# Patient Record
Sex: FEMALE | Race: WHITE | NOT HISPANIC OR LATINO | Employment: FULL TIME | URBAN - NONMETROPOLITAN AREA
[De-identification: names, ages, dates, MRNs, and addresses within clinical notes are randomized per-mention and may not be internally consistent; named-entity substitution may affect disease eponyms.]

---

## 2017-01-17 ENCOUNTER — OFFICE VISIT (OUTPATIENT)
Dept: FAMILY MEDICINE CLINIC | Facility: CLINIC | Age: 42
End: 2017-01-17

## 2017-01-17 VITALS
OXYGEN SATURATION: 99 % | WEIGHT: 223 LBS | SYSTOLIC BLOOD PRESSURE: 148 MMHG | HEIGHT: 64 IN | TEMPERATURE: 98.5 F | BODY MASS INDEX: 38.07 KG/M2 | HEART RATE: 85 BPM | DIASTOLIC BLOOD PRESSURE: 80 MMHG

## 2017-01-17 DIAGNOSIS — M79.641 PAIN IN BOTH HANDS: Primary | ICD-10-CM

## 2017-01-17 DIAGNOSIS — M79.642 PAIN IN BOTH HANDS: Primary | ICD-10-CM

## 2017-01-17 PROBLEM — R20.2 PARESTHESIA: Status: ACTIVE | Noted: 2017-01-17

## 2017-01-17 PROCEDURE — 99213 OFFICE O/P EST LOW 20 MIN: CPT | Performed by: FAMILY MEDICINE

## 2017-01-17 NOTE — MR AVS SNAPSHOT
"Suzi Heck   1/17/2017 4:30 PM   Office Visit    Dept Phone:  [phone]   Encounter #:  [id number]    Provider:  Nicholas Maldonado MD   Department:  [facility]                Your Full Care Plan              Your Updated Medication List          This list is accurate as of: 1/17/17  5:32 PM.  Always use your most recent med list.                colestipol 1 G tablet   Commonly known as:  COLESTID       dicyclomine 10 MG capsule   Commonly known as:  BENTYL       PRILOSEC 40 MG capsule   Generic drug:  omeprazole               We Performed the Following     Ambulatory Referral to Orthopedic Surgery     Folate     TSH     Vitamin B1, Whole Blood     Vitamin B12     Vitamin B6       You Were Diagnosed With        Codes Comments    Pain in both hands    -  Primary ICD-10-CM: M79.641, M79.642  ICD-9-CM: 729.5       Instructions     None    Patient Instructions History      Upcoming Appointments     Visit Type Date Time Department    OFFICE VISIT 1[facility]17/2017  4:30 PM [facility]      [employer] Signup     Our records indicate that you have declined [employer] signup. If you would like to sign up for [employer], please email [email or web address] or call [phone] to obtain an activation code.             Other Info from Your Visit           Allergies     Codeine      UNKNOWN REACTION    Demerol [Meperidine]      Vicodin [Hydrocodone-acetaminophen]        Reason for Visit     Wrist Pain           Vital Signs     Blood Pressure Pulse Temperature Height    148/80 (BP Location: Left arm, Patient Position: Sitting, Cuff Size: Adult) 85 98.5 °F (36.9 °C) (Temporal Artery ) 63.5\" (161.3 cm)    Weight Oxygen Saturation Body Mass Index Smoking Status    223 lb (101 kg) 99% 38.88 kg/m2 Current Every Day Smoker      Problems and Diagnoses Noted     Numbness and tingling    Pain in both hands    -  Primary        "

## 2017-01-18 ENCOUNTER — HOSPITAL ENCOUNTER (OUTPATIENT)
Dept: OTHER | Facility: HOSPITAL | Age: 42
Discharge: HOME | End: 2017-01-18
Attending: FAMILY MEDICINE | Admitting: FAMILY MEDICINE

## 2017-01-18 LAB
FOLATE SERPL-MCNC: 4.04 NG/ML
TSH SERPL-ACNC: 3.81 UIU/ML (ref 0.46–4.68)
VIT B12 SERPL-MCNC: 246 PG/ML (ref 239–931)

## 2017-01-18 NOTE — PROGRESS NOTES
Subjective   Suzi Heck is a 41 y.o. female.     History of Present Illness     [place], wringing a wet towel repetatively, right wrist developed a swollen painful area.  Pain base of both thumbs.  Hands go numb, difficult to get information exactly which fingers but digits 1-3.  Hands wake her up, has to shake them.    She believes she has bilateral carpal tunnel.    Review of Systems   Constitutional: Negative for chills, fatigue and fever.   HENT: Negative for congestion, ear discharge, ear pain, facial swelling, hearing loss, postnasal drip, rhinorrhea, sinus pressure, sore throat, trouble swallowing and voice change.    Eyes: Negative for discharge, redness and visual disturbance.   Respiratory: Negative for cough, chest tightness, shortness of breath and wheezing.    Cardiovascular: Negative for chest pain and palpitations.   Gastrointestinal: Negative for abdominal pain, blood in stool, constipation, diarrhea, nausea and vomiting.   Endocrine: Negative for polydipsia and polyuria.   Genitourinary: Negative for dysuria, flank pain, hematuria and urgency.   Musculoskeletal: Negative for arthralgias, back pain, joint swelling and myalgias.   Skin: Negative for rash.   Neurological: Negative for dizziness, weakness, numbness and headaches.   Hematological: Negative for adenopathy.   Psychiatric/Behavioral: Negative for confusion and sleep disturbance. The patient is not nervous/anxious.        Objective   Physical Exam   Constitutional: She is oriented to person, place, and time. She appears well-developed and well-nourished.   HENT:   Head: Normocephalic and atraumatic.   Right Ear: External ear normal.   Left Ear: External ear normal.   Nose: Nose normal.   Eyes: Conjunctivae and EOM are normal. Pupils are equal, round, and reactive to light.   Neck: Normal range of motion.   Pulmonary/Chest: Effort normal.   Musculoskeletal: Normal range of motion.   Swelling of wrist proximal 1st cmc joint.  Some firm  consistancy but radial pulse palpable through it.  Some mild tenderness base of thumb.  finklestein test negative.      Neurological: She is alert and oriented to person, place, and time.   Psychiatric: She has a normal mood and affect. Her behavior is normal. Judgment and thought content normal.   Nursing note and vitals reviewed.      Assessment/Plan   Suzi was seen today for wrist pain.    Diagnoses and all orders for this visit:    Pain in both hands  -     Ambulatory Referral to Orthopedic Surgery  -     TSH  -     Vitamin B1, Whole Blood  -     Vitamin B12  -     Vitamin B6  -     Folate      i believe tendonitis, probably carpal tunnel, and maybe a ganglion cyst over the radial artery.    labwork above  Consult orthopedic md.  i didn't think ortho would need an xray but decided later they may want one.  Will call and ask her to return for xray of both hands at her convenience.  proform splint given for right hand.

## 2017-01-19 DIAGNOSIS — M79.642 PAIN IN BOTH HANDS: Primary | ICD-10-CM

## 2017-01-19 DIAGNOSIS — M79.641 PAIN IN BOTH HANDS: Primary | ICD-10-CM

## 2017-01-20 LAB — VIT B1 BLD-SCNC: 201.4 NMOL/L (ref 66.5–200)

## 2017-01-21 LAB — VIT B6 SERPL-MCNC: 4.4 UG/L (ref 2–32.8)

## 2017-01-23 DIAGNOSIS — M79.642 PAIN IN BOTH HANDS: ICD-10-CM

## 2017-01-23 DIAGNOSIS — M79.641 PAIN IN BOTH HANDS: ICD-10-CM

## 2017-01-23 LAB — ERYTHROCYTE [SEDIMENTATION RATE] IN BLOOD: 18 MM/HR (ref 0–20)

## 2017-01-23 PROCEDURE — 86235 NUCLEAR ANTIGEN ANTIBODY: CPT | Performed by: FAMILY MEDICINE

## 2017-01-23 PROCEDURE — 86038 ANTINUCLEAR ANTIBODIES: CPT | Performed by: FAMILY MEDICINE

## 2017-01-23 PROCEDURE — 82550 ASSAY OF CK (CPK): CPT | Performed by: FAMILY MEDICINE

## 2017-01-23 PROCEDURE — 86431 RHEUMATOID FACTOR QUANT: CPT | Performed by: FAMILY MEDICINE

## 2017-01-23 PROCEDURE — 86200 CCP ANTIBODY: CPT | Performed by: FAMILY MEDICINE

## 2017-01-23 PROCEDURE — 86140 C-REACTIVE PROTEIN: CPT | Performed by: FAMILY MEDICINE

## 2017-01-23 PROCEDURE — 85651 RBC SED RATE NONAUTOMATED: CPT | Performed by: FAMILY MEDICINE

## 2017-01-23 PROCEDURE — 36415 COLL VENOUS BLD VENIPUNCTURE: CPT | Performed by: FAMILY MEDICINE

## 2017-01-23 PROCEDURE — 86225 DNA ANTIBODY NATIVE: CPT | Performed by: FAMILY MEDICINE

## 2017-01-24 ENCOUNTER — RESULTS ENCOUNTER (OUTPATIENT)
Dept: FAMILY MEDICINE CLINIC | Facility: CLINIC | Age: 42
End: 2017-01-24

## 2017-01-24 DIAGNOSIS — M79.642 PAIN IN BOTH HANDS: ICD-10-CM

## 2017-01-24 DIAGNOSIS — M79.641 PAIN IN BOTH HANDS: ICD-10-CM

## 2017-01-24 LAB
CK SERPL-CCNC: 52 U/L (ref 30–135)
CRP SERPL-MCNC: 1.7 MG/DL (ref 0–1)

## 2017-01-25 LAB
ANA SER QL: POSITIVE
CCP IGA+IGG SERPL IA-ACNC: 6 UNITS (ref 0–19)
CENTROMERE B AB SER-ACNC: <0.2 AI (ref 0–0.9)
CHROMATIN AB SERPL-ACNC: <0.2 AI (ref 0–0.9)
DSDNA AB SER-ACNC: 1 IU/ML (ref 0–9)
ENA JO1 AB SER-ACNC: <0.2 AI (ref 0–0.9)
ENA RNP AB SER-ACNC: <0.2 AI (ref 0–0.9)
ENA SCL70 AB SER-ACNC: <0.2 AI (ref 0–0.9)
ENA SM AB SER-ACNC: <0.2 AI (ref 0–0.9)
ENA SS-A AB SER-ACNC: <0.2 AI (ref 0–0.9)
ENA SS-B AB SER-ACNC: <0.2 AI (ref 0–0.9)
Lab: NORMAL
RHEUMATOID FACT SERPL-ACNC: NEGATIVE [IU]/ML

## 2017-01-26 DIAGNOSIS — R76.8 POSITIVE ANA (ANTINUCLEAR ANTIBODY): Primary | ICD-10-CM

## 2017-02-01 ENCOUNTER — OFFICE VISIT (OUTPATIENT)
Dept: ORTHOPEDIC SURGERY | Facility: CLINIC | Age: 42
End: 2017-02-01

## 2017-02-01 VITALS — BODY MASS INDEX: 38.07 KG/M2 | HEIGHT: 64 IN | WEIGHT: 223 LBS

## 2017-02-01 DIAGNOSIS — R20.0 NUMBNESS AND TINGLING IN BOTH HANDS: ICD-10-CM

## 2017-02-01 DIAGNOSIS — R20.2 NUMBNESS AND TINGLING IN BOTH HANDS: ICD-10-CM

## 2017-02-01 DIAGNOSIS — M79.641 PAIN IN BOTH HANDS: Primary | ICD-10-CM

## 2017-02-01 DIAGNOSIS — M79.642 PAIN IN BOTH HANDS: Primary | ICD-10-CM

## 2017-02-01 DIAGNOSIS — R22.31 MASS OF WRIST, RIGHT: ICD-10-CM

## 2017-02-01 PROCEDURE — 99203 OFFICE O/P NEW LOW 30 MIN: CPT | Performed by: NURSE PRACTITIONER

## 2017-02-01 NOTE — PROGRESS NOTES
Suzi Heck is a 41 y.o. female   Primary provider:  Nicholas Maldonado MD       Chief Complaint   Patient presents with   • Right Hand - Establish Care     Xray at [facility] 1/18/17   • Left Hand - Establish Care     Xray at [facility] 1/18/17       HISTORY OF PRESENT ILLNESS: The right hand is worse than the left. Pain exacerbated by typing and writing.     Hand Pain    The pain is present in the left hand and right hand. The quality of the pain is described as burning and stabbing. The pain radiates to the left arm. The pain is at a severity of 4/10. The pain is mild. The pain has been intermittent since the incident. Associated symptoms include muscle weakness, numbness and tingling. Nothing aggravates the symptoms.        CONCURRENT MEDICAL HISTORY:    Past Medical History   Diagnosis Date   • Abdominal pain      right lower quadrant      • Conjunctivitis    • Encounter for routine gynecological examination    • Gastroesophageal reflux disease    • Irritable bowel syndrome with diarrhea    • Pigmented skin lesion    • Screening examination for venereal disease    • Viral gastroenteritis        Allergies   Allergen Reactions   • Codeine      UNKNOWN REACTION   • Demerol [Meperidine]    • Vicodin [Hydrocodone-Acetaminophen]          Current Outpatient Prescriptions:   •  colestipol (COLESTID) 1 G tablet, Take  by mouth. Take 2 to 8 tablets once to two times daily for irritable bowel., Disp: , Rfl:   •  dicyclomine (BENTYL) 10 MG capsule, Take  by mouth. Take one capsule 30 minutes before meals and at bedtime., Disp: , Rfl:   •  omeprazole (PRILOSEC) 40 MG capsule, Take 40 mg by mouth Daily., Disp: , Rfl:     Past Surgical History   Procedure Laterality Date   • Cholecystectomy  2005   • Pap smear  10/15/2007     (1) - Epithelial cell abnormality: squamous cells, Atypical squamouscells of undetermined significance. cannot exclude HSIL/(ASC-H)    • Other surgical history        Foot/toes surgery procedure (1)  -  x 2   • Other  "surgical history  2007     EXAM OF CERVIX W/SCOPE 07061 (1) - mild dysplasia(JOANIE I) Squamoglandular mucosa. Chronic cervicitis. Curettings, Endocervix: unremarkable columnar cell epithelium   • Skin biopsy  2016     Biopsy of Skin 52863 (1) - LONNIE Maldonado   • Other surgical history  2016     Biopsy, Each Additional Lesion 47304 (1) - LONNIE Maldonado   •  section  2007     (1) - Primary low transverse C section   • Injection of medication  2015     Zofran (1) - TRINA Whittington   • Other surgical history  2010     Remove Impacted Cerumen 75603 (1) - DIONISIO Eaton       Family History   Problem Relation Age of Onset   • Diabetes Father    • Prostate cancer Father         Social History     Social History   • Marital status:      Spouse name: N/A   • Number of children: N/A   • Years of education: N/A     Occupational History   • Not on file.     Social History Main Topics   • Smoking status: Current Every Day Smoker     Packs/day: 0.75     Types: Cigarettes     Start date: 1990   • Smokeless tobacco: Current User      Comment: Current Smoker -  e-cig   • Alcohol use Not on file   • Drug use: Not on file   • Sexual activity: Not on file     Other Topics Concern   • Not on file     Social History Narrative        Review of Systems   Skin: Negative for rash.   Neurological: Positive for tingling, weakness and numbness.   All other systems reviewed and are negative.      PHYSICAL EXAMINATION:       Visit Vitals   • Ht 63.5\" (161.3 cm)   • Wt 223 lb (101 kg)   • BMI 38.88 kg/m2       Physical Exam   Constitutional: She is oriented to person, place, and time. Vital signs are normal. She appears well-developed and well-nourished. She is cooperative.   HENT:   Head: Normocephalic and atraumatic.   Neck: Trachea normal and phonation normal.   Pulmonary/Chest: Effort normal. No respiratory distress.   Abdominal: Soft. Normal appearance. She exhibits no distension.   Neurological: She is alert and " oriented to person, place, and time. GCS eye subscore is 4. GCS verbal subscore is 5. GCS motor subscore is 6.   Skin: Skin is warm, dry and intact.   Psychiatric: She has a normal mood and affect. Her speech is normal and behavior is normal. Judgment and thought content normal. Cognition and memory are normal.   Vitals reviewed.      GAIT:     []  Normal  []  Antalgic    Assistive device: []  None  []  Walker     []  Crutches  []  Cane     []  Wheelchair  []  Stretcher    Right Hand Exam     Tenderness   Right hand tenderness location: First CMC joint and the radial volar side of the wrist.    Range of Motion     Wrist   Extension: abnormal   Flexion: abnormal     Muscle Strength   Wrist Extension: 5/5   Wrist Flexion: 5/5   : 4/5     Tests   Tinel’s Sign (Medial Nerve): positive    Other   Erythema: absent  Scars: absent  Sensation: normal  Pulse: present    Comments:  Grind test positive      Left Hand Exam     Tenderness   Left hand tenderness location: Base of the first CMC joint.     Range of Motion     Wrist   Extension: normal   Flexion: normal     Muscle Strength   Wrist Extension: 5/5   Wrist Flexion: 5/5   :  4/5     Tests   Tinel’s Sign (Medial Nerve): positive    Other   Erythema: absent  Scars: absent  Sensation: normal  Pulse: present    Comments:  Grind test positive              Xr Hand 3+ View Bilateral    Result Date: 1/18/2017  Narrative: Patient Name-  GLORIA NEWTON Patient ID-  [id number] Ordering-  JOSÉ ANTONIO SUAZO Referring-  MD JOSÉ ANTONIO SUAZO ------------------------------------------------  DATE OF EXAM- 1/18/2017 8-41 AM CST  PROCEDURE- HAND MIN 3 VIEWS  INDICATION FOR PROCEDURE- 41 years old patient presents for evaluation of pain in right hand ICD 10 code-  Y96152.  TECHNIQUE-  Three views of the right hand are submitted for interpretation.  COMPARISON-  None.  FINDINGS-  The distal radius and ulna have a normal appearance. Carpal bones, metacarpals and phalanges have normal  appearance and alignment. Joint spaces are within normal limits. There is periarticular osteopenia. A ring is present on the ring finger.  IMPRESSION-  Periarticular osteopenia. This can be secondary to inflammatory arthropathy.  Electronically Signed By- Slime Sahni MD On: 2017-01-18 16:24:31    Xr Hand 3+ View Bilateral    Result Date: 1/18/2017  Narrative: Patient Name-  GLORIA NEWTON Patient ID-  [id number] Ordering-  JOSÉ ANTONIO SUAZO Referring-  MD JOSÉ ANTONIO SUAZO ------------------------------------------------  DATE OF EXAM- 1/18/2017 8-41 AM CST  PROCEDURE- HAND MIN 3 VIEWS  INDICATION FOR PROCEDURE- 41 years old patient presents for evaluation of pain in left hand. ICD 10 code-  S46360.  TECHNIQUE-  Three views of left hand are submitted for interpretation.  COMPARISON-  None.  FINDINGS-  Distal radius and ulna have a normal appearance. Carpal bones, metacarpals and phalanges have normal appearance and alignment. The joint spaces are within normal limits. Soft tissues are within normal limits.  Appears to be periarticular osteopenia.  IMPRESSION-  Periarticular osteopenia. This can be seen in inflammatory arthropathies.  Electronically Signed By- Slime Sahni MD On: 2017-01-18 16:20:23          ASSESSMENT:    Diagnoses and all orders for this visit:    Pain in both hands  -     EMG & Nerve Conduction Test; Future  -     MRI Wrist Right Without Contrast; Future    Numbness and tingling in both hands  -     EMG & Nerve Conduction Test; Future  -     MRI Wrist Right Without Contrast; Future    Mass of wrist, right          PLAN  Recommend EMGs to rule out carpal cubital tunnel and MRI of the right wrist for further evaluation the mass noted on the radial volar side  No Follow-up on file.    Hardik Rock, PEDRO

## 2017-03-03 ENCOUNTER — OFFICE VISIT (OUTPATIENT)
Dept: ORTHOPEDIC SURGERY | Facility: CLINIC | Age: 42
End: 2017-03-03

## 2017-03-03 VITALS — WEIGHT: 223 LBS | HEIGHT: 64 IN | BODY MASS INDEX: 38.07 KG/M2

## 2017-03-03 DIAGNOSIS — R20.0 NUMBNESS AND TINGLING IN BOTH HANDS: Primary | ICD-10-CM

## 2017-03-03 DIAGNOSIS — G56.22 ULNAR NEUROPATHY AT ELBOW, LEFT: ICD-10-CM

## 2017-03-03 DIAGNOSIS — M67.40 GANGLION CYST: ICD-10-CM

## 2017-03-03 DIAGNOSIS — R20.2 NUMBNESS AND TINGLING IN BOTH HANDS: Primary | ICD-10-CM

## 2017-03-03 DIAGNOSIS — R22.31 MASS OF WRIST, RIGHT: ICD-10-CM

## 2017-03-03 PROBLEM — M79.641 PAIN IN BOTH HANDS: Status: ACTIVE | Noted: 2017-03-03

## 2017-03-03 PROBLEM — M79.642 PAIN IN BOTH HANDS: Status: ACTIVE | Noted: 2017-03-03

## 2017-03-03 PROBLEM — G56.20 ULNAR NEUROPATHY AT ELBOW: Status: ACTIVE | Noted: 2017-03-03

## 2017-03-03 PROBLEM — R22.30 MASS OF WRIST: Status: ACTIVE | Noted: 2017-03-03

## 2017-03-03 PROCEDURE — 99213 OFFICE O/P EST LOW 20 MIN: CPT | Performed by: NURSE PRACTITIONER

## 2017-03-03 RX ORDER — MELOXICAM 7.5 MG/1
TABLET ORAL
COMMUNITY
Start: 2017-02-21 | End: 2017-04-12

## 2017-03-03 NOTE — PROGRESS NOTES
Suzi Heck is a 42 y.o. female returns for     Chief Complaint   Patient presents with   • Right Wrist - Follow-up, Numbness, Pain   • Left Wrist - Follow-up, Numbness, Pain   • Results     EMG-NCS DONE 17       HISTORY OF PRESENT ILLNESS: Patient here for EMG results.       CONCURRENT MEDICAL HISTORY:    Past Medical History   Diagnosis Date   • Abdominal pain      right lower quadrant      • Conjunctivitis    • Encounter for routine gynecological examination    • Gastroesophageal reflux disease    • Irritable bowel syndrome with diarrhea    • Pigmented skin lesion    • Screening examination for venereal disease    • Viral gastroenteritis        Allergies   Allergen Reactions   • Codeine      UNKNOWN REACTION   • Demerol [Meperidine]    • Vicodin [Hydrocodone-Acetaminophen]          Current Outpatient Prescriptions:   •  colestipol (COLESTID) 1 G tablet, Take  by mouth. Take 2 to 8 tablets once to two times daily for irritable bowel., Disp: , Rfl:   •  dicyclomine (BENTYL) 10 MG capsule, Take  by mouth. Take one capsule 30 minutes before meals and at bedtime., Disp: , Rfl:   •  meloxicam (MOBIC) 7.5 MG tablet, , Disp: , Rfl:   •  omeprazole (PRILOSEC) 40 MG capsule, Take 40 mg by mouth Daily., Disp: , Rfl:     Past Surgical History   Procedure Laterality Date   • Cholecystectomy     • Pap smear  10/15/2007     (1) - Epithelial cell abnormality: squamous cells, Atypical squamouscells of undetermined significance. cannot exclude HSIL/(ASC-H)    • Other surgical history        Foot/toes surgery procedure (1)  -  x 2   • Other surgical history  2007     EXAM OF CERVIX W/SCOPE 93222 (1) - mild dysplasia(JOANIE I) Squamoglandular mucosa. Chronic cervicitis. Curettings, Endocervix: unremarkable columnar cell epithelium   • Skin biopsy  2016     Biopsy of Skin 48965 (1) - LONNIE Maldonado   • Other surgical history  2016     Biopsy, Each Additional Lesion 92719 (1) - LONNIE Maldonado   •  section   "04/05/2007     (1) - Primary low transverse C section   • Injection of medication  06/08/2015     Zofran (1) - TRINA Whittington   • Other surgical history  12/06/2010     Remove Impacted Cerumen 36984 (1) - K. Uma       ROS  No fevers or chills.  No chest pain or shortness of air.  No GI or  disturbances.    PHYSICAL EXAMINATION:       Visit Vitals   • Ht 63.5\" (161.3 cm)   • Wt 223 lb (101 kg)   • BMI 38.88 kg/m2       Physical Exam   Constitutional: She is oriented to person, place, and time. Vital signs are normal. She appears well-developed and well-nourished. She is cooperative.   HENT:   Head: Normocephalic and atraumatic.   Neck: Trachea normal and phonation normal.   Pulmonary/Chest: Effort normal. No respiratory distress.   Abdominal: Soft. Normal appearance. She exhibits no distension.   Neurological: She is alert and oriented to person, place, and time. GCS eye subscore is 4. GCS verbal subscore is 5. GCS motor subscore is 6.   Skin: Skin is warm, dry and intact.   Psychiatric: She has a normal mood and affect. Her speech is normal and behavior is normal. Judgment and thought content normal. Cognition and memory are normal.   Vitals reviewed.      GAIT:     [x]  Normal  []  Antalgic    Assistive device: [x]  None  []  Walker     []  Crutches  []  Cane     []  Wheelchair  []  Stretcher    Right Hand Exam   Right hand exam is normal.      Left Hand Exam     Tenderness   Left hand tenderness location: Volar radial swelling/tenderness from cyst.     Range of Motion     Wrist   Extension: normal   Flexion: normal   Pronation: normal   Supination: normal     Muscle Strength   Wrist Extension: 4/5   Wrist Flexion: 4/5   :  4/5     Other   Erythema: absent  Scars: absent  Pulse: present      Left Elbow Exam     Range of Motion   Extension: normal   Flexion: normal   Pronation: normal   Supination: normal     Muscle Strength   Supination:  5/5     Tests Tinel's Sign (cubital tunnel): positive    Other   Erythema: " absent  Scars: absent  Sensation: normal  Pulse: present              Assessment: Abnormal test. EMG-NCS Test is most consistent with left upper extremity ulnar neuropathy across the elbow, prominently demyelinating type. No neurophysiologic evidence of bilateral upper extremity median neuropathy or carpal tunnel syndrome or right side ulnar neuropathy.    MRI wrist right without contrast2/13/2017  [facility]  Result Impression       1.4 x 1.4 x 1.2 cm cluster of ganglion cysts along the radial and palmar aspect of the radial styloid process.  These ganglia are near where the navicular articulates with the radius and with flexion and extension there could be some pain.   Result Narrative   Indication:  Pain and swelling in right wrist for several weeks without specific injury.    MRI, Right Wrist without Gadolinium:  No marrow edema or sign of bone injury is seen.  The articular surfaces are smooth and no joint erosions are seen.  The tendons have a normal appearance.  Along the radial and palmar aspect of the radial styloid   process, there is a cluster of ganglion cysts that measure 1.4 x 1.4 x 1.2 cm and there is mild mottling of the distal radius which suggests this is long-standing.           ASSESSMENT:    Diagnoses and all orders for this visit:    Numbness and tingling in both hands    Mass of wrist, right    Ulnar neuropathy at elbow, left    Ganglion cyst  Comments:  Right wrist    Other orders  -     meloxicam (MOBIC) 7.5 MG tablet;           PLAN  Recommend follow-up with Dr. Carrillo to discuss excision of the ganglion cyst on the volar radial wrist.-EMGs are negative for the right cubital tunnel however patient is very symptomatic at this time.  No Follow-up on file.    Hardik Rock, PEDRO

## 2017-03-20 ENCOUNTER — OFFICE VISIT (OUTPATIENT)
Dept: ORTHOPEDIC SURGERY | Facility: CLINIC | Age: 42
End: 2017-03-20

## 2017-03-20 VITALS — WEIGHT: 223 LBS | BODY MASS INDEX: 38.07 KG/M2 | HEIGHT: 64 IN

## 2017-03-20 DIAGNOSIS — M79.641 PAIN IN BOTH HANDS: ICD-10-CM

## 2017-03-20 DIAGNOSIS — M79.642 PAIN IN BOTH HANDS: ICD-10-CM

## 2017-03-20 DIAGNOSIS — M77.9 TENDONITIS: ICD-10-CM

## 2017-03-20 DIAGNOSIS — R20.0 NUMBNESS AND TINGLING IN BOTH HANDS: Primary | ICD-10-CM

## 2017-03-20 DIAGNOSIS — M67.40 GANGLION CYST: ICD-10-CM

## 2017-03-20 DIAGNOSIS — R20.2 NUMBNESS AND TINGLING IN BOTH HANDS: Primary | ICD-10-CM

## 2017-03-20 DIAGNOSIS — G56.22 ULNAR NEUROPATHY AT ELBOW, LEFT: ICD-10-CM

## 2017-03-20 DIAGNOSIS — R22.31 MASS OF WRIST, RIGHT: ICD-10-CM

## 2017-03-20 DIAGNOSIS — M18.11 PRIMARY OSTEOARTHRITIS OF FIRST CARPOMETACARPAL JOINT OF RIGHT HAND: ICD-10-CM

## 2017-03-20 PROCEDURE — 99213 OFFICE O/P EST LOW 20 MIN: CPT | Performed by: ORTHOPAEDIC SURGERY

## 2017-03-20 NOTE — PROGRESS NOTES
Suzi Heck is a 42 y.o. female returns for pain in the right wrist and carpal tunnel syndrome complaints and mass in the radial aspect of the wrist a she states that she's been already so box derby around her son and she's had more pain recently and this happen all the sudden.  X-rays have been basically negative and an MRI shows a cluster of ganglion is on the radial volar aspect of the wrist joint where she clinically has a mass     Chief Complaint   Patient presents with   • Left Elbow - Follow-up   • Left Hand - Follow-up   • Right Hand - Follow-up   • Results     EMG-NCS results       HISTORY OF PRESENT ILLNESS:       CONCURRENT MEDICAL HISTORY:    Past Medical History   Diagnosis Date   • Abdominal pain      right lower quadrant      • Conjunctivitis    • Encounter for routine gynecological examination    • Gastroesophageal reflux disease    • Irritable bowel syndrome with diarrhea    • Pigmented skin lesion    • Screening examination for venereal disease    • Viral gastroenteritis        Allergies   Allergen Reactions   • Codeine      UNKNOWN REACTION   • Demerol [Meperidine]    • Vicodin [Hydrocodone-Acetaminophen]          Current Outpatient Prescriptions:   •  colestipol (COLESTID) 1 G tablet, Take  by mouth. Take 2 to 8 tablets once to two times daily for irritable bowel., Disp: , Rfl:   •  dicyclomine (BENTYL) 10 MG capsule, Take  by mouth. Take one capsule 30 minutes before meals and at bedtime., Disp: , Rfl:   •  meloxicam (MOBIC) 7.5 MG tablet, , Disp: , Rfl:   •  omeprazole (PRILOSEC) 40 MG capsule, Take 40 mg by mouth Daily., Disp: , Rfl:     Past Surgical History   Procedure Laterality Date   • Cholecystectomy  2005   • Pap smear  10/15/2007     (1) - Epithelial cell abnormality: squamous cells, Atypical squamouscells of undetermined significance. cannot exclude HSIL/(ASC-H)    • Other surgical history        Foot/toes surgery procedure (1)  -  x 2   • Other surgical history  11/01/2007     EXAM  "OF CERVIX W/SCOPE 90261 (1) - mild dysplasia(JOANIE I) Squamoglandular mucosa. Chronic cervicitis. Curettings, Endocervix: unremarkable columnar cell epithelium   • Skin biopsy  2016     Biopsy of Skin 90688 (1) - LONNIE Joel   • Other surgical history  2016     Biopsy, Each Additional Lesion 69177 (1) - LONNIE Maldonado   •  section  2007     (1) - Primary low transverse C section   • Injection of medication  2015     Zofran (1) - TRINA Whittington   • Other surgical history  2010     Remove Impacted Cerumen 86387 (1) - KAlonzo Uma       ROS  No fevers or chills.  No chest pain or shortness of air.  No GI or  disturbances.    PHYSICAL EXAMINATION:       Visit Vitals   • Ht 63.5\" (161.3 cm)   • Wt 223 lb (101 kg)   • BMI 38.88 kg/m2       Physical Exam    GAIT:     [x]  Normal  []  Antalgic    Assistive device: [x]  None  []  Walker     []  Crutches  []  Cane     []  Wheelchair  []  Stretcher    Ortho Exam patient objective testing shows no S of carpal tunnel syndrome subjectively she complains her whole arm going numb on her right wrist she's got masses on the volar radial aspect of the wrist pain of over the basal thumb joint of the right wrist with thickening of the A1 compartment tendons abductor pollicis longus and abductor pollicis longus muscle this is come on very suddenly is causing very consistent pain EMG shows ulnar nerve neuropathy across the left elbow which is no symptoms and she does describe some pain in her neck there is a mass soft over the radial aspect of the wrist volar surface consistent with location of the ganglion by her MRI.  The grind test for the basal thumb joint is positive  EMG impression: Abnormal test. Test is most consistent with left upper extremity ulnar neuropathy across the elbow, prominently demyelinating type. No neurophysiologic evidence of bilateral upper extremity median neuropathy or carpal tunnel syndrome or right side ulnar neurpathy.          ASSESSMENT: " Addition basal thumb joint arthritis no evidence of carpal tunnel syndrome Dequvains disease  right wrist    Diagnoses and all orders for this visit:    Numbness and tingling in both hands    Mass of wrist, right    Ulnar neuropathy at elbow, left    Ganglion cyst    Pain in both hands          PLAN    No Follow-up on file.    Kolton Emery MD

## 2017-03-24 DIAGNOSIS — M18.11 PRIMARY OSTEOARTHRITIS OF FIRST CARPOMETACARPAL JOINT OF RIGHT HAND: Primary | ICD-10-CM

## 2017-03-24 DIAGNOSIS — M77.9 TENDONITIS: ICD-10-CM

## 2017-03-27 ENCOUNTER — HOSPITAL ENCOUNTER (OUTPATIENT)
Dept: GENERAL RADIOLOGY | Facility: HOSPITAL | Age: 42
Discharge: HOME | End: 2017-03-27

## 2017-04-12 ENCOUNTER — APPOINTMENT (OUTPATIENT)
Dept: PREADMISSION TESTING | Facility: HOSPITAL | Age: 42
End: 2017-04-12

## 2017-04-12 ENCOUNTER — RESULTS ENCOUNTER (OUTPATIENT)
Dept: PREADMISSION TESTING | Facility: HOSPITAL | Age: 42
End: 2017-04-12

## 2017-04-12 ENCOUNTER — PROCEDURE VISIT (OUTPATIENT)
Dept: ORTHOPEDIC SURGERY | Facility: CLINIC | Age: 42
End: 2017-04-12

## 2017-04-12 ENCOUNTER — ANESTHESIA EVENT (OUTPATIENT)
Dept: PERIOP | Facility: HOSPITAL | Age: 42
End: 2017-04-12

## 2017-04-12 VITALS
RESPIRATION RATE: 18 BRPM | HEIGHT: 64 IN | DIASTOLIC BLOOD PRESSURE: 64 MMHG | HEART RATE: 75 BPM | OXYGEN SATURATION: 100 % | WEIGHT: 222 LBS | BODY MASS INDEX: 37.9 KG/M2 | SYSTOLIC BLOOD PRESSURE: 98 MMHG

## 2017-04-12 DIAGNOSIS — M67.431 GANGLION OF WRIST, RIGHT: ICD-10-CM

## 2017-04-12 DIAGNOSIS — M65.4 TENDINITIS, DE QUERVAIN'S: ICD-10-CM

## 2017-04-12 DIAGNOSIS — M18.11 DEGENERATIVE ARTHRITIS OF THUMB, RIGHT: Primary | ICD-10-CM

## 2017-04-12 LAB — B-HCG UR QL: NEGATIVE

## 2017-04-12 RX ORDER — SODIUM CHLORIDE, SODIUM GLUCONATE, SODIUM ACETATE, POTASSIUM CHLORIDE, AND MAGNESIUM CHLORIDE 526; 502; 368; 37; 30 MG/100ML; MG/100ML; MG/100ML; MG/100ML; MG/100ML
1000 INJECTION, SOLUTION INTRAVENOUS CONTINUOUS PRN
Status: CANCELLED | OUTPATIENT
Start: 2017-04-13

## 2017-04-12 NOTE — PROGRESS NOTES
Suzi Heck is a 42 y.o. female returns for ganglion excision   No chief complaint on file.   if complaint is pain in the hand basilar thumb joint and a mass radial volar aspect patient was previously evaluated for radial volar ganglion severe pain arthritis subluxation of the basal thumb joint with severe pain.  She had to cancel original appointment for surgery and this discussion today feel that we because of severe pain in the subluxation the basal thumb joint to do an arthroplasty at the same time because just a cortisone injection is not going to salt this problem she also has severe subluxation and pain in the left thumb but is not as severe as the right since she's right hand and this is a power hand patient has had ongoing pain for some years and getting progressively worse    HISTORY OF PRESENT ILLNESS:       CONCURRENT MEDICAL HISTORY:    Past Medical History:   Diagnosis Date   • Abdominal pain     right lower quadrant      • Conjunctivitis    • Encounter for routine gynecological examination    • Gastroesophageal reflux disease    • Irritable bowel syndrome with diarrhea    • Pigmented skin lesion    • Screening examination for venereal disease    • Viral gastroenteritis        Allergies   Allergen Reactions   • Codeine      UNKNOWN REACTION   • Demerol [Meperidine]    • Vicodin [Hydrocodone-Acetaminophen]          Current Outpatient Prescriptions:   •  colestipol (COLESTID) 1 G tablet, Take  by mouth. Take 2 to 8 tablets once to two times daily for irritable bowel., Disp: , Rfl:   •  dicyclomine (BENTYL) 10 MG capsule, Take  by mouth. Take one capsule 30 minutes before meals and at bedtime., Disp: , Rfl:   •  meloxicam (MOBIC) 7.5 MG tablet, , Disp: , Rfl:   •  omeprazole (PRILOSEC) 40 MG capsule, Take 40 mg by mouth Daily., Disp: , Rfl:     Past Surgical History:   Procedure Laterality Date   •  SECTION  2007    (1) - Primary low transverse C section   • CHOLECYSTECTOMY     •  INJECTION OF MEDICATION  06/08/2015    Zofran (1) - TRINA Whittington   • OTHER SURGICAL HISTORY       Foot/toes surgery procedure (1)  -  x 2   • OTHER SURGICAL HISTORY  11/01/2007    EXAM OF CERVIX W/SCOPE 81417 (1) - mild dysplasia(JOANIE I) Squamoglandular mucosa. Chronic cervicitis. Curettings, Endocervix: unremarkable columnar cell epithelium   • OTHER SURGICAL HISTORY  06/16/2016    Biopsy, Each Additional Lesion 24434 (1) - LONNIE Maldonado   • OTHER SURGICAL HISTORY  12/06/2010    Remove Impacted Cerumen 82314 (1) - DIONISIO Eaton   • PAP SMEAR  10/15/2007    (1) - Epithelial cell abnormality: squamous cells, Atypical squamouscells of undetermined significance. cannot exclude HSIL/(ASC-H)    • SKIN BIOPSY  06/16/2016    Biopsy of Skin 22775 (1) - LONNIE Joel       ROS  No fevers or chills.  No chest pain or shortness of air.  No GI or  disturbances.    PHYSICAL EXAMINATION:       There were no vitals taken for this visit.    Physical Exam   Constitutional: She is oriented to person, place, and time. She appears well-developed and well-nourished.   HENT:   Head: Normocephalic and atraumatic.   Eyes: Conjunctivae and EOM are normal. Pupils are equal, round, and reactive to light.   Neck: Normal range of motion. Neck supple.   Cardiovascular: Normal rate, regular rhythm, normal heart sounds and intact distal pulses.    Pulmonary/Chest: Effort normal and breath sounds normal.   Abdominal: Soft. Bowel sounds are normal.   Musculoskeletal: She exhibits deformity.   Neurological: She is alert and oriented to person, place, and time. She has normal reflexes.   Skin: Skin is warm and dry.   Psychiatric: She has a normal mood and affect. Her behavior is normal. Judgment and thought content normal.       GAIT:     [x]  Normal  []  Antalgic    Assistive device: [x]  None  []  Walker     []  Crutches  []  Cane     []  Wheelchair  []  Stretcher    Right Hand Exam     Tenderness   The patient is experiencing tenderness in the dorsal area, snuff box  and vasquez area.    Range of Motion     Wrist   Extension: normal   Flexion: normal   Pronation: normal   Supination: normal     Hand   MP Thumb: abnormal   MP Index: normal   MP Middle: normal   MP Ring: normal   MP Little: normal     Other   Sensation: normal  Pulse: present    Comments:  Severe pain on reducing the basal thumb joint which is subluxed about 50% patient has a mass probably 3 cm x 2 cm by half centimeter over the radial volar aspect of the wrist joint.  As tenderness right over the A1 pulley and extensor pulley of the wrist indicating decreased veins disease      Left Hand Exam     Comments:  Subluxation basal thumb joint pain present but not as severe as the right hand other examination of the hand is normal no evidence of ganglion              No results found.          ASSESSMENT:    There are no diagnoses linked to this encounter.      PLAN    No Follow-up on file.    Kolton Emery MD

## 2017-04-12 NOTE — DISCHARGE INSTRUCTIONS
[facility]  Pre-op Information and Guidelines    You will be called after 2 p.m. the day before your surgery (Friday for Monday surgery) and notified of your time for arrival and approximate surgery time.  If you have not received a call by 4P.M., please contact Same Day Surgery at [phone] of if outside [facility] call [phone].    Please Follow these Important Safety Guidelines:    • The morning of your procedure, take only the medications listed below with   A sip of water:_____________________________________________       ______________________________________________    • DO NOT eat or drink anything after 12:00 midnight the night before surgery  Specific instructions concerning drinking clear liquids will be discussed during  the pre-surgery instruction call the day before your surgery.    • If you take a blood thinner (ex. Plavix, Coumadin, aspirin), ask your doctor when to stop it before surgery  STOP DATE: _________________    • Only 2 visitors are allowed in patient rooms at a time  Your visitors will be asked to wait in the lobby until the admission process is complete with the exception of a parent with a child and patients in need of special assistance.    • YOU CANNOT DRIVE YOURSELF HOME  You must be accompanied by someone who will be responsible for driving you home after surgery and for your care at home.    • DO NOT chew gum, use breath mints, hard candy, or smoke the day of surgery  • DO NOT drink alcohol for at least 24 hours before your surgery  • DO NOT wear any jewelry and remove all body piercing before coming to the hospital  • DO NOT wear make-up to the hospital  • If you are having surgery on an extremity (arm/leg/foot) remove nail polish/artificial nails on the surgical side  • Clothing, glasses, contacts, dentures, and hairpieces must be removed before surgery  • Bathe the night before or the morning of your surgery and do not use powders/lotions on  skin.

## 2017-04-13 ENCOUNTER — HOSPITAL ENCOUNTER (OUTPATIENT)
Facility: HOSPITAL | Age: 42
Setting detail: HOSPITAL OUTPATIENT SURGERY
Discharge: HOME | End: 2017-04-13
Attending: ORTHOPAEDIC SURGERY | Admitting: ORTHOPAEDIC SURGERY

## 2017-04-13 ENCOUNTER — ANESTHESIA (OUTPATIENT)
Dept: PERIOP | Facility: HOSPITAL | Age: 42
End: 2017-04-13

## 2017-04-13 VITALS
TEMPERATURE: 97.2 F | WEIGHT: 224.87 LBS | HEIGHT: 65 IN | OXYGEN SATURATION: 97 % | SYSTOLIC BLOOD PRESSURE: 107 MMHG | DIASTOLIC BLOOD PRESSURE: 59 MMHG | BODY MASS INDEX: 37.47 KG/M2 | RESPIRATION RATE: 18 BRPM | HEART RATE: 71 BPM

## 2017-04-13 DIAGNOSIS — M18.11 DEGENERATIVE ARTHRITIS OF THUMB, RIGHT: ICD-10-CM

## 2017-04-13 DIAGNOSIS — M67.431 GANGLION OF WRIST, RIGHT: ICD-10-CM

## 2017-04-13 DIAGNOSIS — M65.4 TENDINITIS, DE QUERVAIN'S: ICD-10-CM

## 2017-04-13 PROCEDURE — 25010000002 ROPIVACAINE PER 1 MG: Performed by: ANESTHESIOLOGY

## 2017-04-13 PROCEDURE — 25290 INCISE WRIST/FOREARM TENDON: CPT | Performed by: ORTHOPAEDIC SURGERY

## 2017-04-13 PROCEDURE — 88304 TISSUE EXAM BY PATHOLOGIST: CPT | Performed by: ORTHOPAEDIC SURGERY

## 2017-04-13 PROCEDURE — 25010000002 PROPOFOL 10 MG/ML EMULSION: Performed by: NURSE ANESTHETIST, CERTIFIED REGISTERED

## 2017-04-13 PROCEDURE — 88311 DECALCIFY TISSUE: CPT | Performed by: ORTHOPAEDIC SURGERY

## 2017-04-13 PROCEDURE — 25010000002 MIDAZOLAM PER 1 MG: Performed by: NURSE ANESTHETIST, CERTIFIED REGISTERED

## 2017-04-13 PROCEDURE — 26535 REVISE FINGER JOINT: CPT | Performed by: ORTHOPAEDIC SURGERY

## 2017-04-13 PROCEDURE — 88304 TISSUE EXAM BY PATHOLOGIST: CPT | Performed by: PATHOLOGY

## 2017-04-13 PROCEDURE — 88311 DECALCIFY TISSUE: CPT | Performed by: PATHOLOGY

## 2017-04-13 PROCEDURE — 25010000002 ONDANSETRON PER 1 MG: Performed by: NURSE ANESTHETIST, CERTIFIED REGISTERED

## 2017-04-13 PROCEDURE — 25010000002 HYDROMORPHONE PER 4 MG: Performed by: NURSE ANESTHETIST, CERTIFIED REGISTERED

## 2017-04-13 PROCEDURE — 25010000002 FENTANYL CITRATE (PF) 100 MCG/2ML SOLUTION: Performed by: NURSE ANESTHETIST, CERTIFIED REGISTERED

## 2017-04-13 RX ORDER — FLUMAZENIL 0.1 MG/ML
0.2 INJECTION INTRAVENOUS AS NEEDED
Status: DISCONTINUED | OUTPATIENT
Start: 2017-04-13 | End: 2017-04-13 | Stop reason: HOSPADM

## 2017-04-13 RX ORDER — DIPHENHYDRAMINE HYDROCHLORIDE 50 MG/ML
12.5 INJECTION INTRAMUSCULAR; INTRAVENOUS
Status: DISCONTINUED | OUTPATIENT
Start: 2017-04-13 | End: 2017-04-13 | Stop reason: HOSPADM

## 2017-04-13 RX ORDER — SCOLOPAMINE TRANSDERMAL SYSTEM 1 MG/1
1 PATCH, EXTENDED RELEASE TRANSDERMAL
Status: DISCONTINUED | OUTPATIENT
Start: 2017-04-13 | End: 2017-04-13 | Stop reason: HOSPADM

## 2017-04-13 RX ORDER — LABETALOL HYDROCHLORIDE 5 MG/ML
5 INJECTION, SOLUTION INTRAVENOUS
Status: DISCONTINUED | OUTPATIENT
Start: 2017-04-13 | End: 2017-04-13 | Stop reason: HOSPADM

## 2017-04-13 RX ORDER — ONDANSETRON 2 MG/ML
4 INJECTION INTRAMUSCULAR; INTRAVENOUS ONCE AS NEEDED
Status: COMPLETED | OUTPATIENT
Start: 2017-04-13 | End: 2017-04-13

## 2017-04-13 RX ORDER — SODIUM CHLORIDE, SODIUM GLUCONATE, SODIUM ACETATE, POTASSIUM CHLORIDE, AND MAGNESIUM CHLORIDE 526; 502; 368; 37; 30 MG/100ML; MG/100ML; MG/100ML; MG/100ML; MG/100ML
1000 INJECTION, SOLUTION INTRAVENOUS CONTINUOUS PRN
Status: DISCONTINUED | OUTPATIENT
Start: 2017-04-13 | End: 2017-04-13 | Stop reason: HOSPADM

## 2017-04-13 RX ORDER — FENTANYL CITRATE 50 UG/ML
INJECTION, SOLUTION INTRAMUSCULAR; INTRAVENOUS AS NEEDED
Status: DISCONTINUED | OUTPATIENT
Start: 2017-04-13 | End: 2017-04-13 | Stop reason: SURG

## 2017-04-13 RX ORDER — PROPOFOL 10 MG/ML
VIAL (ML) INTRAVENOUS AS NEEDED
Status: DISCONTINUED | OUTPATIENT
Start: 2017-04-13 | End: 2017-04-13 | Stop reason: SURG

## 2017-04-13 RX ORDER — ONDANSETRON 2 MG/ML
INJECTION INTRAMUSCULAR; INTRAVENOUS AS NEEDED
Status: DISCONTINUED | OUTPATIENT
Start: 2017-04-13 | End: 2017-04-13 | Stop reason: SURG

## 2017-04-13 RX ORDER — NALOXONE HCL 0.4 MG/ML
0.2 VIAL (ML) INJECTION AS NEEDED
Status: DISCONTINUED | OUTPATIENT
Start: 2017-04-13 | End: 2017-04-13 | Stop reason: HOSPADM

## 2017-04-13 RX ORDER — MIDAZOLAM HYDROCHLORIDE 1 MG/ML
INJECTION INTRAMUSCULAR; INTRAVENOUS AS NEEDED
Status: DISCONTINUED | OUTPATIENT
Start: 2017-04-13 | End: 2017-04-13 | Stop reason: SURG

## 2017-04-13 RX ORDER — ONDANSETRON 4 MG/1
4 TABLET, FILM COATED ORAL ONCE AS NEEDED
Status: DISCONTINUED | OUTPATIENT
Start: 2017-04-13 | End: 2017-04-13 | Stop reason: HOSPADM

## 2017-04-13 RX ORDER — PROMETHAZINE HYDROCHLORIDE 12.5 MG/1
12.5 TABLET ORAL ONCE AS NEEDED
Status: DISCONTINUED | OUTPATIENT
Start: 2017-04-13 | End: 2017-04-13 | Stop reason: HOSPADM

## 2017-04-13 RX ORDER — LIDOCAINE HYDROCHLORIDE 20 MG/ML
INJECTION, SOLUTION INFILTRATION; PERINEURAL AS NEEDED
Status: DISCONTINUED | OUTPATIENT
Start: 2017-04-13 | End: 2017-04-13 | Stop reason: SURG

## 2017-04-13 RX ORDER — HYDROMORPHONE HYDROCHLORIDE 4 MG/1
4 TABLET ORAL EVERY 4 HOURS PRN
Qty: 30 TABLET | Refills: 0 | Status: SHIPPED | OUTPATIENT
Start: 2017-04-13 | End: 2018-03-29

## 2017-04-13 RX ORDER — BACITRACIN 50000 [IU]/1
INJECTION, POWDER, FOR SOLUTION INTRAMUSCULAR AS NEEDED
Status: DISCONTINUED | OUTPATIENT
Start: 2017-04-13 | End: 2017-04-13 | Stop reason: HOSPADM

## 2017-04-13 RX ORDER — ONDANSETRON 4 MG/1
4 TABLET, FILM COATED ORAL EVERY 8 HOURS PRN
Qty: 30 TABLET | Refills: 0 | Status: SHIPPED | OUTPATIENT
Start: 2017-04-13 | End: 2018-03-29

## 2017-04-13 RX ORDER — ROPIVACAINE HYDROCHLORIDE 5 MG/ML
INJECTION, SOLUTION EPIDURAL; INFILTRATION; PERINEURAL AS NEEDED
Status: DISCONTINUED | OUTPATIENT
Start: 2017-04-13 | End: 2017-04-13 | Stop reason: SURG

## 2017-04-13 RX ADMIN — HYDROMORPHONE HYDROCHLORIDE 0.5 MG: 1 INJECTION, SOLUTION INTRAMUSCULAR; INTRAVENOUS; SUBCUTANEOUS at 10:06

## 2017-04-13 RX ADMIN — FENTANYL CITRATE 50 MCG: 50 INJECTION, SOLUTION INTRAMUSCULAR; INTRAVENOUS at 08:30

## 2017-04-13 RX ADMIN — HYDROMORPHONE HYDROCHLORIDE 0.5 MG: 1 INJECTION, SOLUTION INTRAMUSCULAR; INTRAVENOUS; SUBCUTANEOUS at 10:12

## 2017-04-13 RX ADMIN — ONDANSETRON 4 MG: 2 INJECTION INTRAMUSCULAR; INTRAVENOUS at 08:36

## 2017-04-13 RX ADMIN — MIDAZOLAM 2 MG: 1 INJECTION INTRAMUSCULAR; INTRAVENOUS at 08:20

## 2017-04-13 RX ADMIN — ROPIVACAINE HYDROCHLORIDE 30 ML: 5 INJECTION, SOLUTION EPIDURAL; INFILTRATION; PERINEURAL at 07:55

## 2017-04-13 RX ADMIN — SODIUM CHLORIDE, SODIUM GLUCONATE, SODIUM ACETATE, POTASSIUM CHLORIDE, AND MAGNESIUM CHLORIDE 1000 ML: 526; 502; 368; 37; 30 INJECTION, SOLUTION INTRAVENOUS at 07:23

## 2017-04-13 RX ADMIN — HYDROMORPHONE HYDROCHLORIDE 0.5 MG: 1 INJECTION, SOLUTION INTRAMUSCULAR; INTRAVENOUS; SUBCUTANEOUS at 10:01

## 2017-04-13 RX ADMIN — SCOPALAMINE 1 PATCH: 1 PATCH, EXTENDED RELEASE TRANSDERMAL at 07:24

## 2017-04-13 RX ADMIN — SCOPALAMINE 1.5 PATCH: 1 PATCH, EXTENDED RELEASE TRANSDERMAL at 08:36

## 2017-04-13 RX ADMIN — LIDOCAINE HYDROCHLORIDE 5 MG: 20 INJECTION, SOLUTION INFILTRATION; PERINEURAL at 07:55

## 2017-04-13 RX ADMIN — FENTANYL CITRATE 50 MCG: 50 INJECTION, SOLUTION INTRAMUSCULAR; INTRAVENOUS at 08:35

## 2017-04-13 RX ADMIN — LIDOCAINE HYDROCHLORIDE 100 MG: 20 INJECTION, SOLUTION INFILTRATION; PERINEURAL at 08:30

## 2017-04-13 RX ADMIN — ONDANSETRON 4 MG: 2 INJECTION INTRAMUSCULAR; INTRAVENOUS at 10:48

## 2017-04-13 RX ADMIN — PROPOFOL 100 MG: 10 INJECTION, EMULSION INTRAVENOUS at 08:30

## 2017-04-13 RX ADMIN — HYDROMORPHONE HYDROCHLORIDE 0.5 MG: 1 INJECTION, SOLUTION INTRAMUSCULAR; INTRAVENOUS; SUBCUTANEOUS at 10:17

## 2017-04-13 NOTE — ANESTHESIA PREPROCEDURE EVALUATION
Anesthesia Evaluation     Patient summary reviewed and Nursing notes reviewed   history of anesthetic complications: PONV  NPO Status: > 8 hours   Airway   Mallampati: II  TM distance: >3 FB  Neck ROM: full  possible difficult intubation  Dental - normal exam     Pulmonary - negative pulmonary ROS and normal exam    breath sounds clear to auscultation  Cardiovascular - negative cardio ROS and normal exam    Rhythm: regular  Rate: normal        Neuro/Psych  (+) numbness,    GI/Hepatic/Renal/Endo    (+)  GERD well controlled,     Musculoskeletal     Abdominal    Substance History - negative use     OB/GYN negative ob/gyn ROS         Other   (+) arthritis                                 Anesthesia Plan    ASA 2     regional   (Discussed peripheral nerve block(axillary) for post op pain relief and patient understands possible complications,risks and agrees.)  intravenous induction   Anesthetic plan and risks discussed with patient.

## 2017-04-13 NOTE — OP NOTE
Procedure(s):  THUMB CARPOMETACARPAL JOINT ARTHROPLASTY FLEXOR CARPI RADIALIS TENDON RELEASE  EXCISION OF GANGLLION AND RELEASE OF A1 PULLEY WRIST EXTENSOR DECREASED VEINS DISEASE  Procedure Note    Suzi Heck  4/13/2017    Pre-op Diagnosis:   Tendinitis, de Quervain's [M65.4]  Ganglion of wrist, right [M67.431]  Degenerative arthritis of thumb, right [M19.041]    Post-op Diagnosis:     Post-Op Diagnosis Codes:     * Tendinitis, de Quervain's [M65.4]     * Ganglion of wrist, right [M67.431]     * Degenerative arthritis of thumb, right [M19.041]    Procedure/CPT® Codes:      Procedure(s): Basilar thumb joint arthroplasty right hand excision of the greater multangular and flexor carpi radialis longus arthroplasty  Surgeon(s):  Kolton Emery MD    Anesthesia: Regional    Staff:   Circulator: Rafael Tamez RN; Elia Rodriges RN  Scrub Person: Madelyn James  Assistant: Dulce Felipe CSA    Estimated Blood Loss: * No values recorded between 4/13/2017  8:21 AM and 4/13/2017  9:53 AM *    Specimens:                  ID Type Source Tests Collected by Time Destination   A : bone right wrist Tissue Wrist, Right TISSUE EXAM Kolton Emery MD 4/13/2017 0920          Drains: None          Findings: Basilar thumb joint arthritis with subluxation a reducible no S of ganglion O evidence of decreased veins disease or tendinitis at the A1 pulley    Complications: None    Dictation: Description of procedure after satisfactory general and an endotracheal anesthesia was performed.  After this a failed axillary block.  The right arm had a tourniquet applied to the upper arm and the arm was then prepped and draped in routine fashion.  The limb was exsanguinated by elevation and Ace wrap compression and the tourniquet was inflated 250 minutes mercury attention was turned to the thumb joint was his made over the dorsal aspect of the traced radially care being taken to preserve the sensory branches radial nerve and then traced  volarly to the radial aspect of the flexor carpi radialis tendon.  There was significant synovitis of the MP metacarpal carpal joint of the right thumb and it was subluxed we opened the joint dorsally and then traced it to this collateral ligament area we could not reduce it manually so he had joint contracture the A1 tendons and the abductor pollicis longus did not show any inflammatory reaction extended the skin incision over the radial aspect of the wrist and down the volar radial aspect of the wrist on the radial aspect of the flexor carpi radialis we did not find any evidence for ganglion we dissected down to the radial volar capsule at the scapholunate radial joint area where most of these ganglions occur and there is no ganglion.  Please remove the greater multangular and then took half of the flexor carpi radialis tendon and brought it through full report from the volar side placed a drill hole in the base of the metacarpal thumb joint and then on the dorsal aspect thumb joint and passed the tendon through this and tied it down to itself with an abdomen in a reduced position stabilizing the cut the thumb in the dorsal position.  The remaining part of the tendon was used as an anchovy and was sutured into the depths of the wound to the remnant of the flexor tendon volarly as a spacer the thumb position was easily maintained and appeared to be stable capsule was then closed over this protecting the radial artery.  Tourniquet was then released hemostasis achieved with the bipolar coagulator during the procedure to dissected half of the flexor carpi radialis tendon using a tendon stripper we had to make 2 incision of the forearm to release the tendon all wounds were closed then after bleeding was controlled and irrigated with Slime back solutions.  All wounds were closed with subcuticular 4-0 Dexon mass all Steri-Strips and sterile dressing was applied over plaster splint with the thumb in abduction patient  tolerated procedure well was awakened the operating room to recovery in satisfactory condition allergen [place] reported as correct and no complications since there is no ganglion coursed no ganglion could be excised sisters notes and tenosynovitis of the A1 tendons pulley was not released    Kolton Emery MD     Date: 4/13/2017  Time: 10:03 AM

## 2017-04-13 NOTE — INTERVAL H&P NOTE
H&P reviewed. The patient was examined and there are no changes to the H&P.   Risk and benefit discussed

## 2017-04-13 NOTE — ANESTHESIA PROCEDURE NOTES
Peripheral Block    Patient location during procedure: pre-op  Reason for block: procedure for pain, at surgeon's request and post-op pain management  Preanesthetic Checklist  Completed: patient identified, site marked, surgical consent, pre-op evaluation, timeout performed, IV checked, risks and benefits discussed and monitors and equipment checked  Peripheral Block Prep:  Sterile barriers:mask, gloves and cap  Prep: ChloraPrep  Peripheral Procedure  Sedation:no  Guidance:landmark technique    Laterality:right  Block Type:axillary  Injection Technique:single-shot  Needle Type:short-bevel  Needle Gauge:21 G    Medications  Comment:Discussed peripheral nerve block(axillary) for post op pain relief and patient understands possible complications,risks and agrees.  Local Injected:ropivacaine 0.5% Local Amount Injected:30mL  Post Assessment  Injection Assessment: negative aspiration for heme and incremental injection  Patient Tolerance:comfortable throughout block  Complications:no  Additional Notes  5 ml 2% lidocaine skin infiltration.

## 2017-04-13 NOTE — DISCHARGE INSTRUCTIONS
Minor Surgery  Outpatient Instructions    General Information  You have had a minor surgical procedure and are not expected to require extensive treatment or a long recovery period.  However, the following information/instructions that are listed serve as guidelines to help you recover at home.    Activity: as tolerated per MD. Keep right arm/hand elevated above heart.     Hygiene: Sponge bath     Dressing/Wound Care: Keep dressing clean,dry and intact till seen in the office.    Diet: As tolerated.    Important Points:  -Call your doctor to report any of the following:   -unusual or excessive bleeding/drainage   -pain not reduced/controlled by medication   -elevated temperature consistently greater that 100 degrees F   -increased swelling, redness, bruising, or tenderness in surgical area  -Take medications as prescribed by your physician  -If you have any questions, please contact your doctor or the Same Day Surgery Unit at   [phone]  -If a post-operative emergency occurs, report to your nearest ER

## 2017-04-13 NOTE — H&P (VIEW-ONLY)
Suzi Heck is a 42 y.o. female returns for ganglion excision   No chief complaint on file.   if complaint is pain in the hand basilar thumb joint and a mass radial volar aspect patient was previously evaluated for radial volar ganglion severe pain arthritis subluxation of the basal thumb joint with severe pain.  She had to cancel original appointment for surgery and this discussion today feel that we because of severe pain in the subluxation the basal thumb joint to do an arthroplasty at the same time because just a cortisone injection is not going to salt this problem she also has severe subluxation and pain in the left thumb but is not as severe as the right since she's right hand and this is a power hand patient has had ongoing pain for some years and getting progressively worse    HISTORY OF PRESENT ILLNESS:       CONCURRENT MEDICAL HISTORY:    Past Medical History:   Diagnosis Date   • Abdominal pain     right lower quadrant      • Conjunctivitis    • Encounter for routine gynecological examination    • Gastroesophageal reflux disease    • Irritable bowel syndrome with diarrhea    • Pigmented skin lesion    • Screening examination for venereal disease    • Viral gastroenteritis        Allergies   Allergen Reactions   • Codeine      UNKNOWN REACTION   • Demerol [Meperidine]    • Vicodin [Hydrocodone-Acetaminophen]          Current Outpatient Prescriptions:   •  colestipol (COLESTID) 1 G tablet, Take  by mouth. Take 2 to 8 tablets once to two times daily for irritable bowel., Disp: , Rfl:   •  dicyclomine (BENTYL) 10 MG capsule, Take  by mouth. Take one capsule 30 minutes before meals and at bedtime., Disp: , Rfl:   •  meloxicam (MOBIC) 7.5 MG tablet, , Disp: , Rfl:   •  omeprazole (PRILOSEC) 40 MG capsule, Take 40 mg by mouth Daily., Disp: , Rfl:     Past Surgical History:   Procedure Laterality Date   •  SECTION  2007    (1) - Primary low transverse C section   • CHOLECYSTECTOMY     •  INJECTION OF MEDICATION  06/08/2015    Zofran (1) - TRINA Whittington   • OTHER SURGICAL HISTORY       Foot/toes surgery procedure (1)  -  x 2   • OTHER SURGICAL HISTORY  11/01/2007    EXAM OF CERVIX W/SCOPE 19811 (1) - mild dysplasia(JOANIE I) Squamoglandular mucosa. Chronic cervicitis. Curettings, Endocervix: unremarkable columnar cell epithelium   • OTHER SURGICAL HISTORY  06/16/2016    Biopsy, Each Additional Lesion [id number] (1) - LONNIE Maldonado   • OTHER SURGICAL HISTORY  12/06/2010    Remove Impacted Cerumen 26846 (1) - DIONISIO Eaton   • PAP SMEAR  10/15/2007    (1) - Epithelial cell abnormality: squamous cells, Atypical squamouscells of undetermined significance. cannot exclude HSIL/(ASC-H)    • SKIN BIOPSY  06/16/2016    Biopsy of Skin 93752 (1) - LONNIE Joel       ROS  No fevers or chills.  No chest pain or shortness of air.  No GI or  disturbances.    PHYSICAL EXAMINATION:       There were no vitals taken for this visit.    Physical Exam   Constitutional: She is oriented to person, place, and time. She appears well-developed and well-nourished.   HENT:   Head: Normocephalic and atraumatic.   Eyes: Conjunctivae and EOM are normal. Pupils are equal, round, and reactive to light.   Neck: Normal range of motion. Neck supple.   Cardiovascular: Normal rate, regular rhythm, normal heart sounds and intact distal pulses.    Pulmonary/Chest: Effort normal and breath sounds normal.   Abdominal: Soft. Bowel sounds are normal.   Musculoskeletal: She exhibits deformity.   Neurological: She is alert and oriented to person, place, and time. She has normal reflexes.   Skin: Skin is warm and dry.   Psychiatric: She has a normal mood and affect. Her behavior is normal. Judgment and thought content normal.       GAIT:     [x]  Normal  []  Antalgic    Assistive device: [x]  None  []  Walker     []  Crutches  []  Cane     []  Wheelchair  []  Stretcher    Right Hand Exam     Tenderness   The patient is experiencing tenderness in the dorsal area, snuff box  and vasquez area.    Range of Motion     Wrist   Extension: normal   Flexion: normal   Pronation: normal   Supination: normal     Hand   MP Thumb: abnormal   MP Index: normal   MP Middle: normal   MP Ring: normal   MP Little: normal     Other   Sensation: normal  Pulse: present    Comments:  Severe pain on reducing the basal thumb joint which is subluxed about 50% patient has a mass probably 3 cm x 2 cm by half centimeter over the radial volar aspect of the wrist joint.  As tenderness right over the A1 pulley and extensor pulley of the wrist indicating decreased veins disease      Left Hand Exam     Comments:  Subluxation basal thumb joint pain present but not as severe as the right hand other examination of the hand is normal no evidence of ganglion              No results found.          ASSESSMENT:    There are no diagnoses linked to this encounter.      PLAN    No Follow-up on file.    Kolton Emery MD

## 2017-04-13 NOTE — PLAN OF CARE
Problem: Patient Care Overview (Adult)  Goal: Plan of Care Review  Outcome: Ongoing (interventions implemented as appropriate)    04/13/17 1034   Coping/Psychosocial Response Interventions   Plan Of Care Reviewed With patient   Patient Care Overview   Progress improving   Outcome Evaluation   Outcome Summary/Follow up Plan patient drowsy; vitals stable; pain control improving; ready for transfer to phase II         Problem: Perioperative Period (Adult)  Goal: Signs and Symptoms of Listed Potential Problems Will be Absent or Manageable (Perioperative Period)  Outcome: Ongoing (interventions implemented as appropriate)

## 2017-04-13 NOTE — ANESTHESIA POSTPROCEDURE EVALUATION
Patient: Suzi Heck    Procedure Summary     Date Anesthesia Start Anesthesia Stop Room / Location    04/13/17 0821 0958  [facility] OR 12 / [facility]       Procedure Diagnosis Surgeon Provider    THUMB CARPOMETACARPAL JOINT ARTHROPLASTY FLEXOR CARPI RADIALIS TENDON RELEASE  EXCISION OF GANGLLION AND RELEASE OF A1 PULLEY WRIST EXTENSOR DECREASED VEINS DISEASE (Right Hand) Tendinitis, de Quervain's; Ganglion of wrist, right; Degenerative arthritis of thumb, right  (Tendinitis, de Quervain's [M65.4]; Ganglion of wrist, right [M67.431]; Degenerative arthritis of thumb, right [M19.041]) MD Deondre Dick MD          Anesthesia Type: regional  Last vitals  BP      Temp      Pulse     Resp      SpO2        Post Anesthesia Care and Evaluation    Patient location during evaluation: PACU  Patient participation: complete - patient participated  Level of consciousness: awake and alert  Pain score: 0  Pain management: adequate  Airway patency: patent  Anesthetic complications: No anesthetic complications  PONV Status: none  Cardiovascular status: acceptable  Respiratory status: acceptable  Hydration status: acceptable

## 2017-04-13 NOTE — PLAN OF CARE
Problem: Patient Care Overview (Adult)  Goal: Plan of Care Review  Outcome: Outcome(s) achieved Date Met:  04/13/17  Discharge criteria met.

## 2017-04-13 NOTE — PLAN OF CARE
Problem: Patient Care Overview (Adult)  Goal: Adult Individualization and Mutuality  Outcome: Outcome(s) achieved Date Met:  04/13/17  Goal: Discharge Needs Assessment  Outcome: Outcome(s) achieved Date Met:  04/13/17    Problem: Perioperative Period (Adult)  Goal: Signs and Symptoms of Listed Potential Problems Will be Absent or Manageable (Perioperative Period)  Outcome: Outcome(s) achieved Date Met:  04/13/17

## 2017-04-14 LAB
LAB AP CASE REPORT: NORMAL
Lab: NORMAL
PATH REPORT.FINAL DX SPEC: NORMAL
PATH REPORT.GROSS SPEC: NORMAL

## 2017-04-17 ENCOUNTER — OFFICE VISIT (OUTPATIENT)
Dept: ORTHOPEDIC SURGERY | Facility: CLINIC | Age: 42
End: 2017-04-17

## 2017-04-17 DIAGNOSIS — M18.11 PRIMARY OSTEOARTHRITIS OF FIRST CARPOMETACARPAL JOINT OF RIGHT HAND: ICD-10-CM

## 2017-04-17 DIAGNOSIS — M18.11 DEGENERATIVE ARTHRITIS OF THUMB, RIGHT: Primary | ICD-10-CM

## 2017-04-17 PROBLEM — M18.10 DEGENERATIVE ARTHRITIS OF THUMB: Status: ACTIVE | Noted: 2017-04-17

## 2017-04-17 PROCEDURE — 29075 APPL CST ELBW FNGR SHORT ARM: CPT | Performed by: ORTHOPAEDIC SURGERY

## 2017-04-17 PROCEDURE — 99024 POSTOP FOLLOW-UP VISIT: CPT | Performed by: ORTHOPAEDIC SURGERY

## 2017-04-17 NOTE — PROGRESS NOTES
"Suzi Heck is a 42 y.o. female is s/p       Chief Complaint   Patient presents with   • Right Hand - Follow-up       HISTORY OF PRESENT ILLNESS: Basilar thumb joint arthroplasty right hand excision of the greater multangular and flexor carpi radialis longus arthroplasty done on 4/13/2017       Allergies   Allergen Reactions   • Adhesive Tape Other (See Comments)     blisters   • Codeine      UNKNOWN REACTION   • Demerol [Meperidine] Mental Status Change   • Vicodin [Hydrocodone-Acetaminophen] Other (See Comments)     \"non functional\"         Current Outpatient Prescriptions:   •  colestipol (COLESTID) 1 G tablet, Take  by mouth. Take 2 to 8 tablets once to two times daily for irritable bowel., Disp: , Rfl:   •  HYDROmorphone (DILAUDID) 4 MG tablet, Take 1 tablet by mouth Every 4 (Four) Hours As Needed for Moderate Pain (4-6) or Severe Pain (7-10)., Disp: 30 tablet, Rfl: 0  •  omeprazole (PRILOSEC) 40 MG capsule, Take 40 mg by mouth Daily., Disp: , Rfl:   •  ondansetron (ZOFRAN) 4 MG tablet, Take 1 tablet by mouth Every 8 (Eight) Hours As Needed for Nausea or Vomiting., Disp: 30 tablet, Rfl: 0    No fevers or chills.  No nausea or vomiting.      PHYSICAL EXAMINATION:       Suzi Heck is a 42 y.o. female    Patient is awake and alert, answers questions appropriately and is in no apparent distress.    GAIT:     [x]  Normal  []  Antalgic    Assistive device: [x]  None  []  Walker     []  Crutches  []  Cane     []  Wheelchair  []  Stretcher    Ortho Exam wounds healing well no infection short arm cast thumb spica cast applied  No results found.        ASSESSMENT:    Diagnoses and all orders for this visit:    Degenerative arthritis of thumb, right    Primary osteoarthritis of first carpometacarpal joint of right hand          PLAN    No Follow-up on file.    Kolton Emery MD    "

## 2017-05-03 ENCOUNTER — OFFICE VISIT (OUTPATIENT)
Dept: ORTHOPEDIC SURGERY | Facility: CLINIC | Age: 42
End: 2017-05-03

## 2017-05-03 DIAGNOSIS — R20.0 NUMBNESS AND TINGLING IN BOTH HANDS: ICD-10-CM

## 2017-05-03 DIAGNOSIS — M79.642 PAIN IN BOTH HANDS: ICD-10-CM

## 2017-05-03 DIAGNOSIS — M67.431 GANGLION OF WRIST, RIGHT: ICD-10-CM

## 2017-05-03 DIAGNOSIS — R20.2 NUMBNESS AND TINGLING IN BOTH HANDS: ICD-10-CM

## 2017-05-03 DIAGNOSIS — R22.31 MASS OF WRIST, RIGHT: ICD-10-CM

## 2017-05-03 DIAGNOSIS — M18.11 PRIMARY OSTEOARTHRITIS OF FIRST CARPOMETACARPAL JOINT OF RIGHT HAND: ICD-10-CM

## 2017-05-03 DIAGNOSIS — M65.4 TENDINITIS, DE QUERVAIN'S: ICD-10-CM

## 2017-05-03 DIAGNOSIS — M79.641 PAIN IN BOTH HANDS: ICD-10-CM

## 2017-05-03 DIAGNOSIS — M18.11 DEGENERATIVE ARTHRITIS OF THUMB, RIGHT: Primary | ICD-10-CM

## 2017-05-03 PROCEDURE — 29075 APPL CST ELBW FNGR SHORT ARM: CPT | Performed by: ORTHOPAEDIC SURGERY

## 2017-05-03 PROCEDURE — 99024 POSTOP FOLLOW-UP VISIT: CPT | Performed by: ORTHOPAEDIC SURGERY

## 2017-05-31 ENCOUNTER — OFFICE VISIT (OUTPATIENT)
Dept: ORTHOPEDIC SURGERY | Facility: CLINIC | Age: 42
End: 2017-05-31

## 2017-05-31 DIAGNOSIS — M18.11 PRIMARY OSTEOARTHRITIS OF FIRST CARPOMETACARPAL JOINT OF RIGHT HAND: ICD-10-CM

## 2017-05-31 DIAGNOSIS — R20.0 NUMBNESS AND TINGLING IN BOTH HANDS: ICD-10-CM

## 2017-05-31 DIAGNOSIS — R20.2 NUMBNESS AND TINGLING IN BOTH HANDS: ICD-10-CM

## 2017-05-31 DIAGNOSIS — M18.11 DEGENERATIVE ARTHRITIS OF THUMB, RIGHT: Primary | ICD-10-CM

## 2017-05-31 PROCEDURE — 99024 POSTOP FOLLOW-UP VISIT: CPT | Performed by: ORTHOPAEDIC SURGERY

## 2017-06-22 RX ORDER — OMEPRAZOLE 40 MG/1
CAPSULE, DELAYED RELEASE ORAL
Qty: 30 CAPSULE | Refills: 11 | OUTPATIENT
Start: 2017-06-22

## 2017-06-26 RX ORDER — OMEPRAZOLE 40 MG/1
40 CAPSULE, DELAYED RELEASE ORAL DAILY
Qty: 90 CAPSULE | Refills: 1 | Status: SHIPPED | OUTPATIENT
Start: 2017-06-26 | End: 2018-03-29 | Stop reason: SDUPTHER

## 2018-03-27 RX ORDER — OMEPRAZOLE 40 MG/1
CAPSULE, DELAYED RELEASE ORAL
Qty: 30 CAPSULE | Refills: 0 | OUTPATIENT
Start: 2018-03-27

## 2018-03-29 ENCOUNTER — OFFICE VISIT (OUTPATIENT)
Dept: FAMILY MEDICINE CLINIC | Facility: CLINIC | Age: 43
End: 2018-03-29

## 2018-03-29 VITALS
HEIGHT: 65 IN | TEMPERATURE: 98.6 F | SYSTOLIC BLOOD PRESSURE: 118 MMHG | HEART RATE: 88 BPM | OXYGEN SATURATION: 97 % | DIASTOLIC BLOOD PRESSURE: 70 MMHG | WEIGHT: 240.4 LBS | BODY MASS INDEX: 40.05 KG/M2

## 2018-03-29 DIAGNOSIS — Z12.31 SCREENING MAMMOGRAM, ENCOUNTER FOR: ICD-10-CM

## 2018-03-29 DIAGNOSIS — Z00.00 GENERAL MEDICAL EXAM: Primary | ICD-10-CM

## 2018-03-29 DIAGNOSIS — R06.83 SNORING: ICD-10-CM

## 2018-03-29 DIAGNOSIS — Z12.4 CERVICAL CANCER SCREENING: ICD-10-CM

## 2018-03-29 PROCEDURE — 36415 COLL VENOUS BLD VENIPUNCTURE: CPT | Performed by: FAMILY MEDICINE

## 2018-03-29 PROCEDURE — 80061 LIPID PANEL: CPT | Performed by: FAMILY MEDICINE

## 2018-03-29 PROCEDURE — 80053 COMPREHEN METABOLIC PANEL: CPT | Performed by: FAMILY MEDICINE

## 2018-03-29 PROCEDURE — 99213 OFFICE O/P EST LOW 20 MIN: CPT | Performed by: FAMILY MEDICINE

## 2018-03-29 PROCEDURE — 85027 COMPLETE CBC AUTOMATED: CPT | Performed by: FAMILY MEDICINE

## 2018-03-29 RX ORDER — OMEPRAZOLE 40 MG/1
40 CAPSULE, DELAYED RELEASE ORAL DAILY
Qty: 90 CAPSULE | Refills: 3 | Status: SHIPPED | OUTPATIENT
Start: 2018-03-29 | End: 2018-05-07 | Stop reason: SDUPTHER

## 2018-03-29 RX ORDER — MONTELUKAST SODIUM 4 MG/1
1-2 TABLET, CHEWABLE ORAL DAILY
Qty: 180 TABLET | Refills: 3 | Status: SHIPPED | OUTPATIENT
Start: 2018-03-29 | End: 2019-08-09 | Stop reason: SDUPTHER

## 2018-03-29 NOTE — PROGRESS NOTES
Subjective   Suzi Heck is a 43 y.o. female.     History of Present Illness     Dr arambula fixed her right wrist.  Needs refill on prilosec.   Her loud snoring is becoming an embarrassment.  She says she wakes up feeling great.   Just one daily of colestid is preventing diarrhea from having no gallbladder.    Review of Systems   Constitutional: Negative for chills, fatigue and fever.   HENT: Negative for congestion, ear discharge, ear pain, facial swelling, hearing loss, postnasal drip, rhinorrhea, sinus pressure, sore throat, trouble swallowing and voice change.    Eyes: Negative for discharge, redness and visual disturbance.   Respiratory: Negative for cough, chest tightness, shortness of breath and wheezing.    Cardiovascular: Negative for chest pain and palpitations.   Gastrointestinal: Negative for abdominal pain, blood in stool, constipation, diarrhea, nausea and vomiting.   Endocrine: Negative for polydipsia and polyuria.   Genitourinary: Negative for dysuria, flank pain, hematuria and urgency.   Musculoskeletal: Negative for arthralgias, back pain, joint swelling and myalgias.   Skin: Negative for rash.   Neurological: Negative for dizziness, weakness, numbness and headaches.   Hematological: Negative for adenopathy.   Psychiatric/Behavioral: Negative for confusion and sleep disturbance. The patient is not nervous/anxious.        Objective   Physical Exam   Constitutional: She is oriented to person, place, and time. She appears well-developed and well-nourished.   HENT:   Head: Normocephalic and atraumatic.   Right Ear: External ear normal.   Left Ear: External ear normal.   Nose: Nose normal.   Mouth/Throat: Oropharynx is clear and moist.   Eyes: Conjunctivae and EOM are normal. Pupils are equal, round, and reactive to light.   Neck: Normal range of motion. Neck supple.   Cardiovascular: Normal rate, regular rhythm and normal heart sounds.  Exam reveals no gallop and no friction rub.    No murmur  heard.  Pulmonary/Chest: Effort normal and breath sounds normal.   Abdominal: Soft. Bowel sounds are normal. She exhibits no distension. There is no tenderness. There is no rebound and no guarding.   Musculoskeletal: Normal range of motion. She exhibits no edema or deformity.   Neurological: She is alert and oriented to person, place, and time. No cranial nerve deficit.   Skin: Skin is warm and dry. No rash noted. No erythema.   Psychiatric: She has a normal mood and affect. Her behavior is normal. Judgment and thought content normal.   Nursing note and vitals reviewed.      Assessment/Plan   Suzi was seen today for med refill.    Diagnoses and all orders for this visit:    General medical exam  -     CBC (No Diff)  -     Comprehensive Metabolic Panel  -     Lipid Panel    Cervical cancer screening  -     Ambulatory Referral to Gynecology    Screening mammogram, encounter for  -     Mammo Screening Digital Tomosynthesis Bilateral With CAD    Snoring  -     Ambulatory Referral to Sleep Medicine    Other orders  -     omeprazole (PRILOSEC) 40 MG capsule; Take 1 capsule by mouth Daily.  -     colestipol (COLESTID) 1 g tablet; Take 1-2 tablets by mouth Daily. Take 2 to 8 tablets once to two times daily for irritable bowel.

## 2018-03-30 LAB
ALBUMIN SERPL-MCNC: 3.5 G/DL (ref 3.4–4.8)
ALBUMIN/GLOB SERPL: 1.2 G/DL (ref 1.1–1.8)
ALP SERPL-CCNC: 86 U/L (ref 38–126)
ALT SERPL W P-5'-P-CCNC: 50 U/L (ref 9–52)
ANION GAP SERPL CALCULATED.3IONS-SCNC: 13 MMOL/L (ref 5–15)
ARTICHOKE IGE QN: 71 MG/DL (ref 1–129)
AST SERPL-CCNC: 29 U/L (ref 14–36)
BILIRUB SERPL-MCNC: <0.1 MG/DL (ref 0.2–1.3)
BUN BLD-MCNC: 10 MG/DL (ref 7–21)
BUN/CREAT SERPL: 12.3 (ref 7–25)
CALCIUM SPEC-SCNC: 8.8 MG/DL (ref 8.4–10.2)
CHLORIDE SERPL-SCNC: 106 MMOL/L (ref 95–110)
CHOLEST SERPL-MCNC: 138 MG/DL (ref 0–199)
CO2 SERPL-SCNC: 23 MMOL/L (ref 22–31)
CREAT BLD-MCNC: 0.81 MG/DL (ref 0.5–1)
DEPRECATED RDW RBC AUTO: 44.2 FL (ref 36.4–46.3)
ERYTHROCYTE [DISTWIDTH] IN BLOOD BY AUTOMATED COUNT: 15.1 % (ref 11.5–14.5)
GFR SERPL CREATININE-BSD FRML MDRD: 78 ML/MIN/1.73 (ref 58–135)
GLOBULIN UR ELPH-MCNC: 3 GM/DL (ref 2.3–3.5)
GLUCOSE BLD-MCNC: 100 MG/DL (ref 60–100)
HCT VFR BLD AUTO: 38.5 % (ref 35–45)
HDLC SERPL-MCNC: 35 MG/DL (ref 60–200)
HGB BLD-MCNC: 12.5 G/DL (ref 12–15.5)
LDLC/HDLC SERPL: 0.96 {RATIO} (ref 0–3.22)
MCH RBC QN AUTO: 26 PG (ref 26.5–34)
MCHC RBC AUTO-ENTMCNC: 32.5 G/DL (ref 31.4–36)
MCV RBC AUTO: 80.2 FL (ref 80–98)
PLATELET # BLD AUTO: 388 10*3/MM3 (ref 150–450)
PMV BLD AUTO: 10.1 FL (ref 8–12)
POTASSIUM BLD-SCNC: 4.6 MMOL/L (ref 3.5–5.1)
PROT SERPL-MCNC: 6.5 G/DL (ref 6.3–8.6)
RBC # BLD AUTO: 4.8 10*6/MM3 (ref 3.77–5.16)
SODIUM BLD-SCNC: 142 MMOL/L (ref 137–145)
TRIGL SERPL-MCNC: 347 MG/DL (ref 20–199)
WBC NRBC COR # BLD: 12.87 10*3/MM3 (ref 3.2–9.8)

## 2018-05-07 RX ORDER — OMEPRAZOLE 40 MG/1
40 CAPSULE, DELAYED RELEASE ORAL DAILY
Qty: 90 CAPSULE | Refills: 3 | Status: SHIPPED | OUTPATIENT
Start: 2018-05-07 | End: 2019-04-17 | Stop reason: SDUPTHER

## 2018-05-31 ENCOUNTER — OFFICE VISIT (OUTPATIENT)
Dept: OBSTETRICS AND GYNECOLOGY | Facility: CLINIC | Age: 43
End: 2018-05-31

## 2018-05-31 VITALS
DIASTOLIC BLOOD PRESSURE: 79 MMHG | SYSTOLIC BLOOD PRESSURE: 122 MMHG | HEIGHT: 65 IN | HEART RATE: 100 BPM | BODY MASS INDEX: 39.15 KG/M2 | WEIGHT: 235 LBS

## 2018-05-31 DIAGNOSIS — Z12.31 ENCOUNTER FOR SCREENING MAMMOGRAM FOR BREAST CANCER: ICD-10-CM

## 2018-05-31 DIAGNOSIS — Z01.419 ENCOUNTER FOR GYNECOLOGICAL EXAMINATION WITHOUT ABNORMAL FINDING: Primary | ICD-10-CM

## 2018-05-31 PROCEDURE — 87624 HPV HI-RISK TYP POOLED RSLT: CPT | Performed by: NURSE PRACTITIONER

## 2018-05-31 PROCEDURE — G0123 SCREEN CERV/VAG THIN LAYER: HCPCS | Performed by: NURSE PRACTITIONER

## 2018-05-31 PROCEDURE — 99386 PREV VISIT NEW AGE 40-64: CPT | Performed by: NURSE PRACTITIONER

## 2018-05-31 NOTE — PROGRESS NOTES
Subjective   Suzi Heck is a 43 y.o.  here for GYN exam and mammogram. No complaints or concerns.     LMP- 19; no problems  Last pap- 3/16/12; no known hx of abnormal  Last mammo- never      Gynecologic Exam   The patient's pertinent negatives include no genital itching, genital lesions, genital odor, genital rash, missed menses, pelvic pain, vaginal bleeding or vaginal discharge. Pertinent negatives include no abdominal pain, constipation, diarrhea, dysuria, headaches, nausea, rash, urgency or vomiting. She is sexually active. No, her partner does not have an STD. She uses vasectomy for contraception. Her menstrual history has been regular. Her past medical history is significant for a  section.       The following portions of the patient's history were reviewed and updated as appropriate: allergies, current medications, past family history, past medical history, past social history, past surgical history and problem list.    Review of Systems   Constitutional: Negative for activity change, appetite change, fatigue and unexpected weight change.   HENT: Negative for congestion and trouble swallowing.    Respiratory: Negative for chest tightness and shortness of breath.    Cardiovascular: Negative for chest pain, palpitations and leg swelling.   Gastrointestinal: Negative for abdominal distention, abdominal pain, blood in stool, constipation, diarrhea, nausea and vomiting.   Endocrine: Negative for cold intolerance, heat intolerance, polydipsia, polyphagia and polyuria.   Genitourinary: Negative for difficulty urinating, dyspareunia, dysuria, genital sores, menstrual problem, missed menses, pelvic pain, urgency, vaginal bleeding, vaginal discharge and vaginal pain.   Musculoskeletal: Negative for gait problem and myalgias.   Skin: Negative for color change, pallor and rash.   Neurological: Negative for dizziness, weakness, light-headedness and headaches.   Hematological: Negative for adenopathy.    Psychiatric/Behavioral: Negative for agitation, confusion, dysphoric mood, self-injury and suicidal ideas. The patient is not nervous/anxious.        Objective   Physical Exam   Constitutional: She is oriented to person, place, and time. She appears well-developed and well-nourished.   Neck: No thyromegaly present.   Cardiovascular: Normal rate, regular rhythm, normal heart sounds and intact distal pulses.    Pulmonary/Chest: Effort normal and breath sounds normal. Right breast exhibits no inverted nipple, no mass, no nipple discharge, no skin change and no tenderness. Left breast exhibits no inverted nipple, no mass, no nipple discharge, no skin change and no tenderness. Breasts are symmetrical.   Abdominal: Soft. Bowel sounds are normal. She exhibits no distension. There is no tenderness.   Genitourinary: Vagina normal and uterus normal. No breast discharge or bleeding. There is no rash, tenderness, lesion or injury on the right labia. There is no rash, tenderness, lesion or injury on the left labia. Cervix exhibits no motion tenderness, no discharge and no friability. Right adnexum displays no mass, no tenderness and no fullness. Left adnexum displays no mass, no tenderness and no fullness.   Genitourinary Comments: Pap smear obtained.   Lymphadenopathy:     She has no axillary adenopathy.        Right: No inguinal adenopathy present.        Left: No inguinal adenopathy present.   Neurological: She is alert and oriented to person, place, and time.   Skin: Skin is warm, dry and intact.   Psychiatric: She has a normal mood and affect. Her speech is normal and behavior is normal.   Nursing note and vitals reviewed.        Assessment/Plan   Suzi was seen today for gynecologic exam.    Diagnoses and all orders for this visit:    Encounter for gynecological examination without abnormal finding  -     Liquid-based Pap Smear, Screening    Encounter for screening mammogram for breast cancer

## 2018-06-04 LAB
LAB AP CASE REPORT: NORMAL
LAB AP GYN ADDITIONAL INFORMATION: NORMAL
LAB AP GYN OTHER FINDINGS: NORMAL
Lab: NORMAL
PATH INTERP SPEC-IMP: NORMAL
STAT OF ADQ CVX/VAG CYTO-IMP: NORMAL

## 2018-06-05 LAB — HPV I/H RISK 4 DNA CVX QL PROBE+SIG AMP: NEGATIVE

## 2018-07-10 ENCOUNTER — CONSULT (OUTPATIENT)
Dept: SLEEP MEDICINE | Facility: HOSPITAL | Age: 43
End: 2018-07-10

## 2018-07-10 VITALS
OXYGEN SATURATION: 98 % | BODY MASS INDEX: 38.65 KG/M2 | DIASTOLIC BLOOD PRESSURE: 88 MMHG | HEART RATE: 98 BPM | WEIGHT: 232 LBS | SYSTOLIC BLOOD PRESSURE: 122 MMHG | HEIGHT: 65 IN

## 2018-07-10 DIAGNOSIS — G47.33 OBSTRUCTIVE SLEEP APNEA, ADULT: Primary | ICD-10-CM

## 2018-07-10 PROCEDURE — 99204 OFFICE O/P NEW MOD 45 MIN: CPT | Performed by: INTERNAL MEDICINE

## 2018-07-10 NOTE — PROGRESS NOTES
New Patient Sleep Medicine Consultation    Encounter Date: 7/10/2018         Patient's PCP: Nicholas Maldonado MD  Referring provider: Nicholas Maldonado MD  Reason for consultation: snoring    Suzi Heck is a 43 y.o. female who admits to snoring, Up to the bathroom at night, teeth grinding and sleepwalking or sleeptalking. She denies cataplexy, sleep paralysis, or hypnagogic hallucinations. Her bedtime is ~ 2000. She  falls asleep after 20 minutes, and is up 1-2 times per night. She wakes up ~ 05:04. She endorses 8-9 hours of sleep. She drinks 0 cups of coffee, 0 teas, and 0 sodas per day. She drinks 0 alcoholic beverages per week. She is a current smoker. She takes Nyquil to help with breathing at night and sleep onset. She has some sleepiness with driving. She gets sleepy after lunch.    [place] - 5    Past Medical History:   Diagnosis Date   • Abdominal pain     right lower quadrant      • Allergy     seasonal   • Conjunctivitis    • Encounter for routine gynecological examination    • Gastroesophageal reflux disease    • Irritable bowel syndrome with diarrhea    • Pigmented skin lesion    • PONV (postoperative nausea and vomiting)    • Screening examination for venereal disease    • Viral gastroenteritis      Social History     Social History   • Marital status:      Spouse name: N/A   • Number of children: N/A   • Years of education: N/A     Occupational History   • Not on file.     Social History Main Topics   • Smoking status: Current Every Day Smoker     Packs/day: 0.75     Years: 25.00     Types: Cigarettes     Start date: 1/17/1990   • Smokeless tobacco: Never Used      Comment: Current Smoker -  e-cig   • Alcohol use No   • Drug use: No   • Sexual activity: Yes     Partners: Male     Birth control/ protection: Surgical      Comment: Vasectomy     Other Topics Concern   • Not on file     Social History Narrative   • No narrative on file     Family History   Problem Relation Age of Onset   • Diabetes  "Father    • Prostate cancer Father    • Colon cancer Father    , , 1 child  3 brothers and 2 sisters  Other family history + for: snoring  Smoking history: smoked 1 ppds from age 16 until present  FH of sleep disorders: sister with [place]    Review of Systems:  Constitutional: negative  Eyes: negative  Ears, nose, mouth, throat, and face: negative  Respiratory: negative  Cardiovascular: negative  Gastrointestinal: negative  Genitourinary:negative  Integument/breast: negative  Hematologic/lymphatic: negative  Musculoskeletal:negative  Neurological: negative  Behavioral/Psych: negative  Endocrine: negative  Allergic/Immunologic: negative Patient advised to discuss any positive ROS with PCP.      Vitals:    07/10/18 1417   BP: 122/88   Pulse: 98   SpO2: 98%     Body mass index is 38.61 kg/m². Patient's Body mass index is 38.61 kg/m². BMI is above normal parameters. Recommendations include: referral to primary care.  Neck circumference: 14.5\"            General: Alert. Cooperative. Well developed. No acute distress.             Head:  Normocephalic. Symmetrical. Atraumatic.              Eyes: Sclera clear. No icterus. PERRLA. Normal EOM.             Ears: No deformities. Normal hearing.             Nose: No septal deviation. No drainage.          Throat: No oral lesions. No thrush. Moist mucous membranes.    Tongue is enlarged    Dentition is fair, narrow       Pharynx: Posterior pharyngeal pillars are narrow    Mallampati score of IV (only hard palate visible)    Pharynx is normal with unrermarkable tonsils   Chest Wall:  Normal shape. Symmetric expansion with respiration. No tenderness.             Neck:  Trachea midline.           Lungs:  Clear to auscultation bilaterally. No wheezes. No rhonchi. No rales. Respirations regular, even and unlabored.            Heart:  Regular rhythm and normal rate. Normal S1 and S2. No murmur.     Abdomen:  Soft, non-tender and non-distended. Normal bowel sounds. No " masses.  Extremities:  Moves all extremities well. No edema.           Pulses: Pulses palpable and equal bilaterally.               Skin: Dry. Intact. No bleeding. No rash.           Neuro: Moves all 4 extremities and cranial nerves grossly intact.  Psychiatric: Normal mood and affect.      Current Outpatient Prescriptions:   •  colestipol (COLESTID) 1 g tablet, Take 1-2 tablets by mouth Daily. Take 2 to 8 tablets once to two times daily for irritable bowel., Disp: 180 tablet, Rfl: 3  •  omeprazole (PRILOSEC) 40 MG capsule, Take 1 capsule by mouth Daily., Disp: 90 capsule, Rfl: 3    Contraindications to home sleep test: none    ASSESSMENT:  1. Obstructive sleep apnea  1. Check home sleep study  2. If (+) consider oral appliance    RTC in 3 months         This document has been electronically signed by Kirk Tompkins MD on July 10, 2018         CC: MD Joel Arredondo Michael L, MD

## 2018-08-06 ENCOUNTER — APPOINTMENT (OUTPATIENT)
Dept: SLEEP MEDICINE | Facility: HOSPITAL | Age: 43
End: 2018-08-06
Attending: INTERNAL MEDICINE

## 2018-08-27 ENCOUNTER — APPOINTMENT (OUTPATIENT)
Dept: SLEEP MEDICINE | Facility: HOSPITAL | Age: 43
End: 2018-08-27
Attending: INTERNAL MEDICINE

## 2019-04-17 RX ORDER — OMEPRAZOLE 40 MG/1
CAPSULE, DELAYED RELEASE ORAL
Qty: 90 CAPSULE | Refills: 0 | Status: SHIPPED | OUTPATIENT
Start: 2019-04-17 | End: 2019-09-05 | Stop reason: SDUPTHER

## 2019-08-09 ENCOUNTER — OFFICE VISIT (OUTPATIENT)
Dept: FAMILY MEDICINE CLINIC | Facility: CLINIC | Age: 44
End: 2019-08-09

## 2019-08-09 VITALS
SYSTOLIC BLOOD PRESSURE: 116 MMHG | OXYGEN SATURATION: 99 % | DIASTOLIC BLOOD PRESSURE: 80 MMHG | HEIGHT: 64 IN | HEART RATE: 66 BPM | WEIGHT: 214.4 LBS | BODY MASS INDEX: 36.6 KG/M2 | TEMPERATURE: 98.7 F

## 2019-08-09 DIAGNOSIS — R11.0 NAUSEA: ICD-10-CM

## 2019-08-09 DIAGNOSIS — R10.31 RIGHT LOWER QUADRANT ABDOMINAL PAIN: ICD-10-CM

## 2019-08-09 DIAGNOSIS — K59.00 CONSTIPATION, UNSPECIFIED CONSTIPATION TYPE: ICD-10-CM

## 2019-08-09 DIAGNOSIS — K58.2 IRRITABLE BOWEL SYNDROME WITH BOTH CONSTIPATION AND DIARRHEA: Primary | ICD-10-CM

## 2019-08-09 DIAGNOSIS — R19.7 DIARRHEA, UNSPECIFIED TYPE: ICD-10-CM

## 2019-08-09 PROCEDURE — 99213 OFFICE O/P EST LOW 20 MIN: CPT | Performed by: NURSE PRACTITIONER

## 2019-08-09 RX ORDER — MONTELUKAST SODIUM 4 MG/1
1-2 TABLET, CHEWABLE ORAL DAILY
Qty: 180 TABLET | Refills: 3 | Status: SHIPPED | OUTPATIENT
Start: 2019-08-09 | End: 2019-08-09

## 2019-08-09 RX ORDER — DICYCLOMINE HYDROCHLORIDE 10 MG/1
10 CAPSULE ORAL
Qty: 100 CAPSULE | Refills: 0 | Status: SHIPPED | OUTPATIENT
Start: 2019-08-09 | End: 2019-08-09

## 2019-08-09 RX ORDER — DICYCLOMINE HYDROCHLORIDE 10 MG/1
10 CAPSULE ORAL
Qty: 100 CAPSULE | Refills: 0 | Status: SHIPPED | OUTPATIENT
Start: 2019-08-09 | End: 2019-09-12

## 2019-08-09 NOTE — PATIENT INSTRUCTIONS
Irritable Bowel Syndrome, Adult    Irritable bowel syndrome (IBS) is a group of symptoms that affects the organs responsible for digestion (gastrointestinal or GI tract). IBS is not one specific disease.  To regulate how the GI tract works, the body sends signals back and forth between the intestines and the brain. If you have IBS, there may be a problem with these signals. As a result, the GI tract does not function normally. The intestines may become more sensitive and overreact to certain things. This may be especially true when you eat certain foods or when you are under stress.  There are four types of IBS. These may be determined based on the consistency of your stool (feces):  · IBS with diarrhea.  · IBS with constipation.  · Mixed IBS.  · Unsubtyped IBS.  It is important to know which type of IBS you have. Certain treatments are more likely to be helpful for certain types of IBS.  What are the causes?  The exact cause of IBS is not known.  What increases the risk?  You may have a higher risk for IBS if you:  · Are female.  · Are younger than 40.  · Have a family history of IBS.  · Have a mental health condition, such as depression, anxiety, or post-traumatic stress disorder.  · Have had a bacterial infection of your GI tract.  What are the signs or symptoms?  Symptoms of IBS vary from person to person. The main symptom is abdominal pain or discomfort. Other symptoms usually include one or more of the following:  · Diarrhea, constipation, or both.  · Abdominal swelling or bloating.  · Feeling full after eating a small or regular-sized meal.  · Frequent gas.  · Mucus in the stool.  · A feeling of having more stool left after a bowel movement.  Symptoms tend to come and go. They may be triggered by stress, mental health conditions, or certain foods.  How is this diagnosed?  This condition may be diagnosed based on a physical exam, your medical history, and your symptoms. You may have tests, such as:  · Blood  tests.  · Stool test.  · X-rays.  · CT scan.  · Colonoscopy. This is a procedure in which your GI tract is viewed with a long, thin, flexible tube.  How is this treated?  There is no cure for IBS, but treatment can help relieve symptoms. Treatment depends on the type of IBS you have, and may include:  · Changes to your diet, such as:  ? Avoiding foods that cause symptoms.  ? Drinking more water.  ? Following a low-FODMAP (fermentable oligosaccharides, disaccharides, monosaccharides, and polyols) diet for up to 6 weeks, or as told by your health care provider. FODMAPs are sugars that are hard for some people to digest.  ? Eating more fiber.  ? Eating medium-sized meals at the same times every day.  · Medicines. These may include:  ? Fiber supplements, if you have constipation.  ? Medicine to control diarrhea (antidiarrheal medicines).  ? Medicine to help control muscle tightening (spasms) in your GI tract (antispasmodic medicines).  ? Medicines to help with mental health conditions, such as antidepressants or tranquilizers.  · Talk therapy or counseling.  · Working with a diet and nutrition specialist (dietitian) to help create a food plan that is right for you.  · Managing your stress.  Follow these instructions at home:  Eating and drinking  · Eat a healthy diet.  · Eat medium-sized meals at about the same time every day. Do not eat large meals.  · Gradually eat more fiber-rich foods. These include whole grains, fruits, and vegetables. This may be especially helpful if you have IBS with constipation.  · Eat a diet low in FODMAPs.  · Drink enough fluid to keep your urine pale yellow.  · Keep a journal of foods that seem to trigger symptoms.  · Avoid foods and drinks that:  ? Contain added sugar.  ? Make your symptoms worse. Dairy products, caffeinated drinks, and carbonated drinks can make symptoms worse for some people.  General instructions  · Take over-the-counter and prescription medicines and supplements only  as told by your health care provider.  · Get enough exercise. Do at least 150 minutes of moderate-intensity exercise each week.  · Manage your stress. Getting enough sleep and exercise can help you manage stress.  · Keep all follow-up visits as told by your health care provider and therapist. This is important.  Alcohol Use  · Do not drink alcohol if:  ? Your health care provider tells you not to drink.  ? You are pregnant, may be pregnant, or are planning to become pregnant.  · If you drink alcohol, limit how much you have:  ? 0-1 drink a day for women.  ? 0-2 drinks a day for men.  · Be aware of how much alcohol is in your drink. In the [place] one drink equals one typical bottle of beer (12 oz), one-half glass of wine (5 oz), or one shot of hard liquor (1½ oz).  Contact a health care provider if you have:  · Constant pain.  · Weight loss.  · Difficulty or pain when swallowing.  · Diarrhea that gets worse.  Get help right away if you have:  · Severe abdominal pain.  · Fever.  · Diarrhea with symptoms of dehydration, such as dizziness or dry mouth.  · Bright red blood in your stool.  · Stool that is black and tarry.  · Abdominal swelling.  · Vomiting that does not stop.  · Blood in your vomit.  Summary  · Irritable bowel syndrome (IBS) is not one specific disease. It is a group of symptoms that affects digestion.  · Your intestines may become more sensitive and overreact to certain things. This may be especially true when you eat certain foods or when you are under stress.  · There is no cure for IBS, but treatment can help relieve symptoms.  This information is not intended to replace advice given to you by your health care provider. Make sure you discuss any questions you have with your health care provider.  Document Released: 12/18/2006 Document Revised: 12/11/2018 Document Reviewed: 12/11/2018  [employer] Interactive Patient Education © 2019 [employer]

## 2019-08-09 NOTE — PROGRESS NOTES
Subjective   Suzi Heck is a 44 y.o. female.     History of Present Illness   Suzi Heck is a 44 y.o. female who presents for evaluation of abdominal pain. Onset was today. Symptroms have been gradually worsening. The pain is described as burning, shooting and stabbing, and is 8/10 in intensity. Pain is located in the RLQ and epigastric region.  Aggravating factors: none.  Alleviating factors: none. Associated symptoms: constipation, diarrhea and nausea. The patient denies arthralagias, chills, dysuria, fever, melena, myalgias, sweats and vomiting. Pt says she has had trouble with diarrhea since she had her gallbladder removed. Has never had a colonoscopy, says she was sent for colonoscopy before and couldn't afford it. No family history of colon cancer. Reports having right lower quadrant pain in the past that was worse during her menstrual cycle and saw OB/GYN, they did ultrasound and it was normal.    The following portions of the patient's history were reviewed and updated as appropriate: allergies, current medications, past family history, past medical history, past social history, past surgical history and problem list.    Review of Systems   Constitutional: Negative for chills, fatigue and fever.   HENT: Negative for congestion, ear pain, rhinorrhea and sore throat.    Eyes: Negative for blurred vision, double vision and visual disturbance.   Respiratory: Negative for cough, chest tightness, shortness of breath and wheezing.    Cardiovascular: Negative for chest pain, palpitations and leg swelling.   Gastrointestinal: Positive for abdominal pain, constipation, diarrhea and nausea. Negative for abdominal distention, anal bleeding, blood in stool, vomiting, GERD and indigestion.   Endocrine: Negative for cold intolerance and heat intolerance.   Genitourinary: Negative for difficulty urinating, dysuria, frequency and hematuria.   Musculoskeletal: Negative for arthralgias, back pain, neck pain and neck  stiffness.   Skin: Negative for dry skin, pallor, rash, skin lesions and bruise.   Allergic/Immunologic: Negative for environmental allergies, food allergies and immunocompromised state.   Neurological: Negative for dizziness, syncope, weakness, light-headedness, headache and confusion.   Hematological: Negative for adenopathy. Does not bruise/bleed easily.   Psychiatric/Behavioral: Negative for self-injury, sleep disturbance, suicidal ideas, depressed mood and stress. The patient is not nervous/anxious.        Objective   Physical Exam   Constitutional: She is oriented to person, place, and time. She appears well-developed and well-nourished. She is cooperative. She does not have a sickly appearance. She does not appear ill. No distress.   HENT:   Head: Normocephalic.   Eyes: Conjunctivae, EOM and lids are normal. Pupils are equal, round, and reactive to light.   Neck: Trachea normal, normal range of motion, full passive range of motion without pain and phonation normal. Neck supple. No thyroid mass and no thyromegaly present.   Cardiovascular: Normal rate, regular rhythm, S1 normal, S2 normal and normal heart sounds.   No murmur heard.  Pulmonary/Chest: Effort normal and breath sounds normal. No respiratory distress. She has no wheezes. She has no rhonchi. She has no rales.   Abdominal: Soft. Normal appearance. She exhibits no shifting dullness, no distension, no pulsatile liver, no fluid wave, no abdominal bruit, no ascites, no pulsatile midline mass and no mass. Bowel sounds are increased. There is no hepatosplenomegaly. There is tenderness in the right lower quadrant and epigastric area. There is no rigidity, no rebound, no guarding, no CVA tenderness, no tenderness at McBurney's point and negative Flores's sign.   Neurological: She is alert and oriented to person, place, and time. She has normal strength. No cranial nerve deficit. Coordination and gait normal.   Skin: Skin is warm, dry and intact. She is not  diaphoretic. No pallor.   Psychiatric: She has a normal mood and affect. Her speech is normal and behavior is normal. Judgment and thought content normal. Cognition and memory are normal.     Vitals:    08/09/19 1024   BP: 116/80   Pulse: 66   Temp: 98.7 °F (37.1 °C)   SpO2: 99%         Assessment/Plan   Suzi was seen today for nausea.    Diagnoses and all orders for this visit:    Irritable bowel syndrome with both constipation and diarrhea  -     Discontinue: dicyclomine (BENTYL) 10 MG capsule; Take 1 capsule by mouth 4 (Four) Times a Day Before Meals & at Bedtime.  -     Discontinue: colestipol (COLESTID) 1 g tablet; Take 1-2 tablets by mouth Daily. Take 2 to 8 tablets once to two times daily for irritable bowel.  -     Ambulatory Referral to Gastroenterology  -     dicyclomine (BENTYL) 10 MG capsule; Take 1 capsule by mouth 4 (Four) Times a Day Before Meals & at Bedtime.    Right lower quadrant abdominal pain  -     XR Abdomen Flat & Upright (In Office)  -     Cancel: CBC (No Diff)  -     Cancel: Comprehensive metabolic panel  -     Ambulatory Referral to Gastroenterology    Nausea  -     XR Abdomen Flat & Upright (In Office)  -     Cancel: CBC (No Diff)  -     Cancel: Comprehensive metabolic panel  -     Ambulatory Referral to Gastroenterology    Diarrhea, unspecified type  -     XR Abdomen Flat & Upright (In Office)  -     Cancel: CBC (No Diff)  -     Cancel: Comprehensive metabolic panel  -     Ambulatory Referral to Gastroenterology    Constipation, unspecified constipation type  -     XR Abdomen Flat & Upright (In Office)  -     Cancel: CBC (No Diff)  -     Cancel: Comprehensive metabolic panel  -     Ambulatory Referral to Gastroenterology    Flat/Upright abdomen- unremarkable. No constipation or bowel blockage noted.  Irritable Bowel Syndrome- Pt educated on diet (things to avoid, more water, more fiber) that can help with symptoms of IBS. IBS educational sheet provided. Pt instructed to take Bentyl 10  QID AC/bedtime and Colestid 1g tablets 1-2 daily (pt says she still has some at home). This has been an ongoing issue, she needs further evaluation, ambulatory referral to GI.   If symptoms do not improve or worsen, patient was instructed to return to clinic for further evaluation.         This document has been electronically signed by PEDRO Bustillo on  August 9, 2019 1:04 PM

## 2019-08-13 ENCOUNTER — OFFICE VISIT (OUTPATIENT)
Dept: GASTROENTEROLOGY | Facility: CLINIC | Age: 44
End: 2019-08-13

## 2019-08-13 VITALS
WEIGHT: 214 LBS | DIASTOLIC BLOOD PRESSURE: 80 MMHG | HEIGHT: 64 IN | HEART RATE: 68 BPM | BODY MASS INDEX: 36.54 KG/M2 | SYSTOLIC BLOOD PRESSURE: 120 MMHG

## 2019-08-13 DIAGNOSIS — R10.84 GENERALIZED ABDOMINAL PAIN: Primary | ICD-10-CM

## 2019-08-13 DIAGNOSIS — R19.4 CHANGE IN BOWEL HABITS: ICD-10-CM

## 2019-08-13 PROCEDURE — 99214 OFFICE O/P EST MOD 30 MIN: CPT | Performed by: NURSE PRACTITIONER

## 2019-08-13 RX ORDER — SODIUM, POTASSIUM,MAG SULFATES 17.5-3.13G
1 SOLUTION, RECONSTITUTED, ORAL ORAL EVERY 12 HOURS
Qty: 2 BOTTLE | Refills: 0 | Status: SHIPPED | OUTPATIENT
Start: 2019-08-13 | End: 2019-09-18 | Stop reason: HOSPADM

## 2019-08-13 RX ORDER — DEXTROSE AND SODIUM CHLORIDE 5; .45 G/100ML; G/100ML
30 INJECTION, SOLUTION INTRAVENOUS CONTINUOUS PRN
Status: CANCELLED | OUTPATIENT
Start: 2019-09-18

## 2019-08-13 NOTE — PROGRESS NOTES
Chief Complaint   Patient presents with   • Abdominal Pain   • Irritable Bowel Syndrome   • Constipation   • Diarrhea       Subjective    Suzi Heck is a 44 y.o. female. she is being seen for consultation today at the request of PEDRO Bustillo     44-year-old female presents to discuss-year-old bowel syndrome abdominal pain and diarrhea.  States symptoms have been ongoing for 13 years wax and wane over the years.  She was scheduled for colonoscopy around the time of onset 13 years ago but unable to do before procedure and has not had it done.  She was evaluated recently 8/9/2019 for primary care provider given Bentyl states it caused severe bloating and had flat x-ray which showed no acute abnormality.  She has tried Colestid without relief in symptoms.    Abdominal Pain   This is a chronic problem. The current episode started more than 1 year ago. The onset quality is gradual. The problem has been waxing and waning. The pain is located in the generalized abdominal region. The abdominal pain radiates to the RLQ (most severe in RLQ feels like hot poker ). Associated symptoms include constipation, diarrhea and nausea. Pertinent negatives include no arthralgias, fever or vomiting. Her past medical history is significant for irritable bowel syndrome.   Irritable Bowel Syndrome   Associated symptoms include abdominal pain, fatigue and nausea. Pertinent negatives include no arthralgias, chills, diaphoresis, fever, sore throat or vomiting.   Constipation   Associated symptoms include abdominal pain, diarrhea and nausea. Pertinent negatives include no fever, rectal pain or vomiting. Her past medical history is significant for irritable bowel syndrome.   Diarrhea    This is a chronic problem. The current episode started more than 1 year ago. The problem occurs 5 to 10 times per day. The stool consistency is described as watery (sometimes stringy ). The patient states that diarrhea awakens her from sleep. Associated  symptoms include abdominal pain. Pertinent negatives include no arthralgias, chills, fever or vomiting. The symptoms are aggravated by dairy products. She has tried change of diet and anti-motility drug (colestid and bentyl ) for the symptoms. Her past medical history is significant for irritable bowel syndrome.     Plan; CT abdomen pelvis due to tenderness on exam schedule patient for EGD and colonoscopy due to abdominal pain and changes in bowel habits.  Follow-up after test return office sooner if needed       The following portions of the patient's history were reviewed and updated as appropriate:   Past Medical History:   Diagnosis Date   • Abdominal pain     right lower quadrant      • Allergy     seasonal   • Conjunctivitis    • Encounter for routine gynecological examination    • Gastroesophageal reflux disease    • Irritable bowel syndrome with diarrhea    • Pigmented skin lesion    • PONV (postoperative nausea and vomiting)    • Screening examination for venereal disease    • Viral gastroenteritis      Past Surgical History:   Procedure Laterality Date   •  SECTION  2007    (1) - Primary low transverse C section   • CHOLECYSTECTOMY     • INJECTION OF MEDICATION  2015    Zofran (1) - TRINA Whittington   • OTHER SURGICAL HISTORY       Foot/toes surgery procedure (1)  -  x 2   • OTHER SURGICAL HISTORY  2007    EXAM OF CERVIX W/SCOPE 70613 (1) - mild dysplasia(JOANIE I) Squamoglandular mucosa. Chronic cervicitis. Curettings, Endocervix: unremarkable columnar cell epithelium   • OTHER SURGICAL HISTORY  2016    Biopsy, Each Additional Lesion 91019 (1) - LONNIE Maldonado   • OTHER SURGICAL HISTORY  2010    Remove Impacted Cerumen 69204 (1) - DIONISIO Eaton   • PAP SMEAR  10/15/2007    (1) - Epithelial cell abnormality: squamous cells, Atypical squamouscells of undetermined significance. cannot exclude HSIL/(ASC-H)    • SKIN BIOPSY  2016    Biopsy of Skin 29889 (1) - LONNIE Maldonado   • THUMB  "CARPOMETACARPAL JOINT ARTHROPLASTY FLEXOR CARPI RADIALIS TENDON Right 2017    Procedure: THUMB CARPOMETACARPAL JOINT ARTHROPLASTY FLEXOR CARPI RADIALIS TENDON RELEASE  EXCISION OF GANGLLION AND RELEASE OF A1 PULLEY WRIST EXTENSOR DECREASED VEINS DISEASE;  Surgeon: Kolton Emery MD;  Location: [facility];  Service:      Family History   Problem Relation Age of Onset   • Diabetes Father    • Prostate cancer Father    • Colon cancer Father      OB History      Para Term  AB Living    1 1 1     1    SAB TAB Ectopic Molar Multiple Live Births              1        Prior to Admission medications    Medication Sig Start Date End Date Taking? Authorizing Provider   diclofenac (VOLTAREN) 50 MG EC tablet Take 1 tablet by mouth 2 (Two) Times a Day. 9/10/18  Yes Mar Rodriges APRN   dicyclomine (BENTYL) 10 MG capsule Take 1 capsule by mouth 4 (Four) Times a Day Before Meals & at Bedtime. 19  Yes Mary Alfaro APRN   omeprazole (priLOSEC) 40 MG capsule TAKE 1 CAPSULE DAILY 19  Yes Nicholas Maldonado MD     Allergies   Allergen Reactions   • Adhesive Tape Other (See Comments)     blisters   • Codeine      UNKNOWN REACTION   • Demerol [Meperidine] Mental Status Change   • Vicodin [Hydrocodone-Acetaminophen] Other (See Comments)     \"non functional\"     Social History     Socioeconomic History   • Marital status:      Spouse name: Not on file   • Number of children: Not on file   • Years of education: Not on file   • Highest education level: Not on file   Tobacco Use   • Smoking status: Current Every Day Smoker     Packs/day: 0.75     Years: 25.00     Pack years: 18.75     Types: Cigarettes     Start date: 1990   • Smokeless tobacco: Never Used   • Tobacco comment: Current Smoker -  e-cig   Substance and Sexual Activity   • Alcohol use: No   • Drug use: No   • Sexual activity: Yes     Partners: Male     Birth control/protection: Surgical     Comment: Vasectomy       Review of " "Systems  Review of Systems   Constitutional: Positive for activity change, appetite change and fatigue. Negative for chills, diaphoresis, fever and unexpected weight change.   HENT: Negative for sore throat and trouble swallowing.    Respiratory: Negative for shortness of breath.    Gastrointestinal: Positive for abdominal pain, constipation, diarrhea and nausea. Negative for abdominal distention, anal bleeding, blood in stool, rectal pain and vomiting.   Musculoskeletal: Negative for arthralgias.   Skin: Negative for pallor.   Neurological: Negative for light-headedness.        /80 (BP Location: Left arm)   Pulse 68   Ht 162.6 cm (64\")   Wt 97.1 kg (214 lb)   BMI 36.73 kg/m²     Objective    Physical Exam   Constitutional: She is oriented to person, place, and time. She appears well-developed and well-nourished. She is cooperative. No distress.   HENT:   Head: Normocephalic and atraumatic.   Neck: Normal range of motion. Neck supple. No thyromegaly present.   Cardiovascular: Normal rate, regular rhythm and normal heart sounds.   Pulmonary/Chest: Effort normal and breath sounds normal. She has no wheezes. She has no rhonchi. She has no rales.   Abdominal: Soft. Normal appearance and bowel sounds are normal. She exhibits no distension. There is no hepatosplenomegaly. There is generalized tenderness (most tender RLQ ). There is no rigidity and no guarding. No hernia.   Lymphadenopathy:     She has no cervical adenopathy.   Neurological: She is alert and oriented to person, place, and time.   Skin: Skin is warm, dry and intact. No rash noted. No pallor.   Psychiatric: She has a normal mood and affect. Her speech is normal.     Office Visit on 05/31/2018   Component Date Value Ref Range Status   • Case Report 05/31/2018    Final                    Value:Gynecologic Cytology Report                       Case: [id number]                                  Authorizing Provider:  PEDRO Puentes        " Collected:           05/31/2018 03:07 PM          Ordering Location:     [facility]     Received:            06/01/2018 08:35 AM                                 [facility]                                                                 First Screen:          Ysabel Dickson                                                           Specimen:    Liquid-Based Pap, Screening, Cervix                                                       • Interpretation 05/31/2018 Negative for intraepithelial lesion or malignancy    Final   • Other Findings 05/31/2018 Shift in bebo suggestive of bacterial vaginosis  Mild inflammation   Final   • Specimen Adequacy 05/31/2018 Satisfactory for evaluation, endocervical/transformation zone component present   Final   • Additional Information 05/31/2018    Final                    Value:This result contains rich text formatting which cannot be displayed here.   • HPV Aptima 05/31/2018 Negative  Negative Final    This test detects fourteen high-risk HPV types (16/18/31/33/35/39/45/  51/52/56/58/59/66/68) without differentiation.     Assessment/Plan      1. Generalized abdominal pain    2. Change in bowel habits    .       Orders placed during this encounter include:  Orders Placed This Encounter   Procedures   • CT Abdomen Pelvis With Contrast     Standing Status:   Future     Standing Expiration Date:   8/13/2020     Order Specific Question:   Will Oral Contrast be needed for this procedure?     Answer:   Yes     Order Specific Question:   Patient Pregnant     Answer:   No   • Follow Anesthesia Guidelines / Standing Orders     Standing Status:   Future   • Obtain Informed Consent     Standing Status:   Future     Order Specific Question:   Informed Consent Given For     Answer:   ESOPHAGOGASTRODUODENOSCOPY and Colonoscopy       ESOPHAGOGASTRODUODENOSCOPY (N/A), COLONOSCOPY (N/A)    Review and/or summary of lab tests, radiology, procedures, medications. Review and summary of  old records and obtaining of history. The risks and benefits of my recommendations, as well as other treatment options were discussed with the patient today. Questions were answered.    New Medications Ordered This Visit   Medications   • sodium-potassium-magnesium sulfates (SUPREP BOWEL PREP KIT) 17.5-3.13-1.6 GM/177ML solution oral solution     Sig: Take 1 bottle by mouth Every 12 (Twelve) Hours.     Dispense:  2 bottle     Refill:  0   • rifaximin (XIFAXAN) 550 MG tablet     Sig: Take 1 tablet by mouth 3 (Three) Times a Day.     Dispense:  42 tablet     Refill:  1       Follow-up: Return in about 4 weeks (around 9/10/2019).          This document has been electronically signed by PEDRO Smith on August 13, 2019 9:28 AM             Results for orders placed or performed in visit on 05/31/18   HPV DNA Probe, Direct - ThinPrep Vial, Vagina   Result Value Ref Range    HPV Aptima Negative Negative   Liquid-based Pap Smear, Screening   Result Value Ref Range    Case Report       Gynecologic Cytology Report                       Case: [id number]                                  Authorizing Provider:  PEDRO Puentes        Collected:           05/31/2018 03:07 PM          [facility]     [facility]     Received:            06/01/2018 08:35 AM                                 [facility]                                                                 First Screen:          Ysabel Dickson                                                           Specimen:    Liquid-Based Pap, Screening, Cervix                                                        Interpretation Negative for intraepithelial lesion or malignancy      Other Findings       Shift in bebo suggestive of bacterial vaginosis  Mild inflammation    Specimen Adequacy       Satisfactory for evaluation, endocervical/transformation zone component present    Additional Information       Disclaimer: Cervical cytology is a screening test  primarily for squamous cancer and its precursors and has associated false-negative and false-positive results.  Technologies such as liquid-based preparations may decrease but will not eliminate all false-negative results.  Follow-up of unexplained clinical signs and symptoms is recommended to minimize false-negative results. (The Baggs System for Reporting Cervical Cytology: [place], 2015).      Embedded Images     Results for orders placed or performed in visit on 03/29/18   CBC (No Diff)   Result Value Ref Range    WBC 12.87 (H) 3.20 - 9.80 10*3/mm3    RBC 4.80 3.77 - 5.16 10*6/mm3    Hemoglobin 12.5 12.0 - 15.5 g/dL    Hematocrit 38.5 35.0 - 45.0 %    MCV 80.2 80.0 - 98.0 fL    MCH 26.0 (L) 26.5 - 34.0 pg    MCHC 32.5 31.4 - 36.0 g/dL    RDW 15.1 (H) 11.5 - 14.5 %    RDW-SD 44.2 36.4 - 46.3 fl    MPV 10.1 8.0 - 12.0 fL    Platelets 388 150 - 450 10*3/mm3   Lipid Panel   Result Value Ref Range    Total Cholesterol 138 0 - 199 mg/dL    Triglycerides 347 (H) 20 - 199 mg/dL    HDL Cholesterol 35 (L) 60 - 200 mg/dL    LDL Cholesterol  71 1 - 129 mg/dL    LDL/HDL Ratio 0.96 0.00 - 3.22   Comprehensive Metabolic Panel   Result Value Ref Range    Glucose 100 60 - 100 mg/dL    BUN 10 7 - 21 mg/dL    Creatinine 0.81 0.50 - 1.00 mg/dL    Sodium 142 137 - 145 mmol/L    Potassium 4.6 3.5 - 5.1 mmol/L    Chloride 106 95 - 110 mmol/L    CO2 23.0 22.0 - 31.0 mmol/L    Calcium 8.8 8.4 - 10.2 mg/dL    Total Protein 6.5 6.3 - 8.6 g/dL    Albumin 3.50 3.40 - 4.80 g/dL    ALT (SGPT) 50 9 - 52 U/L    AST (SGOT) 29 14 - 36 U/L    Alkaline Phosphatase 86 38 - 126 U/L    Total Bilirubin <0.1 (L) 0.2 - 1.3 mg/dL    eGFR Non  Amer 78 58 - 135 mL/min/1.73    Globulin 3.0 2.3 - 3.5 gm/dL    A/G Ratio 1.2 1.1 - 1.8 g/dL    BUN/Creatinine Ratio 12.3 7.0 - 25.0    Anion Gap 13.0 5.0 - 15.0 mmol/L   Results for orders placed or performed during the hospital encounter of 04/13/17   Tissue Exam   Result Value Ref Range    Case Report    "    Surgical Pathology Report                         Case: [id number]                                  Authorizing Provider:  Kolton Emery MD         Collected:           04/13/2017 09:20 AM          Ordering Location:     [facility]             Received:            04/13/2017 10:33 AM                                 [place]                                                              Pathologist:           Adrian Song MD                                                          Specimen:    Wrist, Right, bone right wrist                                                             Final Diagnosis       BONE, RIGHT WRIST:   FRAGMENTS OF BONE NEGATIVE FOR OSTEOMYELITIS.       Gross Description       The container is labeled \"bone, right wrist\" and has fragments of bone in relation to bits of yellowish tissue.  The largest fragment measures 2.0 x 0.9 x 0.6 cm.  The next largest fragment measures 1.0 x 1.1 x 0.4 cm.  The remaining fragments measure 1.0 cc in aggregate.  The entire specimen is decalcified and representative sections are embedded.        Embedded Images     Results for orders placed or performed in visit on 04/12/17   Pregnancy, Urine   Result Value Ref Range    HCG, Urine QL Negative Negative   Results for orders placed or performed in visit on 01/24/17   Rheumatoid factor   Result Value Ref Range    Rheumatoid Factor Qualitative Negative Negative   Results for orders placed or performed in visit on 01/23/17   JES w/Reflex   Result Value Ref Range    JES Direct Positive (A) Negative   JES Comprehensive Panel   Result Value Ref Range    Anti-DNA (DS) Ab Qn 1 0 - 9 IU/mL    RNP Antibodies <0.2 0.0 - 0.9 AI    Leal Antibodies <0.2 0.0 - 0.9 AI    Antiscleroderma-70 Antibodies <0.2 0.0 - 0.9 AI    MICHAEL SSA (RO) Ab <0.2 0.0 - 0.9 AI    MICHAEL SSB (LA) Ab <0.2 0.0 - 0.9 AI    Antichromatin Antibodies <0.2 0.0 - 0.9 AI    ILSA-1 IgG <0.2 0.0 - 0.9 AI    Anti-Centromere B Antibodies <0.2 0.0 - 0.9 " AI    See below: Comment    Cyclic citrul peptide antibody, IgG/IgA   Result Value Ref Range    CCP Antibodies IgG/IgA 6 0 - 19 units   Sedimentation rate   Result Value Ref Range    Sed Rate 18 0 - 20 mm/hr   C-reactive protein   Result Value Ref Range    C-Reactive Protein 1.70 (H) 0.00 - 1.00 mg/dL   CK   Result Value Ref Range    Creatine Kinase 52 30 - 135 U/L   Results for orders placed or performed during the hospital encounter of 01/17/17   Ferritin   Result Value Ref Range    Ferritin 7.4 6.2 - 137.0 ng/ml   Results for orders placed or performed in visit on 01/17/17   Vitamin B1, Whole Blood   Result Value Ref Range    Vitamin B1, Whole Blood 201.4 (H) 66.5 - 200.0 nmol/L   TSH   Result Value Ref Range    TSH 3.81 0.46 - 4.68 uIU/ml   Vitamin B6   Result Value Ref Range    Vitamin B6 4.4 2.0 - 32.8 ug/L   Folate   Result Value Ref Range    Folate 4.04 ng/ml   Vitamin B12   Result Value Ref Range    Vitamin B-12 246 239 - 931 pg/ml   Results for orders placed or performed during the hospital encounter of 06/15/16   Converted Surgical Pathology   Result Value Ref Range    Spec Descr 1 SPECIMEN(S): A LESION, LEFT BACK     Specimen description 2 SPECIMEN(S): B LESION, MIDDLE BACK     Specimen description 3 SPECIMEN(S): C LESION, RIGHT BACK    Results for orders placed or performed in visit on 03/16/12   Converted (Historical) Gyn Cytology   Result Value Ref Range    Spec Descr 1 SPECIMEN(S): Cervical/Endocervical     Clinical Information       CLINICAL HISTORY: Reason for Pap Smear: Routine Smear, LMP: 3 wks ago    Specimen Adequacy         SPECIMEN ADEQUACY:  Satisfactory for evaluation:  endocervical/transformation zone component  present.      Microorganisms         MICROORGANISMS:  Shift in bebo suggestive of bacterial vaginosis.      Final Diagnosis         INTERPRETATION:  Negative for intraepithelial lesion or malignancy.      CONVERTED (HISTORICAL) FINAL PATHOLOGIST       Diagnostician:  FRANCA  "[facility])  Cytotechnologist  Electronically Signed 03/19/2012      ICD9 Diagnosis Code 1 ICD-9: V72.31     DISCLAIMER       The Pap smear is a screening test that aids in the detection of cervical  cancer and cancer precursors.  Both false positive and false negative  results can occur.  The test should be used at regular intervals, and positive results  should be confirmed before definitive therapy.     Results for orders placed or performed in visit on 11/01/07   Converted Surgical Pathology   Result Value Ref Range    Spec Descr 1 SPECIMEN(S): A CERVICAL BIOPSY @ 6:00     Specimen description 2 SPECIMEN(S): B ENDOCERVICAL CURRETTINGS     Clinical Information   CLINICAL HISTORY:  ASCUS, cannot R/O HGSIL       Gross Description         GROSS DESCRIPTION:  The first container is labeled \"uterine cervix\" and has a nodular  fragment of white soft tissue measuring 0.4 cm in greatest diameter.  It  is entirely embedded as A.  The second container has dark red mucoid material measuring 1.0 cc in  aggregate.  The entire specimen is embedded as B.      Final Diagnosis         FINAL DIAGNOSIS:  A.  UTERINE CERVIX AT 6 O'CLOCK:            MILD DYSPLASIA (JOANIE I), SQUAMOGLANDULAR MUCOSA.            CHRONIC CERVICITIS.  B.  CURETTINGS, ENDOCERVIX:            UNREMARKABLE COLUMNAR CELL EPITHELIUM.    DIAGNOSIS CODE:  4      Comment   CLINICAL DIAGNOSIS:  JOANIE I-II       CONVERTED (HISTORICAL) FINAL PATHOLOGIST       Diagnostician:  ANGELITO DOE M.D.  Pathologist  Electronically Signed 11/05/2007     Results for orders placed or performed in visit on 10/15/07   Converted (Historical) Gyn Cytology   Result Value Ref Range    Spec Descr 1 SPECIMEN(S): Cervical/Endocervical     Clinical Information       CLINICAL HISTORY: Reason for Pap Smear: Routine Smear, LMP: No LMP  Provided by Clinician      Comment         SPECIMEN ADEQUACY:  Satisfactory for evaluation:  endocervical/transformation zone " component  present.    MICROORGANISMS:  Shift in bebo suggestive of bacterial vaginosis.    INTERPRETATION:  Epithelial cell abnormality:  squamous cells, Atypical squamous cells of  undetermined significance cannot exclude HSIL/High Grade CATRACHITA (ASC-H).      CONVERTED (HISTORICAL) FINAL PATHOLOGIST       Diagnostician:  FRANCA PAUL(ASCP)  Cytotechnologist      Final Pathologist/Cyto tech 3       Diagnostician:  MARICRUZ BLAKELY M.D.  Pathologist  Electronically Signed 10/18/2007      ICD9 Diagnosis Code 1 ICD-9: V72.31     ICD9 Diagnosis Code 2 ICD-9: 795.01

## 2019-08-13 NOTE — PATIENT INSTRUCTIONS

## 2019-08-19 DIAGNOSIS — R10.84 GENERALIZED ABDOMINAL PAIN: Primary | ICD-10-CM

## 2019-08-20 ENCOUNTER — HOSPITAL ENCOUNTER (OUTPATIENT)
Dept: CT IMAGING | Facility: HOSPITAL | Age: 44
Discharge: HOME | End: 2019-08-20
Admitting: NURSE PRACTITIONER

## 2019-08-20 ENCOUNTER — LAB (OUTPATIENT)
Dept: LAB | Facility: HOSPITAL | Age: 44
End: 2019-08-20

## 2019-08-20 DIAGNOSIS — R19.4 CHANGE IN BOWEL HABITS: ICD-10-CM

## 2019-08-20 DIAGNOSIS — R10.84 GENERALIZED ABDOMINAL PAIN: ICD-10-CM

## 2019-08-20 LAB — B-HCG UR QL: NEGATIVE

## 2019-08-20 PROCEDURE — 74177 CT ABD & PELVIS W/CONTRAST: CPT

## 2019-08-20 PROCEDURE — 81025 URINE PREGNANCY TEST: CPT

## 2019-08-20 PROCEDURE — 25010000002 IOPAMIDOL 61 % SOLUTION: Performed by: NURSE PRACTITIONER

## 2019-08-20 RX ADMIN — IOPAMIDOL 96 ML: 612 INJECTION, SOLUTION INTRAVENOUS at 08:27

## 2019-08-23 ENCOUNTER — TELEPHONE (OUTPATIENT)
Dept: GASTROENTEROLOGY | Facility: CLINIC | Age: 44
End: 2019-08-23

## 2019-08-23 RX ORDER — AMOXICILLIN AND CLAVULANATE POTASSIUM 875; 125 MG/1; MG/1
1 TABLET, FILM COATED ORAL 2 TIMES DAILY
Qty: 14 TABLET | Refills: 0 | Status: SHIPPED | OUTPATIENT
Start: 2019-08-23 | End: 2019-08-26 | Stop reason: SDUPTHER

## 2019-08-23 RX ORDER — METRONIDAZOLE 500 MG/1
500 TABLET ORAL 3 TIMES DAILY
Qty: 21 TABLET | Refills: 0 | Status: SHIPPED | OUTPATIENT
Start: 2019-08-23 | End: 2019-08-26 | Stop reason: SDUPTHER

## 2019-08-23 NOTE — TELEPHONE ENCOUNTER
Spoke with patient regarding results of ct. Relayed results and recommendations to patient oer PEDRO Patel. Patient voiced understanding. Prescriptions are to be sent to [facility].      ----- Message from EPDRO Menendez sent at 8/22/2019  2:39 PM CDT -----  Please call patient with results. Ileum and colon are inflamed and I want to send in  Cipro and Flagyl for two weeks. Please let me know which pharmacy. Recommend bland diet, push fluids and rest. I tried to call patient and no answer.

## 2019-08-26 RX ORDER — METRONIDAZOLE 500 MG/1
500 TABLET ORAL 3 TIMES DAILY
Qty: 21 TABLET | Refills: 0 | Status: SHIPPED | OUTPATIENT
Start: 2019-08-26 | End: 2019-09-12

## 2019-08-26 RX ORDER — AMOXICILLIN AND CLAVULANATE POTASSIUM 875; 125 MG/1; MG/1
1 TABLET, FILM COATED ORAL 2 TIMES DAILY
Qty: 14 TABLET | Refills: 0 | Status: SHIPPED | OUTPATIENT
Start: 2019-08-26 | End: 2019-09-12

## 2019-09-05 RX ORDER — OMEPRAZOLE 40 MG/1
40 CAPSULE, DELAYED RELEASE ORAL DAILY
Qty: 90 CAPSULE | Refills: 3 | Status: SHIPPED | OUTPATIENT
Start: 2019-09-05 | End: 2021-01-11 | Stop reason: SDUPTHER

## 2019-09-05 RX ORDER — OMEPRAZOLE 40 MG/1
40 CAPSULE, DELAYED RELEASE ORAL DAILY
Qty: 90 CAPSULE | Refills: 3 | Status: SHIPPED | OUTPATIENT
Start: 2019-09-05 | End: 2019-09-05 | Stop reason: SDUPTHER

## 2019-09-05 RX ORDER — OMEPRAZOLE 40 MG/1
CAPSULE, DELAYED RELEASE ORAL
Qty: 90 CAPSULE | Refills: 4 | OUTPATIENT
Start: 2019-09-05

## 2019-09-05 NOTE — TELEPHONE ENCOUNTER
Gayathri, [facility].  Who ever she does not want the rx from, let them know not to fill it please.

## 2019-09-05 NOTE — TELEPHONE ENCOUNTER
Called patient, she [facility]. So I called [facility] and canceled that one.

## 2019-09-18 ENCOUNTER — ANESTHESIA (OUTPATIENT)
Dept: GASTROENTEROLOGY | Facility: HOSPITAL | Age: 44
End: 2019-09-18

## 2019-09-18 ENCOUNTER — HOSPITAL ENCOUNTER (OUTPATIENT)
Facility: HOSPITAL | Age: 44
Setting detail: HOSPITAL OUTPATIENT SURGERY
Discharge: HOME | End: 2019-09-18
Attending: INTERNAL MEDICINE | Admitting: INTERNAL MEDICINE

## 2019-09-18 ENCOUNTER — ANESTHESIA EVENT (OUTPATIENT)
Dept: GASTROENTEROLOGY | Facility: HOSPITAL | Age: 44
End: 2019-09-18

## 2019-09-18 VITALS
BODY MASS INDEX: 34.78 KG/M2 | DIASTOLIC BLOOD PRESSURE: 72 MMHG | HEIGHT: 64 IN | OXYGEN SATURATION: 97 % | RESPIRATION RATE: 18 BRPM | TEMPERATURE: 96.9 F | HEART RATE: 103 BPM | SYSTOLIC BLOOD PRESSURE: 117 MMHG | WEIGHT: 203.71 LBS

## 2019-09-18 DIAGNOSIS — R10.84 GENERALIZED ABDOMINAL PAIN: ICD-10-CM

## 2019-09-18 DIAGNOSIS — R19.4 CHANGE IN BOWEL HABITS: ICD-10-CM

## 2019-09-18 PROCEDURE — 25010000002 PROPOFOL 10 MG/ML EMULSION: Performed by: NURSE ANESTHETIST, CERTIFIED REGISTERED

## 2019-09-18 PROCEDURE — 88305 TISSUE EXAM BY PATHOLOGIST: CPT | Performed by: PATHOLOGY

## 2019-09-18 PROCEDURE — 88305 TISSUE EXAM BY PATHOLOGIST: CPT | Performed by: INTERNAL MEDICINE

## 2019-09-18 PROCEDURE — 45380 COLONOSCOPY AND BIOPSY: CPT | Performed by: INTERNAL MEDICINE

## 2019-09-18 PROCEDURE — 43239 EGD BIOPSY SINGLE/MULTIPLE: CPT | Performed by: INTERNAL MEDICINE

## 2019-09-18 RX ORDER — DEXTROSE AND SODIUM CHLORIDE 5; .45 G/100ML; G/100ML
30 INJECTION, SOLUTION INTRAVENOUS CONTINUOUS PRN
Status: DISCONTINUED | OUTPATIENT
Start: 2019-09-18 | End: 2019-09-18 | Stop reason: HOSPADM

## 2019-09-18 RX ORDER — PROPOFOL 10 MG/ML
VIAL (ML) INTRAVENOUS AS NEEDED
Status: DISCONTINUED | OUTPATIENT
Start: 2019-09-18 | End: 2019-09-18 | Stop reason: SURG

## 2019-09-18 RX ORDER — LIDOCAINE HYDROCHLORIDE 20 MG/ML
INJECTION, SOLUTION INTRAVENOUS AS NEEDED
Status: DISCONTINUED | OUTPATIENT
Start: 2019-09-18 | End: 2019-09-18 | Stop reason: SURG

## 2019-09-18 RX ORDER — ONDANSETRON 2 MG/ML
4 INJECTION INTRAMUSCULAR; INTRAVENOUS ONCE AS NEEDED
Status: DISCONTINUED | OUTPATIENT
Start: 2019-09-18 | End: 2019-09-18 | Stop reason: HOSPADM

## 2019-09-18 RX ADMIN — PROPOFOL 90 MG: 10 INJECTION, EMULSION INTRAVENOUS at 14:36

## 2019-09-18 RX ADMIN — DEXTROSE AND SODIUM CHLORIDE 30 ML/HR: 5; 450 INJECTION, SOLUTION INTRAVENOUS at 13:37

## 2019-09-18 RX ADMIN — PROPOFOL 20 MG: 10 INJECTION, EMULSION INTRAVENOUS at 14:42

## 2019-09-18 RX ADMIN — PROPOFOL 20 MG: 10 INJECTION, EMULSION INTRAVENOUS at 14:44

## 2019-09-18 RX ADMIN — PROPOFOL 20 MG: 10 INJECTION, EMULSION INTRAVENOUS at 14:41

## 2019-09-18 RX ADMIN — PROPOFOL 20 MG: 10 INJECTION, EMULSION INTRAVENOUS at 14:46

## 2019-09-18 RX ADMIN — PROPOFOL 20 MG: 10 INJECTION, EMULSION INTRAVENOUS at 14:40

## 2019-09-18 RX ADMIN — PROPOFOL 20 MG: 10 INJECTION, EMULSION INTRAVENOUS at 14:37

## 2019-09-18 RX ADMIN — PROPOFOL 20 MG: 10 INJECTION, EMULSION INTRAVENOUS at 14:48

## 2019-09-18 RX ADMIN — PROPOFOL 20 MG: 10 INJECTION, EMULSION INTRAVENOUS at 14:50

## 2019-09-18 RX ADMIN — LIDOCAINE HYDROCHLORIDE 60 MG: 20 INJECTION, SOLUTION INTRAVENOUS at 14:36

## 2019-09-18 RX ADMIN — PROPOFOL 20 MG: 10 INJECTION, EMULSION INTRAVENOUS at 14:38

## 2019-09-18 NOTE — ANESTHESIA POSTPROCEDURE EVALUATION
Patient: Suzi Heck    Procedure Summary     Date:  09/18/19 Room / Location:  [facility] ENDOSCOPY 1 / [facility] ENDOSCOPY    Anesthesia Start:  1429 Anesthesia Stop:  1457    Procedures:       ESOPHAGOGASTRODUODENOSCOPY (N/A )      COLONOSCOPY (N/A ) Diagnosis:       Generalized abdominal pain      Change in bowel habits      (Generalized abdominal pain [R10.84])      (Change in bowel habits [R19.4])    Surgeon:  Myles Ferraro MD Provider:  Gómez Red CRNA    Anesthesia Type:  MAC ASA Status:  2          Anesthesia Type: MAC  Last vitals  BP   118/84 (09/18/19 1330)   Temp   97.3 °F (36.3 °C) (09/18/19 1330)   Pulse   112 (09/18/19 1330)   Resp   16 (09/18/19 1330)     SpO2   98 % (09/18/19 1330)     Post Anesthesia Care and Evaluation    Patient location during evaluation: bedside  Level of consciousness: sleepy but conscious  Pain score: 0  Pain management: adequate  Airway patency: patent  Anesthetic complications: No anesthetic complications  PONV Status: none  Cardiovascular status: acceptable and hemodynamically stable  Respiratory status: acceptable and spontaneous ventilation  Hydration status: acceptable

## 2019-09-18 NOTE — ANESTHESIA PREPROCEDURE EVALUATION
Anesthesia Evaluation     Patient summary reviewed and Nursing notes reviewed   history of anesthetic complications: PONV  NPO Solid Status: > 8 hours  NPO Liquid Status: > 2 hours           Airway   Mallampati: II  TM distance: >3 FB  Dental - normal exam     Pulmonary - normal exam   (+) a smoker Current Smoked day of surgery,   Cardiovascular - negative cardio ROS and normal exam        Neuro/Psych  (+) numbness,     GI/Hepatic/Renal/Endo    (+) obesity, morbid obesity, GERD well controlled,      Musculoskeletal     Abdominal   (+) obese,    Substance History - negative use     OB/GYN negative ob/gyn ROS         Other   (+) arthritis                       Anesthesia Plan    ASA 2     MAC     intravenous induction   Anesthetic plan, all risks, benefits, and alternatives have been provided, discussed and informed consent has been obtained with: patient.

## 2019-09-18 NOTE — H&P
No chief complaint on file.      Subjective    Suzi Heck is a 44 y.o. female. she is being seen for consultation today at the request of PEDRO Bustillo     44-year-old female presents to discuss-year-old bowel syndrome abdominal pain and diarrhea.  States symptoms have been ongoing for 13 years wax and wane over the years.  She was scheduled for colonoscopy around the time of onset 13 years ago but unable to do before procedure and has not had it done.  She was evaluated recently 8/9/2019 for primary care provider given Bentyl states it caused severe bloating and had flat x-ray which showed no acute abnormality.  She has tried Colestid without relief in symptoms.    Abdominal Pain   This is a chronic problem. The current episode started more than 1 year ago. The onset quality is gradual. The problem has been waxing and waning. The pain is located in the generalized abdominal region. The abdominal pain radiates to the RLQ (most severe in RLQ feels like hot poker ). Associated symptoms include constipation, diarrhea and nausea. Pertinent negatives include no arthralgias, fever or vomiting. Her past medical history is significant for irritable bowel syndrome.   Irritable Bowel Syndrome   Associated symptoms include abdominal pain, fatigue and nausea. Pertinent negatives include no arthralgias, chills, diaphoresis, fever, sore throat or vomiting.   Constipation   Associated symptoms include abdominal pain, diarrhea and nausea. Pertinent negatives include no fever, rectal pain or vomiting. Her past medical history is significant for irritable bowel syndrome.   Diarrhea    This is a chronic problem. The current episode started more than 1 year ago. The problem occurs 5 to 10 times per day. The stool consistency is described as watery (sometimes stringy ). The patient states that diarrhea awakens her from sleep. Associated symptoms include abdominal pain. Pertinent negatives include no arthralgias, chills,  fever or vomiting. The symptoms are aggravated by dairy products. She has tried change of diet and anti-motility drug (colestid and bentyl ) for the symptoms. Her past medical history is significant for irritable bowel syndrome.     Plan; CT abdomen pelvis due to tenderness on exam schedule patient for EGD and colonoscopy due to abdominal pain and changes in bowel habits.  Follow-up after test return office sooner if needed       The following portions of the patient's history were reviewed and updated as appropriate:   Past Medical History:   Diagnosis Date   • Abdominal pain     right lower quadrant      • Allergy     seasonal   • Conjunctivitis    • Encounter for routine gynecological examination    • Gastroesophageal reflux disease    • Irritable bowel syndrome with diarrhea    • Pigmented skin lesion    • PONV (postoperative nausea and vomiting)    • Screening examination for venereal disease    • Viral gastroenteritis      Past Surgical History:   Procedure Laterality Date   •  SECTION  2007    (1) - Primary low transverse C section   • CHOLECYSTECTOMY     • INJECTION OF MEDICATION  2015    Zofran (1) - TRINA Whittington   • OTHER SURGICAL HISTORY       Foot/toes surgery procedure (1)  -  x 2   • OTHER SURGICAL HISTORY  2007    EXAM OF CERVIX W/SCOPE 06050 (1) - mild dysplasia(JOANIE I) Squamoglandular mucosa. Chronic cervicitis. Curettings, Endocervix: unremarkable columnar cell epithelium   • OTHER SURGICAL HISTORY  2016    Biopsy, Each Additional Lesion 00368 (1) - LONNIE Maldonado   • OTHER SURGICAL HISTORY  2010    Remove Impacted Cerumen 35401 (1) - DIONISIO Eaton   • PAP SMEAR  10/15/2007    (1) - Epithelial cell abnormality: squamous cells, Atypical squamouscells of undetermined significance. cannot exclude HSIL/(ASC-H)    • SKIN BIOPSY  2016    Biopsy of Skin 72518 (1) - LONNIE Maldonado   • THUMB CARPOMETACARPAL JOINT ARTHROPLASTY FLEXOR CARPI RADIALIS TENDON Right 2017     "Procedure: THUMB CARPOMETACARPAL JOINT ARTHROPLASTY FLEXOR CARPI RADIALIS TENDON RELEASE  EXCISION OF GANGLLION AND RELEASE OF A1 PULLEY WRIST EXTENSOR DECREASED VEINS DISEASE;  Surgeon: Kolton Emery MD;  Location: [facility];  Service:      Family History   Problem Relation Age of Onset   • Diabetes Father    • Prostate cancer Father    • Colon cancer Father      OB History      Para Term  AB Living    1 1 1     1    SAB TAB Ectopic Molar Multiple Live Births              1        Prior to Admission medications    Medication Sig Start Date End Date Taking? Authorizing Provider   diclofenac (VOLTAREN) 50 MG EC tablet Take 1 tablet by mouth 2 (Two) Times a Day. 9/10/18  Yes Mar Rodriges APRN   dicyclomine (BENTYL) 10 MG capsule Take 1 capsule by mouth 4 (Four) Times a Day Before Meals & at Bedtime. 19  Yes Mary Alfaro APRN   omeprazole (priLOSEC) 40 MG capsule TAKE 1 CAPSULE DAILY 19  Yes Nicholas Maldonado MD     Allergies   Allergen Reactions   • Adhesive Tape Other (See Comments)     blisters   • Codeine      UNKNOWN REACTION   • Demerol [Meperidine] Mental Status Change   • Vicodin [Hydrocodone-Acetaminophen] Other (See Comments)     \"non functional\"     Social History     Socioeconomic History   • Marital status:      Spouse name: Not on file   • Number of children: Not on file   • Years of education: Not on file   • Highest education level: Not on file   Tobacco Use   • Smoking status: Current Every Day Smoker     Packs/day: 0.75     Years: 25.00     Pack years: 18.75     Types: Cigarettes     Start date: 1990   • Smokeless tobacco: Never Used   • Tobacco comment: Current Smoker -    Substance and Sexual Activity   • Alcohol use: No   • Drug use: No   • Sexual activity: Yes     Partners: Male     Birth control/protection: Surgical     Comment: Vasectomy       Review of Systems  Review of Systems   Constitutional: Positive for activity change, appetite change and " "fatigue. Negative for chills, diaphoresis, fever and unexpected weight change.   HENT: Negative for sore throat and trouble swallowing.    Respiratory: Negative for shortness of breath.    Gastrointestinal: Positive for abdominal pain, constipation, diarrhea and nausea. Negative for abdominal distention, anal bleeding, blood in stool, rectal pain and vomiting.   Musculoskeletal: Negative for arthralgias.   Skin: Negative for pallor.   Neurological: Negative for light-headedness.        Pulse 112   Temp 97.3 °F (36.3 °C)   Resp 16   Ht 162.6 cm (64\")   Wt 92.4 kg (203 lb 11.3 oz)   LMP 08/25/2019   SpO2 98%   BMI 34.97 kg/m²     Objective    Physical Exam   Constitutional: She is oriented to person, place, and time. She appears well-developed and well-nourished. She is cooperative. No distress.   HENT:   Head: Normocephalic and atraumatic.   Neck: Normal range of motion. Neck supple. No thyromegaly present.   Cardiovascular: Normal rate, regular rhythm and normal heart sounds.   Pulmonary/Chest: Effort normal and breath sounds normal. She has no wheezes. She has no rhonchi. She has no rales.   Abdominal: Soft. Normal appearance and bowel sounds are normal. She exhibits no distension. There is no hepatosplenomegaly. There is generalized tenderness (most tender RLQ ). There is no rigidity and no guarding. No hernia.   Lymphadenopathy:     She has no cervical adenopathy.   Neurological: She is alert and oriented to person, place, and time.   Skin: Skin is warm, dry and intact. No rash noted. No pallor.   Psychiatric: She has a normal mood and affect. Her speech is normal.     Office Visit on 05/31/2018   Component Date Value Ref Range Status   • Case Report 05/31/2018    Final                    Value:Gynecologic Cytology Report                       Case: [id number]                                  Authorizing Provider:  PEDRO Puentes        Collected:           05/31/2018 03:07 PM          Ordering " Location:     [facility]     Received:            06/01/2018 08:35 AM                                 [facility]                                                                 First Screen:          Ysabel Dickson                                                           Specimen:    Liquid-Based Pap, Screening, Cervix                                                       • Interpretation 05/31/2018 Negative for intraepithelial lesion or malignancy    Final   • Other Findings 05/31/2018 Shift in bebo suggestive of bacterial vaginosis  Mild inflammation   Final   • Specimen Adequacy 05/31/2018 Satisfactory for evaluation, endocervical/transformation zone component present   Final   • Additional Information 05/31/2018    Final                    Value:This result contains rich text formatting which cannot be displayed here.   • HPV Aptima 05/31/2018 Negative  Negative Final    This test detects fourteen high-risk HPV types (16/18/31/33/35/39/45/  51/52/56/58/59/66/68) without differentiation.     Assessment/Plan      1. Generalized abdominal pain    2. Change in bowel habits    .       Orders placed during this encounter include:  Orders Placed This Encounter   Procedures   • Pregnancy, Urine -     If child bearing age and not had hysterectomy or tubal ligation, may obtain serum if unable to void     Standing Status:   Standing     Number of Occurrences:   1   • Obtain Informed Consent     Standing Status:   Standing     Number of Occurrences:   1     Order Specific Question:   Informed Consent Given For     Answer:   ESOPHAGOGASTRODUODENOSCOPY and Colonoscopy   • POC Glucose Once     Prior to Procedure on ALL Diabetic Patients     Standing Status:   Standing     Number of Occurrences:   1   • Insert Peripheral IV     Standing Status:   Standing     Number of Occurrences:   1       ESOPHAGOGASTRODUODENOSCOPY (N/A), COLONOSCOPY (N/A)    Review and/or summary of lab tests, radiology, procedures,  medications. Review and summary of old records and obtaining of history. The risks and benefits of my recommendations, as well as other treatment options were discussed with the patient today. Questions were answered.    New Medications Ordered This Visit   Medications   • dextrose 5 % and sodium chloride 0.45 % infusion       Follow-up: No Follow-up on file.          This document has been electronically signed by Myles Ferraro MD on September 18, 2019 1:34 PM             Results for orders placed or performed in visit on 08/20/19   Pregnancy, Urine - Urine, Clean Catch   Result Value Ref Range    HCG, Urine QL Negative Negative   Results for orders placed or performed in visit on 05/31/18   HPV DNA Probe, Direct - ThinPrep Vial, Vagina   Result Value Ref Range    HPV Aptima Negative Negative   Liquid-based Pap Smear, Screening   Result Value Ref Range    Case Report       Gynecologic Cytology Report                       Case: [id number]                                  Authorizing Provider:  PEDRO Puentes        Collected:           05/31/2018 03:07 PM          Ordering Location:     [facility]     Received:            06/01/2018 08:35 AM                                 [facility]                                                                 First Screen:          Ysabel Dickson                                                           Specimen:    Liquid-Based Pap, Screening, Cervix                                                        Interpretation Negative for intraepithelial lesion or malignancy      Other Findings       Shift in bebo suggestive of bacterial vaginosis  Mild inflammation    Specimen Adequacy       Satisfactory for evaluation, endocervical/transformation zone component present    Additional Information       Disclaimer: Cervical cytology is a screening test primarily for squamous cancer and its precursors and has associated false-negative and false-positive  results.  Technologies such as liquid-based preparations may decrease but will not eliminate all false-negative results.  Follow-up of unexplained clinical signs and symptoms is recommended to minimize false-negative results. (The [place] System for Reporting Cervical Cytology: [place], 2015).      Embedded Images     Results for orders placed or performed in visit on 03/29/18   CBC (No Diff)   Result Value Ref Range    WBC 12.87 (H) 3.20 - 9.80 10*3/mm3    RBC 4.80 3.77 - 5.16 10*6/mm3    Hemoglobin 12.5 12.0 - 15.5 g/dL    Hematocrit 38.5 35.0 - 45.0 %    MCV 80.2 80.0 - 98.0 fL    MCH 26.0 (L) 26.5 - 34.0 pg    MCHC 32.5 31.4 - 36.0 g/dL    RDW 15.1 (H) 11.5 - 14.5 %    RDW-SD 44.2 36.4 - 46.3 fl    MPV 10.1 8.0 - 12.0 fL    Platelets 388 150 - 450 10*3/mm3   Lipid Panel   Result Value Ref Range    Total Cholesterol 138 0 - 199 mg/dL    Triglycerides 347 (H) 20 - 199 mg/dL    HDL Cholesterol 35 (L) 60 - 200 mg/dL    LDL Cholesterol  71 1 - 129 mg/dL    LDL/HDL Ratio 0.96 0.00 - 3.22   Comprehensive Metabolic Panel   Result Value Ref Range    Glucose 100 60 - 100 mg/dL    BUN 10 7 - 21 mg/dL    Creatinine 0.81 0.50 - 1.00 mg/dL    Sodium 142 137 - 145 mmol/L    Potassium 4.6 3.5 - 5.1 mmol/L    Chloride 106 95 - 110 mmol/L    CO2 23.0 22.0 - 31.0 mmol/L    Calcium 8.8 8.4 - 10.2 mg/dL    Total Protein 6.5 6.3 - 8.6 g/dL    Albumin 3.50 3.40 - 4.80 g/dL    ALT (SGPT) 50 9 - 52 U/L    AST (SGOT) 29 14 - 36 U/L    Alkaline Phosphatase 86 38 - 126 U/L    Total Bilirubin <0.1 (L) 0.2 - 1.3 mg/dL    eGFR Non  Amer 78 58 - 135 mL/min/1.73    Globulin 3.0 2.3 - 3.5 gm/dL    A/G Ratio 1.2 1.1 - 1.8 g/dL    BUN/Creatinine Ratio 12.3 7.0 - 25.0    Anion Gap 13.0 5.0 - 15.0 mmol/L   Results for orders placed or performed during the hospital encounter of 04/13/17   Tissue Exam   Result Value Ref Range    Case Report       Surgical Pathology Report                         Case: [id number]                                 "  Authorizing Provider:  Kolton Emery MD         Collected:           04/13/2017 09:20 AM          Ordering Location:     [facility]             Received:            04/13/2017 10:33 AM                                 [facility]                                                              Pathologist:           Adrian Song MD                                                          Specimen:    Wrist, Right, bone right wrist                                                             Final Diagnosis       BONE, RIGHT WRIST:   FRAGMENTS OF BONE NEGATIVE FOR OSTEOMYELITIS.       Gross Description       The container is labeled \"bone, right wrist\" and has fragments of bone in relation to bits of yellowish tissue.  The largest fragment measures 2.0 x 0.9 x 0.6 cm.  The next largest fragment measures 1.0 x 1.1 x 0.4 cm.  The remaining fragments measure 1.0 cc in aggregate.  The entire specimen is decalcified and representative sections are embedded.        Embedded Images     Results for orders placed or performed in visit on 04/12/17   Pregnancy, Urine   Result Value Ref Range    HCG, Urine QL Negative Negative   Results for orders placed or performed in visit on 01/24/17   Rheumatoid factor   Result Value Ref Range    Rheumatoid Factor Qualitative Negative Negative   Results for orders placed or performed in visit on 01/23/17   JES w/Reflex   Result Value Ref Range    JES Direct Positive (A) Negative   JES Comprehensive Panel   Result Value Ref Range    Anti-DNA (DS) Ab Qn 1 0 - 9 IU/mL    RNP Antibodies <0.2 0.0 - 0.9 AI    Leal Antibodies <0.2 0.0 - 0.9 AI    Antiscleroderma-70 Antibodies <0.2 0.0 - 0.9 AI    MICHAEL SSA (RO) Ab <0.2 0.0 - 0.9 AI    MICHAEL SSB (LA) Ab <0.2 0.0 - 0.9 AI    Antichromatin Antibodies <0.2 0.0 - 0.9 AI    ILSA-1 IgG <0.2 0.0 - 0.9 AI    Anti-Centromere B Antibodies <0.2 0.0 - 0.9 AI    See below: Comment    Cyclic citrul peptide antibody, IgG/IgA   Result Value Ref Range    CCP " Antibodies IgG/IgA 6 0 - 19 units   Sedimentation rate   Result Value Ref Range    Sed Rate 18 0 - 20 mm/hr   C-reactive protein   Result Value Ref Range    C-Reactive Protein 1.70 (H) 0.00 - 1.00 mg/dL   CK   Result Value Ref Range    Creatine Kinase 52 30 - 135 U/L   Results for orders placed or performed during the hospital encounter of 01/17/17   Ferritin   Result Value Ref Range    Ferritin 7.4 6.2 - 137.0 ng/ml   Results for orders placed or performed in visit on 01/17/17   Vitamin B1, Whole Blood   Result Value Ref Range    Vitamin B1, Whole Blood 201.4 (H) 66.5 - 200.0 nmol/L   TSH   Result Value Ref Range    TSH 3.81 0.46 - 4.68 uIU/ml   Vitamin B6   Result Value Ref Range    Vitamin B6 4.4 2.0 - 32.8 ug/L   Folate   Result Value Ref Range    Folate 4.04 ng/ml   Vitamin B12   Result Value Ref Range    Vitamin B-12 246 239 - 931 pg/ml   Results for orders placed or performed during the hospital encounter of 06/15/16   Converted Surgical Pathology   Result Value Ref Range    Spec Descr 1 SPECIMEN(S): A LESION, LEFT BACK     Specimen description 2 SPECIMEN(S): B LESION, MIDDLE BACK     Specimen description 3 SPECIMEN(S): C LESION, RIGHT BACK    Results for orders placed or performed in visit on 03/16/12   Converted (Historical) Gyn Cytology   Result Value Ref Range    Spec Descr 1 SPECIMEN(S): Cervical/Endocervical     Clinical Information       CLINICAL HISTORY: Reason for Pap Smear: Routine Smear, LMP: 3 wks ago    Specimen Adequacy         SPECIMEN ADEQUACY:  Satisfactory for evaluation:  endocervical/transformation zone component  present.      Microorganisms         MICROORGANISMS:  Shift in bebo suggestive of bacterial vaginosis.      Final Diagnosis         INTERPRETATION:  Negative for intraepithelial lesion or malignancy.      CONVERTED (HISTORICAL) FINAL PATHOLOGIST       Diagnostician:  FRANCA PAUL(ASCP)  Cytotechnologist  Electronically Signed 03/19/2012      ICD9 Diagnosis Code 1 ICD-9:  "V72.31     DISCLAIMER       The Pap smear is a screening test that aids in the detection of cervical  cancer and cancer precursors.  Both false positive and false negative  results can occur.  The test should be used at regular intervals, and positive results  should be confirmed before definitive therapy.     Results for orders placed or performed in visit on 11/01/07   Converted Surgical Pathology   Result Value Ref Range    Spec Descr 1 SPECIMEN(S): A CERVICAL BIOPSY @ 6:00     Specimen description 2 SPECIMEN(S): B ENDOCERVICAL CURRETTINGS     Clinical Information   CLINICAL HISTORY:  ASCUS, cannot R/O HGSIL       Gross Description         GROSS DESCRIPTION:  The first container is labeled \"uterine cervix\" and has a nodular  fragment of white soft tissue measuring 0.4 cm in greatest diameter.  It  is entirely embedded as A.  The second container has dark red mucoid material measuring 1.0 cc in  aggregate.  The entire specimen is embedded as B.      Final Diagnosis         FINAL DIAGNOSIS:  A.  UTERINE CERVIX AT 6 O'CLOCK:            MILD DYSPLASIA (JOANIE I), SQUAMOGLANDULAR MUCOSA.            CHRONIC CERVICITIS.  B.  CURETTINGS, ENDOCERVIX:            UNREMARKABLE COLUMNAR CELL EPITHELIUM.    DIAGNOSIS CODE:  4      Comment   CLINICAL DIAGNOSIS:  JOANIE I-II       CONVERTED (HISTORICAL) FINAL PATHOLOGIST       Diagnostician:  ANGELITO DOE M.D.  Pathologist  Electronically Signed 11/05/2007     Results for orders placed or performed in visit on 10/15/07   Converted (Historical) Gyn Cytology   Result Value Ref Range    Spec Descr 1 SPECIMEN(S): Cervical/Endocervical     Clinical Information       CLINICAL HISTORY: Reason for Pap Smear: Routine Smear, LMP: No LMP  Provided by Clinician      Comment         SPECIMEN ADEQUACY:  Satisfactory for evaluation:  endocervical/transformation zone component  present.    MICROORGANISMS:  Shift in bebo suggestive of bacterial vaginosis.    INTERPRETATION:  Epithelial cell " abnormality:  squamous cells, Atypical squamous cells of  undetermined significance cannot exclude HSIL/High Grade CATRACHITA (ASC-H).      CONVERTED (HISTORICAL) FINAL PATHOLOGIST       Diagnostician:  FRANCA PAUL(ASCP)  Cytotechnologist      Final Pathologist/Cyto tech 3       Diagnostician:  MARICRUZ BLAKELY M.D.  Pathologist  Electronically Signed 10/18/2007      ICD9 Diagnosis Code 1 ICD-9: V72.31     ICD9 Diagnosis Code 2 ICD-9: 795.01

## 2019-09-19 ENCOUNTER — HOSPITAL ENCOUNTER (OUTPATIENT)
Facility: HOSPITAL | Age: 44
Setting detail: OBSERVATION
Discharge: HOME | End: 2019-09-20
Attending: EMERGENCY MEDICINE | Admitting: INTERNAL MEDICINE

## 2019-09-19 ENCOUNTER — APPOINTMENT (OUTPATIENT)
Dept: CT IMAGING | Facility: HOSPITAL | Age: 44
End: 2019-09-19

## 2019-09-19 ENCOUNTER — APPOINTMENT (OUTPATIENT)
Dept: GENERAL RADIOLOGY | Facility: HOSPITAL | Age: 44
End: 2019-09-19

## 2019-09-19 DIAGNOSIS — E86.0 DEHYDRATION, MODERATE: ICD-10-CM

## 2019-09-19 DIAGNOSIS — K63.9 BOWEL WALL THICKENING: Primary | ICD-10-CM

## 2019-09-19 DIAGNOSIS — I88.0 NONSPECIFIC MESENTERIC ADENITIS: ICD-10-CM

## 2019-09-19 DIAGNOSIS — N17.9 AKI (ACUTE KIDNEY INJURY) (HCC): ICD-10-CM

## 2019-09-19 DIAGNOSIS — R11.2 NAUSEA VOMITING AND DIARRHEA: ICD-10-CM

## 2019-09-19 DIAGNOSIS — R19.7 NAUSEA VOMITING AND DIARRHEA: ICD-10-CM

## 2019-09-19 LAB
ALBUMIN SERPL-MCNC: 4.2 G/DL (ref 3.5–5.2)
ALBUMIN/GLOB SERPL: 1.2 G/DL
ALP SERPL-CCNC: 77 U/L (ref 39–117)
ALT SERPL W P-5'-P-CCNC: 11 U/L (ref 1–33)
ANION GAP SERPL CALCULATED.3IONS-SCNC: 17 MMOL/L (ref 5–15)
AST SERPL-CCNC: 12 U/L (ref 1–32)
BACTERIA UR QL AUTO: ABNORMAL /HPF
BASOPHILS # BLD AUTO: 0.05 10*3/MM3 (ref 0–0.2)
BASOPHILS NFR BLD AUTO: 0.2 % (ref 0–1.5)
BILIRUB SERPL-MCNC: 0.4 MG/DL (ref 0.2–1.2)
BILIRUB UR QL STRIP: ABNORMAL
BUN BLD-MCNC: 16 MG/DL (ref 6–20)
BUN/CREAT SERPL: 13.8 (ref 7–25)
CALCIUM SPEC-SCNC: 9.5 MG/DL (ref 8.6–10.5)
CHLORIDE SERPL-SCNC: 98 MMOL/L (ref 98–107)
CK SERPL-CCNC: 142 U/L (ref 20–180)
CLARITY UR: ABNORMAL
CO2 SERPL-SCNC: 20 MMOL/L (ref 22–29)
COLOR UR: ABNORMAL
CREAT BLD-MCNC: 1.16 MG/DL (ref 0.57–1)
DEPRECATED RDW RBC AUTO: 43.4 FL (ref 37–54)
EOSINOPHIL # BLD AUTO: 0.12 10*3/MM3 (ref 0–0.4)
EOSINOPHIL NFR BLD AUTO: 0.5 % (ref 0.3–6.2)
ERYTHROCYTE [DISTWIDTH] IN BLOOD BY AUTOMATED COUNT: 15.7 % (ref 12.3–15.4)
GFR SERPL CREATININE-BSD FRML MDRD: 51 ML/MIN/1.73
GLOBULIN UR ELPH-MCNC: 3.6 GM/DL
GLUCOSE BLD-MCNC: 126 MG/DL (ref 65–99)
GLUCOSE UR STRIP-MCNC: NEGATIVE MG/DL
GRAN CASTS URNS QL MICRO: ABNORMAL /LPF
HCG SERPL QL: NEGATIVE
HCT VFR BLD AUTO: 42.1 % (ref 34–46.6)
HGB BLD-MCNC: 14 G/DL (ref 12–15.9)
HGB UR QL STRIP.AUTO: ABNORMAL
HOLD SPECIMEN: NORMAL
HYALINE CASTS UR QL AUTO: ABNORMAL /LPF
IMM GRANULOCYTES # BLD AUTO: 0.11 10*3/MM3 (ref 0–0.05)
IMM GRANULOCYTES NFR BLD AUTO: 0.5 % (ref 0–0.5)
KETONES UR QL STRIP: ABNORMAL
LEUKOCYTE ESTERASE UR QL STRIP.AUTO: NEGATIVE
LIPASE SERPL-CCNC: 33 U/L (ref 13–60)
LYMPHOCYTES # BLD AUTO: 2.53 10*3/MM3 (ref 0.7–3.1)
LYMPHOCYTES NFR BLD AUTO: 11.5 % (ref 19.6–45.3)
MCH RBC QN AUTO: 25.8 PG (ref 26.6–33)
MCHC RBC AUTO-ENTMCNC: 33.3 G/DL (ref 31.5–35.7)
MCV RBC AUTO: 77.5 FL (ref 79–97)
MONOCYTES # BLD AUTO: 1.92 10*3/MM3 (ref 0.1–0.9)
MONOCYTES NFR BLD AUTO: 8.8 % (ref 5–12)
MUCOUS THREADS URNS QL MICRO: ABNORMAL /HPF
NEUTROPHILS # BLD AUTO: 17.19 10*3/MM3 (ref 1.7–7)
NEUTROPHILS NFR BLD AUTO: 78.5 % (ref 42.7–76)
NITRITE UR QL STRIP: NEGATIVE
NRBC BLD AUTO-RTO: 0 /100 WBC (ref 0–0.2)
PH UR STRIP.AUTO: <=5 [PH] (ref 5–9)
PLATELET # BLD AUTO: 499 10*3/MM3 (ref 140–450)
PMV BLD AUTO: 9.4 FL (ref 6–12)
POTASSIUM BLD-SCNC: 3.4 MMOL/L (ref 3.5–5.2)
PROT SERPL-MCNC: 7.8 G/DL (ref 6–8.5)
PROT UR QL STRIP: ABNORMAL
RBC # BLD AUTO: 5.43 10*6/MM3 (ref 3.77–5.28)
RBC # UR: ABNORMAL /HPF
REF LAB TEST METHOD: ABNORMAL
SODIUM BLD-SCNC: 135 MMOL/L (ref 136–145)
SP GR UR STRIP: 1.04 (ref 1–1.03)
SQUAMOUS #/AREA URNS HPF: ABNORMAL /HPF
UROBILINOGEN UR QL STRIP: ABNORMAL
WBC NRBC COR # BLD: 21.92 10*3/MM3 (ref 3.4–10.8)
WBC UR QL AUTO: ABNORMAL /HPF
WHOLE BLOOD HOLD SPECIMEN: NORMAL
WHOLE BLOOD HOLD SPECIMEN: NORMAL
YEAST URNS QL MICRO: ABNORMAL /HPF

## 2019-09-19 PROCEDURE — 96375 TX/PRO/DX INJ NEW DRUG ADDON: CPT

## 2019-09-19 PROCEDURE — G0378 HOSPITAL OBSERVATION PER HR: HCPCS

## 2019-09-19 PROCEDURE — 84703 CHORIONIC GONADOTROPIN ASSAY: CPT | Performed by: EMERGENCY MEDICINE

## 2019-09-19 PROCEDURE — 80053 COMPREHEN METABOLIC PANEL: CPT | Performed by: EMERGENCY MEDICINE

## 2019-09-19 PROCEDURE — 25010000002 PIPERACILLIN SOD-TAZOBACTAM PER 1 G: Performed by: INTERNAL MEDICINE

## 2019-09-19 PROCEDURE — 85025 COMPLETE CBC W/AUTO DIFF WBC: CPT | Performed by: EMERGENCY MEDICINE

## 2019-09-19 PROCEDURE — 94760 N-INVAS EAR/PLS OXIMETRY 1: CPT

## 2019-09-19 PROCEDURE — 74176 CT ABD & PELVIS W/O CONTRAST: CPT

## 2019-09-19 PROCEDURE — 25010000002 ONDANSETRON PER 1 MG: Performed by: EMERGENCY MEDICINE

## 2019-09-19 PROCEDURE — 83690 ASSAY OF LIPASE: CPT | Performed by: EMERGENCY MEDICINE

## 2019-09-19 PROCEDURE — 99285 EMERGENCY DEPT VISIT HI MDM: CPT

## 2019-09-19 PROCEDURE — 94799 UNLISTED PULMONARY SVC/PX: CPT

## 2019-09-19 PROCEDURE — 96366 THER/PROPH/DIAG IV INF ADDON: CPT

## 2019-09-19 PROCEDURE — 96365 THER/PROPH/DIAG IV INF INIT: CPT

## 2019-09-19 PROCEDURE — 25010000002 KETOROLAC TROMETHAMINE PER 15 MG: Performed by: EMERGENCY MEDICINE

## 2019-09-19 PROCEDURE — 74019 RADEX ABDOMEN 2 VIEWS: CPT

## 2019-09-19 PROCEDURE — 25010000002 PIPERACILLIN SOD-TAZOBACTAM PER 1 G: Performed by: EMERGENCY MEDICINE

## 2019-09-19 PROCEDURE — 82550 ASSAY OF CK (CPK): CPT | Performed by: EMERGENCY MEDICINE

## 2019-09-19 PROCEDURE — 81001 URINALYSIS AUTO W/SCOPE: CPT | Performed by: EMERGENCY MEDICINE

## 2019-09-19 PROCEDURE — 96361 HYDRATE IV INFUSION ADD-ON: CPT

## 2019-09-19 RX ORDER — ALUMINA, MAGNESIA, AND SIMETHICONE 2400; 2400; 240 MG/30ML; MG/30ML; MG/30ML
15 SUSPENSION ORAL ONCE
Status: COMPLETED | OUTPATIENT
Start: 2019-09-19 | End: 2019-09-19

## 2019-09-19 RX ORDER — ONDANSETRON 2 MG/ML
4 INJECTION INTRAMUSCULAR; INTRAVENOUS EVERY 6 HOURS PRN
Status: DISCONTINUED | OUTPATIENT
Start: 2019-09-19 | End: 2019-09-20 | Stop reason: HOSPADM

## 2019-09-19 RX ORDER — SODIUM CHLORIDE 0.9 % (FLUSH) 0.9 %
10 SYRINGE (ML) INJECTION AS NEEDED
Status: DISCONTINUED | OUTPATIENT
Start: 2019-09-19 | End: 2019-09-19 | Stop reason: SDUPTHER

## 2019-09-19 RX ORDER — DEXTROSE AND SODIUM CHLORIDE 5; .45 G/100ML; G/100ML
1000 INJECTION, SOLUTION INTRAVENOUS CONTINUOUS
Status: DISCONTINUED | OUTPATIENT
Start: 2019-09-19 | End: 2019-09-20

## 2019-09-19 RX ORDER — LIDOCAINE HYDROCHLORIDE 20 MG/ML
15 SOLUTION OROPHARYNGEAL ONCE
Status: COMPLETED | OUTPATIENT
Start: 2019-09-19 | End: 2019-09-19

## 2019-09-19 RX ORDER — KETOROLAC TROMETHAMINE 30 MG/ML
30 INJECTION, SOLUTION INTRAMUSCULAR; INTRAVENOUS ONCE
Status: COMPLETED | OUTPATIENT
Start: 2019-09-19 | End: 2019-09-19

## 2019-09-19 RX ORDER — SODIUM CHLORIDE 9 MG/ML
100 INJECTION, SOLUTION INTRAVENOUS CONTINUOUS
Status: DISCONTINUED | OUTPATIENT
Start: 2019-09-19 | End: 2019-09-20 | Stop reason: HOSPADM

## 2019-09-19 RX ORDER — SODIUM CHLORIDE 0.9 % (FLUSH) 0.9 %
10 SYRINGE (ML) INJECTION EVERY 12 HOURS SCHEDULED
Status: DISCONTINUED | OUTPATIENT
Start: 2019-09-19 | End: 2019-09-20 | Stop reason: HOSPADM

## 2019-09-19 RX ORDER — SODIUM CHLORIDE 0.9 % (FLUSH) 0.9 %
10 SYRINGE (ML) INJECTION AS NEEDED
Status: DISCONTINUED | OUTPATIENT
Start: 2019-09-19 | End: 2019-09-20 | Stop reason: HOSPADM

## 2019-09-19 RX ORDER — ONDANSETRON 2 MG/ML
4 INJECTION INTRAMUSCULAR; INTRAVENOUS ONCE
Status: COMPLETED | OUTPATIENT
Start: 2019-09-19 | End: 2019-09-19

## 2019-09-19 RX ORDER — ACETAMINOPHEN 325 MG/1
650 TABLET ORAL EVERY 6 HOURS PRN
Status: DISCONTINUED | OUTPATIENT
Start: 2019-09-19 | End: 2019-09-20 | Stop reason: HOSPADM

## 2019-09-19 RX ADMIN — PIPERACILLIN AND TAZOBACTAM 3.38 G: 3; .375 INJECTION, POWDER, FOR SOLUTION INTRAVENOUS at 23:57

## 2019-09-19 RX ADMIN — LIDOCAINE HYDROCHLORIDE 15 ML: 20 SOLUTION ORAL; TOPICAL at 22:10

## 2019-09-19 RX ADMIN — SODIUM CHLORIDE 8 MG/HR: 900 INJECTION INTRAVENOUS at 21:21

## 2019-09-19 RX ADMIN — SODIUM CHLORIDE 100 ML/HR: 9 INJECTION, SOLUTION INTRAVENOUS at 19:49

## 2019-09-19 RX ADMIN — KETOROLAC TROMETHAMINE 30 MG: 30 INJECTION, SOLUTION INTRAMUSCULAR at 15:08

## 2019-09-19 RX ADMIN — ALUMINUM HYDROXIDE, MAGNESIUM HYDROXIDE, AND DIMETHICONE 15 ML: 400; 400; 40 SUSPENSION ORAL at 22:21

## 2019-09-19 RX ADMIN — ONDANSETRON 4 MG: 2 INJECTION INTRAMUSCULAR; INTRAVENOUS at 15:07

## 2019-09-19 RX ADMIN — DEXTROSE AND SODIUM CHLORIDE 1000 ML/HR: 5; 450 INJECTION, SOLUTION INTRAVENOUS at 15:00

## 2019-09-19 RX ADMIN — SODIUM CHLORIDE, PRESERVATIVE FREE 10 ML: 5 INJECTION INTRAVENOUS at 22:22

## 2019-09-19 RX ADMIN — PIPERACILLIN AND TAZOBACTAM 3.38 G: 3; .375 INJECTION, POWDER, LYOPHILIZED, FOR SOLUTION INTRAVENOUS; PARENTERAL at 17:22

## 2019-09-19 NOTE — ED NOTES
Pt reports she always has problems with the medications after being put to sleep, becoming very nauseated. Pt reports after she vomits she usually feels better. Pt reports no solid foods since Tuesday due to the procedure. Pt reports N/V/D since last night at 1800. Pt states she's had crackers, part of a milkshake, and some crackers last night. Pt has had charley horses as well. Dr Ferraro did the procedure.      Catalina Minaya RN  09/19/19 [id number]

## 2019-09-19 NOTE — ED NOTES
"Patient was sent from urgent care to be seen in the ER. Patients paper from  that was given to this tech has wrote on it \"N/V/D/ dehydration/ tachycardia/post-procedure colonoscopy/acute muscle spasms.\"      Tin Huerta  09/19/19 [id number]    "

## 2019-09-19 NOTE — H&P
[facility] Medicine Admission      Date of Admission: 2019      Primary Care Physician: Nicholas Maldonado MD      Chief Complaint: n,v,d    HPI:  44-year-old female with past medical hx of gerd comes to the hospital from local urgent care due to nausea vomiting and diarrhea that started approximately 24 hours ago. Of note, pt recently had egd and colonosocpy done by dr. woodward yesterday. Pt had the procedure because pt has been having    abdominal pain and diarrhea.  States symptoms have been ongoing for 13 years wax and wane over the years.  She was scheduled for colonoscopy around the time of onset 13 years ago but unable to do before procedure and has not had it done.  She was evaluated recently 2019 for primary care provider given Bentyl states it caused severe bloating and had flat x-ray which showed no acute abnormality.  She has tried Colestid without relief in symptoms       10 point ros is negative except for aforementioned findings    Concurrent Medical History:  has a past medical history of Abdominal pain, Allergy, Conjunctivitis, Encounter for routine gynecological examination, Gastroesophageal reflux disease, Irritable bowel syndrome with diarrhea, Pigmented skin lesion, PONV (postoperative nausea and vomiting), Screening examination for venereal disease, and Viral gastroenteritis.    Past Surgical History:  has a past surgical history that includes Cholecystectomy (); Pap Smear (10/15/2007); Other surgical history; Other surgical history (2007); Skin biopsy (2016); Other surgical history (2016);  section (2007); Injection of Medication (2015); Other surgical history (2010); Thumb Carpometacarpal Joint Arthroplasty Flexor Carpi Radialis Tendon (Right, 2017); Esophagogastroduodenoscopy (N/A, 2019); and Colonoscopy (N/A, 2019).    Family History: family history includes Colon cancer in her  "father; Diabetes in her father; Prostate cancer in her father.     Social History:  reports that she has been smoking cigarettes.  She started smoking about 29 years ago. She has a 18.75 pack-year smoking history. She has never used smokeless tobacco. She reports that she does not drink alcohol or use drugs.    Allergies:   Allergies   Allergen Reactions   • Adhesive Tape Other (See Comments)     blisters   • Codeine      UNKNOWN REACTION   • Demerol [Meperidine] Mental Status Change   • Vicodin [Hydrocodone-Acetaminophen] Other (See Comments)     \"non functional\"       Medications:   Prior to Admission medications    Medication Sig Start Date End Date Taking? Authorizing Provider   diclofenac (VOLTAREN) 50 MG EC tablet Take 1 tablet by mouth 2 (Two) Times a Day. 9/10/18  Yes Mar Rodriges APRN   omeprazole (priLOSEC) 40 MG capsule Take 1 capsule by mouth Daily. 9/5/19  Yes Nicholas Maldonado MD       Review of Systems:  Review of Systems   All other systems reviewed and are negative.     Otherwise complete ROS is negative except as mentioned above.    Physical Exam:   Temp:  [98.1 °F (36.7 °C)-98.6 °F (37 °C)] 98.1 °F (36.7 °C)  Heart Rate:  [] 80  Resp:  [18-20] 18  BP: (110-138)/(72-90) 110/73  Physical Exam  Constitutional: She is oriented to person, place, and time. She appears well-developed and well-nourished. She is active and cooperative. She has a sickly appearance.   HENT:   Mouth/Throat: Mucous membranes are dry.   Eyes: Pupils are equal, round, and reactive to light.   Cardiovascular: Regular rhythm and normal heart sounds. Tachycardia present.   Tachycardia secondary to dehydration.    Pulmonary/Chest: Effort normal and breath sounds normal.   Abdominal: Soft. Normal appearance. Bowel sounds are increased. There is generalized tenderness.   Neurological: She is alert and oriented to person, place, and time.   Skin: Skin is warm and dry.   Psychiatric: She has a normal mood and affect. Her speech " is normal and behavior is normal.   Nursing note and vitals reviewed.    Results Reviewed:  I have personally reviewed current lab, radiology, and data and agree with results.  Lab Results (last 24 hours)     Procedure Component Value Units Date/Time    Urinalysis, Microscopic Only - Urine, Clean Catch [[id number]]  (Abnormal) Collected:  09/19/19 1356    Specimen:  Urine, Clean Catch Updated:  09/19/19 1702     RBC, UA 6-12 /HPF      WBC, UA 6-12 /HPF      Bacteria, UA 2+ /HPF      Squamous Epithelial Cells, UA 6-12 /HPF      Yeast, UA Small/1+ Yeast /HPF      Hyaline Casts, UA Too Numerous to Count /LPF      Granular Casts, UA 0-2 /LPF      Mucus, UA Small/1+ /HPF      Methodology Manual Light Microscopy    CK [[id number]]  (Normal) Collected:  09/19/19 1340    Specimen:  Blood Updated:  09/19/19 1458     Creatine Kinase 142 U/L     Sherrill Draw [[id number]] Collected:  09/19/19 1340    Specimen:  Blood Updated:  09/19/19 1446    Narrative:       The following orders were created for panel order Sherrill Draw.  Procedure                               Abnormality         Status                     ---------                               -----------         ------                     Light Blue Top[[id number]]                                   Final result               Green Top (Gel)[[id number]]                                  Final result               Lavender Top[[id number]]                                     Final result               Gold Top - SST[[id number]]                                                                Please view results for these tests on the individual orders.    Light Blue Top [[id number]] Collected:  09/19/19 1340    Specimen:  Blood Updated:  09/19/19 1446     Extra Tube hold for add-on     Comment: Auto resulted       Green Top (Gel) [[id number]] Collected:  09/19/19 1340    Specimen:  Blood Updated:  09/19/19 1446     Extra Tube Hold for add-ons.     Comment: Auto resulted.       Lavender  Top [[id number]] Collected:  09/19/19 1340    Specimen:  Blood Updated:  09/19/19 1446     Extra Tube hold for add-on     Comment: Auto resulted       Urinalysis With Microscopic If Indicated (No Culture) - Urine, Clean Catch [[id number]]  (Abnormal) Collected:  09/19/19 1356    Specimen:  Urine, Clean Catch Updated:  09/19/19 1415     Color, UA Dark Yellow     Appearance, UA Cloudy     pH, UA <=5.0     Specific Gravity, UA 1.037     Comment: BY REFRACTOMETER        Glucose, UA Negative     Ketones, UA 15 mg/dL (1+)     Bilirubin, UA Large (3+)     Blood, UA Small (1+)     Protein,  mg/dL (2+)     Leuk Esterase, UA Negative     Nitrite, UA Negative     Urobilinogen, UA 1.0 E.U./dL    Comprehensive Metabolic Panel [[id number]]  (Abnormal) Collected:  09/19/19 1340    Specimen:  Blood Updated:  09/19/19 1414     Glucose 126 mg/dL      BUN 16 mg/dL      Creatinine 1.16 mg/dL      Sodium 135 mmol/L      Potassium 3.4 mmol/L      Chloride 98 mmol/L      CO2 20.0 mmol/L      Calcium 9.5 mg/dL      Total Protein 7.8 g/dL      Albumin 4.20 g/dL      ALT (SGPT) 11 U/L      AST (SGOT) 12 U/L      Alkaline Phosphatase 77 U/L      Total Bilirubin 0.4 mg/dL      eGFR Non African Amer 51 mL/min/1.73      Globulin 3.6 gm/dL      A/G Ratio 1.2 g/dL      BUN/Creatinine Ratio 13.8     Anion Gap 17.0 mmol/L     Narrative:       GFR Normal >60  Chronic Kidney Disease <60  Kidney Failure <15    Lipase [[id number]]  (Normal) Collected:  09/19/19 1340    Specimen:  Blood Updated:  09/19/19 1414     Lipase 33 U/L     hCG, Serum, Qualitative [[id number]]  (Normal) Collected:  09/19/19 1340    Specimen:  Blood Updated:  09/19/19 1406     HCG Qualitative Negative    CBC & Differential [[id number]] Collected:  09/19/19 1340    Specimen:  Blood Updated:  09/19/19 1353    Narrative:       The following orders were created for panel order CBC & Differential.  Procedure                               Abnormality         Status                      ---------                               -----------         ------                     CBC Auto Differential[[id number]]        Abnormal            Final result                 Please view results for these tests on the individual orders.    CBC Auto Differential [[id number]]  (Abnormal) Collected:  09/19/19 1340    Specimen:  Blood Updated:  09/19/19 1353     WBC 21.92 10*3/mm3      RBC 5.43 10*6/mm3      Hemoglobin 14.0 g/dL      Hematocrit 42.1 %      MCV 77.5 fL      MCH 25.8 pg      MCHC 33.3 g/dL      RDW 15.7 %      RDW-SD 43.4 fl      MPV 9.4 fL      Platelets 499 10*3/mm3      Neutrophil % 78.5 %      Lymphocyte % 11.5 %      Monocyte % 8.8 %      Eosinophil % 0.5 %      Basophil % 0.2 %      Immature Grans % 0.5 %      Neutrophils, Absolute 17.19 10*3/mm3      Lymphocytes, Absolute 2.53 10*3/mm3      Monocytes, Absolute 1.92 10*3/mm3      Eosinophils, Absolute 0.12 10*3/mm3      Basophils, Absolute 0.05 10*3/mm3      Immature Grans, Absolute 0.11 10*3/mm3      nRBC 0.0 /100 WBC         Imaging Results (last 24 hours)     Procedure Component Value Units Date/Time    CT Abdomen Pelvis Without Contrast [[id number]] Collected:  09/19/19 1509     Updated:  09/19/19 1617    Narrative:       PROCEDURE: CT abdomen and pelvis without intravenous contrast    HISTORY: Abdominal pain and intractable nausea and vomiting  postprocedure. Status post EGD on 9/18/2019.    This exam was performed using radiation doses that are as low as  reasonably achievable (ALARA).  This exam was performed according to our departmental dose  optimization program, which includes automated exposure control,  adjustment of the mA and/or KV according to patient size and/or  use of iterative reconstruction technique.    COMPARISON: CT abdomen pelvis dated 8/20/2019.    CONTRAST: None    TECHNIQUE: Multiple contiguous noncontrast axial images are  obtained of the abdomen and pelvis.     FINDINGS:     LOWER CHEST: Unremarkable.    HEPATOBILIARY:  Post cholecystectomy.  SPLEEN: Unremarkable.  PANCREAS: Unremarkable  ADRENAL GLANDS: Unremarkable.  KIDNEYS/URETERS: No evidence of hydronephrosis or suspicious  mass.    GASTROINTESTINAL: Loop of ileum in the right lower quadrant which  demonstrates bowel wall thickening similar to the prior study.  The associated mesentery appears hypervascular. Loop of jejunum  in the left upper quadrant appears prominent with air-fluid  levels, possibly due to a focal ileus. There appears to be liquid  stool in the sigmoid colon. A normal appendix is visualized.  Incidental note is made of a Meckel's diverticulum arising from  the distal ileum in the right lower quadrant. The diverticulum  measures approximately 2.8 cm in length and 1.8 cm in diameter.  REPRODUCTIVE ORGANS: Unremarkable.  URINARY BLADDER: Unremarkable    VASCULAR: Unremarkable  LYMPH NODES: There is a group of slightly enlarged mesenteric  lymph nodes with the largest measuring 1.4 x 2 cm in the  midabdomen just to the right of midline, with surrounding fat  stranding in the mesentery, suspicious for mesenteric  panniculitis or less likely neoplasm such as lymphoma. Findings  are similar to the prior exam.  PERITONEUM/RETROPERITONEUM: Unremarkable.     OSSEOUS STRUCTURES: Unremarkable.        Impression:       CONCLUSION:   1.  Dilated loops of left upper quadrant small bowel with  air-fluid levels.  2.  Bowel wall thickening and hypervascular mesentery in the  right lower quadrant jejunum.  3.  Slightly enlarged mesenteric lymph nodes in the right  midabdomen with surrounding inflammation and hypervascularity.  4.  This constellation of findings suggests the possibility of  Crohn's disease.  5.  Given the lymph node enlargement and surrounding fat  stranding in the mesentery, a neoplastic process such as lymphoma  although less likely could also be considered.  6.  Incidental note is made of a Meckel's diverticulum arising  from the distal ileum in the right  lower quadrant. The  diverticulum measures approximately 2.8 cm in length and 1.8 cm  in diameter.    Electronically signed by:  Joey Ma MD  9/19/2019 4:16 PM CDT  Workstation: [id number]    XR Abdomen Flat & Upright [[id number]] Collected:  09/19/19 1448     Updated:  09/19/19 1459    Narrative:       PROCEDURE: Two-view abdomen    COMPARISON: None    HISTORY: abdominal pain    TECHNIQUE: Upright and supine views of the abdomen are obtained.     FINDINGS:  Surgical clips in the right upper quadrant.   Multiple prominent loops of bowel in the upper abdomen are  nonspecific, cannot exclude small bowel obstruction.  Calcifications in the pelvis are likely phleboliths similar to  the prior study.      Impression:       CONCLUSION:    Multiple prominent loops of bowel in the upper abdomen are  nonspecific, cannot exclude small bowel obstruction.    Electronically signed by:  Joey Ma MD  9/19/2019 2:58 PM CDT  Workstation: [id number]            Assessment:    Active Hospital Problems    Diagnosis   • Nausea & vomiting         Nausea, vomiting, diarrhea  -unknown etiology  -could be secondary to infection, gi panel pending  -dr. Tran consulted from ED and recommended to give iv zosyn for now  -consult dr. woodward in am since it is his pt  -will give gentle hydration via ivf  -zofran prn  -advance diet as tolerated        dvt ppx lovenox

## 2019-09-20 VITALS
RESPIRATION RATE: 18 BRPM | DIASTOLIC BLOOD PRESSURE: 73 MMHG | WEIGHT: 200 LBS | HEIGHT: 64 IN | BODY MASS INDEX: 34.15 KG/M2 | HEART RATE: 82 BPM | TEMPERATURE: 97.2 F | OXYGEN SATURATION: 97 % | SYSTOLIC BLOOD PRESSURE: 116 MMHG

## 2019-09-20 LAB
ADV 40+41 DNA STL QL NAA+NON-PROBE: NOT DETECTED
ANION GAP SERPL CALCULATED.3IONS-SCNC: 10 MMOL/L (ref 5–15)
ASTRO TYP 1-8 RNA STL QL NAA+NON-PROBE: NOT DETECTED
BASOPHILS # BLD AUTO: 0.06 10*3/MM3 (ref 0–0.2)
BASOPHILS NFR BLD AUTO: 0.4 % (ref 0–1.5)
BUN BLD-MCNC: 12 MG/DL (ref 6–20)
BUN/CREAT SERPL: 10.5 (ref 7–25)
C CAYETANENSIS DNA STL QL NAA+NON-PROBE: NOT DETECTED
C DIFF TOX GENS STL QL NAA+PROBE: NOT DETECTED
CALCIUM SPEC-SCNC: 8.7 MG/DL (ref 8.6–10.5)
CAMPY SP DNA.DIARRHEA STL QL NAA+PROBE: NOT DETECTED
CHLORIDE SERPL-SCNC: 99 MMOL/L (ref 98–107)
CO2 SERPL-SCNC: 25 MMOL/L (ref 22–29)
CREAT BLD-MCNC: 1.14 MG/DL (ref 0.57–1)
CRYPTOSP STL CULT: NOT DETECTED
DEPRECATED RDW RBC AUTO: 43.7 FL (ref 37–54)
E COLI DNA SPEC QL NAA+PROBE: NOT DETECTED
E HISTOLYT AG STL-ACNC: NOT DETECTED
EAEC PAA PLAS AGGR+AATA ST NAA+NON-PRB: NOT DETECTED
EC STX1 + STX2 GENES STL NAA+PROBE: NOT DETECTED
EOSINOPHIL # BLD AUTO: 0.31 10*3/MM3 (ref 0–0.4)
EOSINOPHIL NFR BLD AUTO: 2.2 % (ref 0.3–6.2)
EPEC EAE GENE STL QL NAA+NON-PROBE: NOT DETECTED
ERYTHROCYTE [DISTWIDTH] IN BLOOD BY AUTOMATED COUNT: 15.5 % (ref 12.3–15.4)
ETEC LTA+ST1A+ST1B TOX ST NAA+NON-PROBE: NOT DETECTED
G LAMBLIA DNA SPEC QL NAA+PROBE: NOT DETECTED
GFR SERPL CREATININE-BSD FRML MDRD: 52 ML/MIN/1.73
GLUCOSE BLD-MCNC: 96 MG/DL (ref 65–99)
HCT VFR BLD AUTO: 39.4 % (ref 34–46.6)
HGB BLD-MCNC: 12.7 G/DL (ref 12–15.9)
IMM GRANULOCYTES # BLD AUTO: 0.05 10*3/MM3 (ref 0–0.05)
IMM GRANULOCYTES NFR BLD AUTO: 0.4 % (ref 0–0.5)
LAB AP CASE REPORT: NORMAL
LYMPHOCYTES # BLD AUTO: 2.54 10*3/MM3 (ref 0.7–3.1)
LYMPHOCYTES NFR BLD AUTO: 17.9 % (ref 19.6–45.3)
MAGNESIUM SERPL-MCNC: 1.9 MG/DL (ref 1.6–2.6)
MCH RBC QN AUTO: 25.2 PG (ref 26.6–33)
MCHC RBC AUTO-ENTMCNC: 32.2 G/DL (ref 31.5–35.7)
MCV RBC AUTO: 78.3 FL (ref 79–97)
MONOCYTES # BLD AUTO: 1.45 10*3/MM3 (ref 0.1–0.9)
MONOCYTES NFR BLD AUTO: 10.2 % (ref 5–12)
NEUTROPHILS # BLD AUTO: 9.77 10*3/MM3 (ref 1.7–7)
NEUTROPHILS NFR BLD AUTO: 68.9 % (ref 42.7–76)
NOROVIRUS GI+II RNA STL QL NAA+NON-PROBE: NOT DETECTED
NRBC BLD AUTO-RTO: 0 /100 WBC (ref 0–0.2)
P SHIGELLOIDES DNA STL QL NAA+PROBE: NOT DETECTED
PATH REPORT.FINAL DX SPEC: NORMAL
PATH REPORT.GROSS SPEC: NORMAL
PLATELET # BLD AUTO: 406 10*3/MM3 (ref 140–450)
PMV BLD AUTO: 9.9 FL (ref 6–12)
POTASSIUM BLD-SCNC: 3.4 MMOL/L (ref 3.5–5.2)
RBC # BLD AUTO: 5.03 10*6/MM3 (ref 3.77–5.28)
RV RNA STL NAA+PROBE: NOT DETECTED
SALMONELLA DNA SPEC QL NAA+PROBE: NOT DETECTED
SAPO I+II+IV+V RNA STL QL NAA+NON-PROBE: NOT DETECTED
SHIGELLA SP+EIEC IPAH STL QL NAA+PROBE: NOT DETECTED
SODIUM BLD-SCNC: 134 MMOL/L (ref 136–145)
V CHOLERAE DNA SPEC QL NAA+PROBE: NOT DETECTED
VIBRIO DNA SPEC NAA+PROBE: NOT DETECTED
WBC NRBC COR # BLD: 14.18 10*3/MM3 (ref 3.4–10.8)
YERSINIA STL CULT: NOT DETECTED

## 2019-09-20 PROCEDURE — G0378 HOSPITAL OBSERVATION PER HR: HCPCS

## 2019-09-20 PROCEDURE — 85025 COMPLETE CBC W/AUTO DIFF WBC: CPT | Performed by: INTERNAL MEDICINE

## 2019-09-20 PROCEDURE — 0097U HC BIOFIRE FILMARRAY GI PANEL: CPT | Performed by: EMERGENCY MEDICINE

## 2019-09-20 PROCEDURE — 25010000002 PIPERACILLIN SOD-TAZOBACTAM PER 1 G: Performed by: INTERNAL MEDICINE

## 2019-09-20 PROCEDURE — 80048 BASIC METABOLIC PNL TOTAL CA: CPT | Performed by: INTERNAL MEDICINE

## 2019-09-20 PROCEDURE — 83735 ASSAY OF MAGNESIUM: CPT | Performed by: INTERNAL MEDICINE

## 2019-09-20 PROCEDURE — 25010000002 FLUCONAZOLE PER 200 MG: Performed by: INTERNAL MEDICINE

## 2019-09-20 PROCEDURE — 96375 TX/PRO/DX INJ NEW DRUG ADDON: CPT

## 2019-09-20 PROCEDURE — 99214 OFFICE O/P EST MOD 30 MIN: CPT | Performed by: INTERNAL MEDICINE

## 2019-09-20 PROCEDURE — 96366 THER/PROPH/DIAG IV INF ADDON: CPT

## 2019-09-20 RX ORDER — FLUCONAZOLE 2 MG/ML
400 INJECTION, SOLUTION INTRAVENOUS DAILY
Status: DISCONTINUED | OUTPATIENT
Start: 2019-09-20 | End: 2019-09-20 | Stop reason: HOSPADM

## 2019-09-20 RX ORDER — FLUCONAZOLE 200 MG/1
200 TABLET ORAL DAILY
Qty: 3 TABLET | Refills: 0 | Status: SHIPPED | OUTPATIENT
Start: 2019-09-20 | End: 2019-09-23

## 2019-09-20 RX ORDER — METHYLPREDNISOLONE SODIUM SUCCINATE 125 MG/2ML
60 INJECTION, POWDER, LYOPHILIZED, FOR SOLUTION INTRAMUSCULAR; INTRAVENOUS EVERY 8 HOURS
Status: DISCONTINUED | OUTPATIENT
Start: 2019-09-20 | End: 2019-09-20 | Stop reason: HOSPADM

## 2019-09-20 RX ORDER — PREDNISONE 20 MG/1
40 TABLET ORAL DAILY
Qty: 20 TABLET | Refills: 0 | OUTPATIENT
Start: 2019-09-20 | End: 2019-10-01 | Stop reason: HOSPADM

## 2019-09-20 RX ORDER — NICOTINE 21 MG/24HR
1 PATCH, TRANSDERMAL 24 HOURS TRANSDERMAL
Qty: 28 EACH | Refills: 1 | Status: SHIPPED | OUTPATIENT
Start: 2019-09-20 | End: 2019-10-22

## 2019-09-20 RX ORDER — FAMOTIDINE 10 MG/ML
20 INJECTION, SOLUTION INTRAVENOUS DAILY
Status: DISCONTINUED | OUTPATIENT
Start: 2019-09-20 | End: 2019-09-20 | Stop reason: HOSPADM

## 2019-09-20 RX ORDER — METRONIDAZOLE 500 MG/1
500 TABLET ORAL 3 TIMES DAILY
Qty: 30 TABLET | Refills: 0 | OUTPATIENT
Start: 2019-09-20 | End: 2019-10-01 | Stop reason: HOSPADM

## 2019-09-20 RX ORDER — NICOTINE 21 MG/24HR
1 PATCH, TRANSDERMAL 24 HOURS TRANSDERMAL
Status: DISCONTINUED | OUTPATIENT
Start: 2019-09-20 | End: 2019-09-20 | Stop reason: HOSPADM

## 2019-09-20 RX ADMIN — FAMOTIDINE 20 MG: 10 INJECTION INTRAVENOUS at 10:20

## 2019-09-20 RX ADMIN — PIPERACILLIN AND TAZOBACTAM 3.38 G: 3; .375 INJECTION, POWDER, FOR SOLUTION INTRAVENOUS at 08:08

## 2019-09-20 RX ADMIN — FLUCONAZOLE 400 MG: 2 INJECTION, SOLUTION INTRAVENOUS at 10:20

## 2019-09-20 NOTE — PLAN OF CARE
Problem: Patient Care Overview  Goal: Plan of Care Review  Outcome: Ongoing (interventions implemented as appropriate)   09/20/19 0947   Coping/Psychosocial   Plan of Care Reviewed With patient   Plan of Care Review   Progress no change   OTHER   Outcome Summary still having diahrrea, very strong foul odor. vss. will monitor.        Problem: Diarrhea (Adult)  Goal: Identify Related Risk Factors and Signs and Symptoms  Outcome: Outcome(s) achieved Date Met: 09/20/19 09/20/19 0947   Diarrhea (Adult)   Related Risk Factors (Diarrhea) infectious process;inflammatory process   Signs and Symptoms (Diarrhea) abdominal pain/cramps;loose, watery stool;urgency

## 2019-09-20 NOTE — PROGRESS NOTES
SUBJECTIVE:   2019  Chief Complaint:     Subjective      No acute overnight events.  Patient tolerated breakfast without difficulty.  Patient with IBS-D with continued diarrhea.  Patient continues to endorse nonfocal global tenderness of belly.  Patient denying hematemesis, melena, hematochezia at this time.    History:  Past Medical History:   Diagnosis Date   • Abdominal pain     right lower quadrant      • Allergy     seasonal   • Conjunctivitis    • Encounter for routine gynecological examination    • Gastroesophageal reflux disease    • Irritable bowel syndrome with diarrhea    • Pigmented skin lesion    • PONV (postoperative nausea and vomiting)    • Screening examination for venereal disease    • Viral gastroenteritis      Past Surgical History:   Procedure Laterality Date   •  SECTION  2007    (1) - Primary low transverse C section   • CHOLECYSTECTOMY     • COLONOSCOPY N/A 2019    Procedure: COLONOSCOPY;  Surgeon: Myles Freraro MD;  Location: [facility] ENDOSCOPY;  Service: Gastroenterology   • ENDOSCOPY N/A 2019    Procedure: ESOPHAGOGASTRODUODENOSCOPY;  Surgeon: Myles Ferraro MD;  Location: [facility] ENDOSCOPY;  Service: Gastroenterology   • INJECTION OF MEDICATION  2015    Zofran (1) - TRINA Whittington   • OTHER SURGICAL HISTORY       Foot/toes surgery procedure (1)  -  x 2   • OTHER SURGICAL HISTORY  2007    EXAM OF CERVIX W/SCOPE 76513 (1) - mild dysplasia(JOANIE I) Squamoglandular mucosa. Chronic cervicitis. Curettings, Endocervix: unremarkable columnar cell epithelium   • OTHER SURGICAL HISTORY  2016    Biopsy, Each Additional Lesion 28493 (1) - LONNIE Maldonado   • OTHER SURGICAL HISTORY  2010    Remove Impacted Cerumen 43454 (1) - DIONISIO Eaton   • PAP SMEAR  10/15/2007    (1) - Epithelial cell abnormality: squamous cells, Atypical squamouscells of undetermined significance. cannot exclude HSIL/(ASC-H)    • SKIN BIOPSY  2016    Biopsy of Skin 53750 (1) -  LONNIE Maldonado   • THUMB CARPOMETACARPAL JOINT ARTHROPLASTY FLEXOR CARPI RADIALIS TENDON Right 4/13/2017    Procedure: THUMB CARPOMETACARPAL JOINT ARTHROPLASTY FLEXOR CARPI RADIALIS TENDON RELEASE  EXCISION OF GANGLLION AND RELEASE OF A1 PULLEY WRIST EXTENSOR DECREASED VEINS DISEASE;  Surgeon: Kolton Emery MD;  Location: [facility];  Service:      Family History   Problem Relation Age of Onset   • Diabetes Father    • Prostate cancer Father    • Colon cancer Father      Social History     Tobacco Use   • Smoking status: Current Every Day Smoker     Packs/day: 0.75     Years: 25.00     Pack years: 18.75     Types: Cigarettes     Start date: 1/17/1990   • Smokeless tobacco: Never Used   • Tobacco comment: Current Smoker -    Substance Use Topics   • Alcohol use: No   • Drug use: No     Medications Prior to Admission   Medication Sig Dispense Refill Last Dose   • omeprazole (priLOSEC) 40 MG capsule Take 1 capsule by mouth Daily. 90 capsule 3 9/19/2019 at 0900     Allergies:  Adhesive tape; Codeine; Demerol [meperidine]; and Vicodin [hydrocodone-acetaminophen]     CURRENT MEDICATIONS/OBJECTIVE/VS/PE:     Current Medications:     Current Facility-Administered Medications   Medication Dose Route Frequency Provider Last Rate Last Dose   • acetaminophen (TYLENOL) tablet 650 mg  650 mg Oral Q6H PRN Clifford Morales DO       • famotidine (PEPCID) injection 20 mg  20 mg Intravenous Daily Haris Morin MD       • fluconazole (DIFLUCAN) IVPB 400 mg  400 mg Intravenous Daily Haris Morin MD       • ondansetron (ZOFRAN) injection 4 mg  4 mg Intravenous Q6H PRN Clifford Morales DO       • Pharmacy to Dose Zosyn   Does not apply Continuous PRN Clifford Morales DO       • piperacillin-tazobactam (ZOSYN) 3.375 g/100 mL 0.9% NS IVPB (mbp)  3.375 g Intravenous Q8H Clifford Morales DO 0 mL/hr at 09/20/19 0349 3.375 g at 09/20/19 0808   • sodium chloride 0.9 % flush 10 mL  10 mL Intravenous Q12H Clifford Morales  DO Faisal   10 mL at 09/19/19 2222   • sodium chloride 0.9 % flush 10 mL  10 mL Intravenous PRN Clifford Morales FaisalDO luz elena       • sodium chloride 0.9 % infusion  100 mL/hr Intravenous Continuous Clifford Morales  mL/hr at 09/20/19 0507 100 mL/hr at 09/20/19 0507       Objective     Review of Systems:   Review of Systems   Constitutional: Negative for activity change, appetite change, chills, diaphoresis and fever.   HENT: Negative for congestion, rhinorrhea, sinus pressure, sinus pain and sore throat.    Eyes: Negative for visual disturbance.   Respiratory: Negative for apnea, cough, choking, chest tightness, shortness of breath and wheezing.    Cardiovascular: Negative for chest pain, palpitations and leg swelling.   Gastrointestinal: Positive for abdominal pain and diarrhea. Negative for abdominal distention, blood in stool, constipation, nausea and vomiting.   Genitourinary: Negative for difficulty urinating, dysuria, frequency, hematuria and urgency.   Musculoskeletal: Negative for arthralgias, back pain, joint swelling, myalgias and neck pain.   Skin: Negative for color change, pallor, rash and wound.   Neurological: Negative for dizziness, weakness, numbness and headaches.   Psychiatric/Behavioral: Negative for agitation and behavioral problems.       Physical Exam:   Temp:  [97.5 °F (36.4 °C)-98.6 °F (37 °C)] 97.5 °F (36.4 °C)  Heart Rate:  [] 79  Resp:  [18-20] 18  BP: (105-138)/(72-90) 105/74     Physical Exam:  General Appearance:    Alert, cooperative, in no acute distress   Head:    Normocephalic, without obvious abnormality, atraumatic   Eyes:            Lids and lashes normal, conjunctivae and sclerae normal, no   icterus, no pallor, corneas clear, PERRLA   Ears:    Ears appear intact with no abnormalities noted   Throat:   No oral lesions, no thrush, oral mucosa moist   Neck:   No adenopathy, supple, trachea midline, no thyromegaly, no     carotid bruit, no JVD   Back:     No kyphosis  present, no scoliosis present, no skin lesions,       erythema or scars, no tenderness to percussion or                   palpation,   range of motion normal   Lungs:     Clear to auscultation,respirations regular, even and                   unlabored    Heart:    Regular rhythm and normal rate, normal S1 and S2, no            murmur, no gallop, no rub, no click   Breast Exam:    Deferred   Abdomen:    Hyperresonant bowel sounds, globally tender most discernible pattern is colonic   Genitalia:    Deferred   Extremities:   Moves all extremities well, no edema, no cyanosis, no              redness   Pulses:   Pulses palpable and equal bilaterally   Skin:   No bleeding, bruising or rash   Lymph nodes:   No palpable adenopathy   Neurologic:   Cranial nerves 2 - 12 grossly intact, sensation intact, DTR        present and equal bilaterally      Results Review:     Lab Results (last 24 hours)     Procedure Component Value Units Date/Time    Gastrointestinal Panel, PCR - Stool, Per Rectum [[id number]]  (Normal) Collected:  09/20/19 0640    Specimen:  Stool from Per Rectum Updated:  09/20/19 0907     Campylobacter Not Detected     Clostridium difficile (toxin A/B) Not Detected     Plesiomonas shigelloides Not Detected     Salmonella Not Detected     Vibrio Not Detected     Vibrio cholerae Not Detected     Yersinia enterocolitica Not Detected     Enteroaggregative E. coli (EAEC) Not Detected     Enteropathogenic E. coli (EPEC) Not Detected     Enterotoxigenic E. coli (ETEC) lt/st Not Detected     Shiga-like toxin-producing E. coli (STEC) stx1/stx2 Not Detected     E. coli O157 Not Detected     Shigella/Enteroinvasive E. coli (EIEC) Not Detected     Cryptosporidium Not Detected     Cyclospora cayetanensis Not Detected     Entamoeba histolytica Not Detected     Giardia lamblia Not Detected     Adenovirus F40/41 Not Detected     Astrovirus Not Detected     Norovirus GI/GII Not Detected     Rotavirus A Not Detected     Sapovirus  (I, II, IV or V) Not Detected    Narrative:       Testing performed by multiplex PCR system.    Basic Metabolic Panel [[id number]]  (Abnormal) Collected:  09/20/19 0629    Specimen:  Blood Updated:  09/20/19 0727     Glucose 96 mg/dL      BUN 12 mg/dL      Creatinine 1.14 mg/dL      Sodium 134 mmol/L      Potassium 3.4 mmol/L      Chloride 99 mmol/L      CO2 25.0 mmol/L      Calcium 8.7 mg/dL      eGFR Non African Amer 52 mL/min/1.73      BUN/Creatinine Ratio 10.5     Anion Gap 10.0 mmol/L     Narrative:       GFR Normal >60  Chronic Kidney Disease <60  Kidney Failure <15    Magnesium [[id number]]  (Normal) Collected:  09/20/19 0629    Specimen:  Blood Updated:  09/20/19 0721     Magnesium 1.9 mg/dL     CBC Auto Differential [[id number]]  (Abnormal) Collected:  09/20/19 0629    Specimen:  Blood Updated:  09/20/19 0653     WBC 14.18 10*3/mm3      RBC 5.03 10*6/mm3      Hemoglobin 12.7 g/dL      Hematocrit 39.4 %      MCV 78.3 fL      MCH 25.2 pg      MCHC 32.2 g/dL      RDW 15.5 %      RDW-SD 43.7 fl      MPV 9.9 fL      Platelets 406 10*3/mm3      Neutrophil % 68.9 %      Lymphocyte % 17.9 %      Monocyte % 10.2 %      Eosinophil % 2.2 %      Basophil % 0.4 %      Immature Grans % 0.4 %      Neutrophils, Absolute 9.77 10*3/mm3      Lymphocytes, Absolute 2.54 10*3/mm3      Monocytes, Absolute 1.45 10*3/mm3      Eosinophils, Absolute 0.31 10*3/mm3      Basophils, Absolute 0.06 10*3/mm3      Immature Grans, Absolute 0.05 10*3/mm3      nRBC 0.0 /100 WBC     [place] Draw [[id number]] Collected:  09/19/19 1340    Specimen:  Blood Updated:  09/20/19 0625    Narrative:       The following orders were created for panel order [place] Draw.  Procedure                               Abnormality         Status                     ---------                               -----------         ------                     Light Blue Top[[id number]]                                   Final result               Green Top (Gel)[[id number]]                                   Final result               Lavender Top[[id number]]                                     Final result               Gold Top - SST[[id number]]                                                                Please view results for these tests on the individual orders.    Urinalysis, Microscopic Only - Urine, Clean Catch [[id number]]  (Abnormal) Collected:  09/19/19 1356    Specimen:  Urine, Clean Catch Updated:  09/19/19 1702     RBC, UA 6-12 /HPF      WBC, UA 6-12 /HPF      Bacteria, UA 2+ /HPF      Squamous Epithelial Cells, UA 6-12 /HPF      Yeast, UA Small/1+ Yeast /HPF      Hyaline Casts, UA Too Numerous to Count /LPF      Granular Casts, UA 0-2 /LPF      Mucus, UA Small/1+ /HPF      Methodology Manual Light Microscopy    CK [[id number]]  (Normal) Collected:  09/19/19 1340    Specimen:  Blood Updated:  09/19/19 1458     Creatine Kinase 142 U/L     Light Blue Top [[id number]] Collected:  09/19/19 1340    Specimen:  Blood Updated:  09/19/19 1446     Extra Tube hold for add-on     Comment: Auto resulted       Green Top (Gel) [[id number]] Collected:  09/19/19 1340    Specimen:  Blood Updated:  09/19/19 1446     Extra Tube Hold for add-ons.     Comment: Auto resulted.       Lavender Top [[id number]] Collected:  09/19/19 1340    Specimen:  Blood Updated:  09/19/19 1446     Extra Tube hold for add-on     Comment: Auto resulted       Urinalysis With Microscopic If Indicated (No Culture) - Urine, Clean Catch [[id number]]  (Abnormal) Collected:  09/19/19 1356    Specimen:  Urine, Clean Catch Updated:  09/19/19 1415     Color, UA Dark Yellow     Appearance, UA Cloudy     pH, UA <=5.0     Specific Gravity, UA 1.037     Comment: BY REFRACTOMETER        Glucose, UA Negative     Ketones, UA 15 mg/dL (1+)     Bilirubin, UA Large (3+)     Blood, UA Small (1+)     Protein,  mg/dL (2+)     Leuk Esterase, UA Negative     Nitrite, UA Negative     Urobilinogen, UA 1.0 E.U./dL    Comprehensive Metabolic Panel  [[id number]]  (Abnormal) Collected:  09/19/19 1340    Specimen:  Blood Updated:  09/19/19 1414     Glucose 126 mg/dL      BUN 16 mg/dL      Creatinine 1.16 mg/dL      Sodium 135 mmol/L      Potassium 3.4 mmol/L      Chloride 98 mmol/L      CO2 20.0 mmol/L      Calcium 9.5 mg/dL      Total Protein 7.8 g/dL      Albumin 4.20 g/dL      ALT (SGPT) 11 U/L      AST (SGOT) 12 U/L      Alkaline Phosphatase 77 U/L      Total Bilirubin 0.4 mg/dL      eGFR Non African Amer 51 mL/min/1.73      Globulin 3.6 gm/dL      A/G Ratio 1.2 g/dL      BUN/Creatinine Ratio 13.8     Anion Gap 17.0 mmol/L     Narrative:       GFR Normal >60  Chronic Kidney Disease <60  Kidney Failure <15    Lipase [[id number]]  (Normal) Collected:  09/19/19 1340    Specimen:  Blood Updated:  09/19/19 1414     Lipase 33 U/L     hCG, Serum, Qualitative [[id number]]  (Normal) Collected:  09/19/19 1340    Specimen:  Blood Updated:  09/19/19 1406     HCG Qualitative Negative    CBC & Differential [[id number]] Collected:  09/19/19 1340    Specimen:  Blood Updated:  09/19/19 1353    Narrative:       The following orders were created for panel order CBC & Differential.  Procedure                               Abnormality         Status                     ---------                               -----------         ------                     CBC Auto Differential[[id number]]        Abnormal            Final result                 Please view results for these tests on the individual orders.    CBC Auto Differential [[id number]]  (Abnormal) Collected:  09/19/19 1340    Specimen:  Blood Updated:  09/19/19 1353     WBC 21.92 10*3/mm3      RBC 5.43 10*6/mm3      Hemoglobin 14.0 g/dL      Hematocrit 42.1 %      MCV 77.5 fL      MCH 25.8 pg      MCHC 33.3 g/dL      RDW 15.7 %      RDW-SD 43.4 fl      MPV 9.4 fL      Platelets 499 10*3/mm3      Neutrophil % 78.5 %      Lymphocyte % 11.5 %      Monocyte % 8.8 %      Eosinophil % 0.5 %      Basophil % 0.2 %      Immature Grans %  0.5 %      Neutrophils, Absolute 17.19 10*3/mm3      Lymphocytes, Absolute 2.53 10*3/mm3      Monocytes, Absolute 1.92 10*3/mm3      Eosinophils, Absolute 0.12 10*3/mm3      Basophils, Absolute 0.05 10*3/mm3      Immature Grans, Absolute 0.11 10*3/mm3      nRBC 0.0 /100 WBC            I reviewed the patient's new clinical results.  I reviewed the patient's new imaging results and agree with the interpretation.     ASSESSMENT/PLAN:   ASSESSMENT: 1.  Acute colitis    PLAN: Infectious versus inflammatory colitis.  Differential includes Crohn's versus ulcerative colitis versus less likely ischemic colitis.  Pathology nonspecific inflammation.    -Discharge with metronidazole 500 mg p.o. 3 times daily total of 10 days  - Discharged with prednisone 40 mg daily  -Dr. Ferraro will address discontinuation of glucocorticoids.  -Patient able to be discharged from GI standpoint tolerating oral diet today.       Discussed with Dr. Freraro      The risks, benefits, and alternatives of this procedure have been discussed with the patient or the responsible party- the patient understands and agrees to proceed.             This document has been electronically signed by Joey Rojas III, MD on September 20, 2019 10:03 AM

## 2019-09-20 NOTE — PLAN OF CARE
Problem: Patient Care Overview  Goal: Plan of Care Review  Outcome: Ongoing (interventions implemented as appropriate)   09/20/19 0335   Coping/Psychosocial   Plan of Care Reviewed With patient   Plan of Care Review   Progress no change   OTHER   Outcome Summary new admit, vss, started zosyn, rested well during the night, will continue to monitor.       Problem: Nausea/Vomiting (Adult)  Goal: Identify Related Risk Factors and Signs and Symptoms  Outcome: Outcome(s) achieved Date Met: 09/20/19    Goal: Symptom Relief  Outcome: Ongoing (interventions implemented as appropriate)

## 2019-09-20 NOTE — CONSULTS
SUBJECTIVE:   9/20/2019    Name: Suzi Heck  DOD: 1975    REASON FOR CONSULT: Inflammatory changes in the terminal ileum    Chief Complaint:     Chief Complaint   Patient presents with   • Nausea   • Vomiting   • Diarrhea       Subjective     Patient is 44 y.o. female presents with complaint of abdominal pain.  Patient was found to have a markedly elevated white count.  Patient recently underwent an colonoscopy which showed ulcerated lesions within the terminal ileum it would be consistent with Crohn's disease versus infectious colitis.  Patient's symptoms are worsened after the colonoscopy patient presented with a markedly elevated white count will start on antibiotics patient feels better at this time no nausea vomiting and tolerating foods..      ROS/HISTORY/ CURRENT MEDICATIONS/OBJECTIVE/VS/PE:   Review of Systems:   Review of Systems   Constitutional: Negative for activity change, appetite change, chills, diaphoresis, fatigue, fever and unexpected weight change.   HENT: Negative for sore throat and trouble swallowing.    Respiratory: Negative for shortness of breath.    Gastrointestinal: Positive for abdominal pain and diarrhea. Negative for abdominal distention, anal bleeding, blood in stool, constipation, nausea, rectal pain and vomiting.   Endocrine: Negative for polydipsia, polyphagia and polyuria.   Genitourinary: Negative for difficulty urinating.   Musculoskeletal: Negative for arthralgias.   Skin: Negative for pallor.   Allergic/Immunologic: Negative for food allergies.   Neurological: Negative for weakness and light-headedness.   Psychiatric/Behavioral: Negative for behavioral problems.       History:     Past Medical History:   Diagnosis Date   • Abdominal pain     right lower quadrant      • Allergy     seasonal   • Conjunctivitis    • Encounter for routine gynecological examination    • Gastroesophageal reflux disease    • Irritable bowel syndrome with diarrhea    • Pigmented skin  lesion    • PONV (postoperative nausea and vomiting)    • Screening examination for venereal disease    • Viral gastroenteritis      Past Surgical History:   Procedure Laterality Date   •  SECTION  2007    (1) - Primary low transverse C section   • CHOLECYSTECTOMY     • COLONOSCOPY N/A 2019    Procedure: COLONOSCOPY;  Surgeon: Myles Ferraro MD;  Location: [facility] ENDOSCOPY;  Service: Gastroenterology   • ENDOSCOPY N/A 2019    Procedure: ESOPHAGOGASTRODUODENOSCOPY;  Surgeon: Myles Ferraro MD;  Location: [facility] ENDOSCOPY;  Service: Gastroenterology   • INJECTION OF MEDICATION  2015    Zofran (1) - TRINA Whittington   • OTHER SURGICAL HISTORY       Foot/toes surgery procedure (1)  -  x 2   • OTHER SURGICAL HISTORY  2007    EXAM OF CERVIX W/SCOPE 58994 (1) - mild dysplasia(JOANIE I) Squamoglandular mucosa. Chronic cervicitis. Curettings, Endocervix: unremarkable columnar cell epithelium   • OTHER SURGICAL HISTORY  2016    Biopsy, Each Additional Lesion 45699 (1) - LONNIE Maldonado   • OTHER SURGICAL HISTORY  2010    Remove Impacted Cerumen 97224 (1) - DIONISIO Eaton   • PAP SMEAR  10/15/2007    (1) - Epithelial cell abnormality: squamous cells, Atypical squamouscells of undetermined significance. cannot exclude HSIL/(ASC-H)    • SKIN BIOPSY  2016    Biopsy of Skin 30726 (1) - LONNIE Maldonado   • THUMB CARPOMETACARPAL JOINT ARTHROPLASTY FLEXOR CARPI RADIALIS TENDON Right 2017    Procedure: THUMB CARPOMETACARPAL JOINT ARTHROPLASTY FLEXOR CARPI RADIALIS TENDON RELEASE  EXCISION OF GANGLLION AND RELEASE OF A1 PULLEY WRIST EXTENSOR DECREASED VEINS DISEASE;  Surgeon: Kolton Emery MD;  Location: [facility] OR;  Service:      Family History   Problem Relation Age of Onset   • Diabetes Father    • Prostate cancer Father    • Colon cancer Father      Social History     Tobacco Use   • Smoking status: Current Every Day Smoker     Packs/day: 0.75     Years: 25.00     Pack years: 18.75     Types:  Cigarettes     Start date: 1/17/1990   • Smokeless tobacco: Never Used   • Tobacco comment: Current Smoker -    Substance Use Topics   • Alcohol use: No   • Drug use: No     Medications Prior to Admission   Medication Sig Dispense Refill Last Dose   • omeprazole (priLOSEC) 40 MG capsule Take 1 capsule by mouth Daily. 90 capsule 3 9/19/2019 at 0900     Allergies:  Adhesive tape; Codeine; Demerol [meperidine]; and Vicodin [hydrocodone-acetaminophen]    I have reviewed the patient's medical history, surgical history and family history in the available medical record system.     Current Medications:     Current Facility-Administered Medications   Medication Dose Route Frequency Provider Last Rate Last Dose   • acetaminophen (TYLENOL) tablet 650 mg  650 mg Oral Q6H PRN Clifford Morales DO       • famotidine (PEPCID) injection 20 mg  20 mg Intravenous Daily Haris Morin MD   20 mg at 09/20/19 1020   • fluconazole (DIFLUCAN) IVPB 400 mg  400 mg Intravenous Daily Haris Morin MD   400 mg at 09/20/19 1020   • methylPREDNISolone sodium succinate (SOLU-Medrol) injection 60 mg  60 mg Intravenous Q8H CODY'Joey Smalls III, MD       • metroNIDAZOLE (FLAGYL) 500 mg/100mL IVPB  500 mg Intravenous Q8H Joey Rojas III, MD       • nicotine (NICODERM CQ) 21 MG/24HR patch 1 patch  1 patch Transdermal Q24H Joey Rojas III, MD       • ondansetron (ZOFRAN) injection 4 mg  4 mg Intravenous Q6H PRN Clifford Morales DO       • Pharmacy to Dose Zosyn   Does not apply Continuous PRN Clifford Morales DO       • piperacillin-tazobactam (ZOSYN) 3.375 g/100 mL 0.9% NS IVPB (mbp)  3.375 g Intravenous Q8H Clifford Morales DO 0 mL/hr at 09/20/19 0349 3.375 g at 09/20/19 0808   • sodium chloride 0.9 % flush 10 mL  10 mL Intravenous Q12H Clifford Morales DO   10 mL at 09/19/19 2222   • sodium chloride 0.9 % flush 10 mL  10 mL Intravenous PRN Clifford Morales, DO       • sodium chloride 0.9 %  infusion  100 mL/hr Intravenous Continuous Clifford Morales  mL/hr at 09/20/19 0507 100 mL/hr at 09/20/19 0507       Objective     Physical Exam:   Temp:  [97.2 °F (36.2 °C)-98.2 °F (36.8 °C)] 97.2 °F (36.2 °C)  Heart Rate:  [] 82  Resp:  [18-19] 18  BP: (105-130)/(72-90) 116/73    Physical Exam:  General Appearance:    Alert, cooperative, in no acute distress   Head:    Normocephalic, without obvious abnormality, atraumatic   Eyes:            Lids and lashes normal, conjunctivae and sclerae normal, no   icterus, no pallor, corneas clear, PERRLA   Ears:    Ears appear intact with no abnormalities noted   Throat:   No oral lesions, no thrush, oral mucosa moist   Neck:   No adenopathy, supple, trachea midline, no thyromegaly, no     carotid bruit, no JVD   Back:     No kyphosis present, no scoliosis present, no skin lesions,       erythema or scars, no tenderness to percussion or                   palpation,   range of motion normal   Lungs:     Clear to auscultation,respirations regular, even and                   unlabored    Heart:    Regular rhythm and normal rate, normal S1 and S2, no            murmur, no gallop, no rub, no click   Breast Exam:    Deferred   Abdomen:     Normal bowel sounds, no masses, no organomegaly, soft        non-tender, non-distended, no guarding, no rebound                 tenderness   Genitalia:    Deferred   Extremities:   Moves all extremities well, no edema, no cyanosis, no              redness   Pulses:   Pulses palpable and equal bilaterally   Skin:   No bleeding, bruising or rash   Lymph nodes:   No palpable adenopathy   Neurologic:   Cranial nerves 2 - 12 grossly intact, sensation intact, DTR        present and equal bilaterally      Results Review:     Lab Results   Component Value Date    WBC 14.18 (H) 09/20/2019    WBC 21.92 (H) 09/19/2019    WBC 12.87 (H) 03/29/2018    HGB 12.7 09/20/2019    HGB 14.0 09/19/2019    HGB 12.5 03/29/2018    HCT 39.4 09/20/2019     HCT 42.1 09/19/2019    HCT 38.5 03/29/2018     09/20/2019     (H) 09/19/2019     03/29/2018     Results from last 7 days   Lab Units 09/19/19  1340   ALK PHOS U/L 77   ALT (SGPT) U/L 11   AST (SGOT) U/L 12     Results from last 7 days   Lab Units 09/19/19  1340   BILIRUBIN mg/dL 0.4   ALK PHOS U/L 77     Lipase   Date Value Ref Range Status   09/19/2019 33 13 - 60 U/L Final     No results found for: INR      Radiology Review:  Imaging Results (last 72 hours)     Procedure Component Value Units Date/Time    CT Abdomen Pelvis Without Contrast [[id number]] Collected:  09/19/19 1509     Updated:  09/19/19 1617    Narrative:       PROCEDURE: CT abdomen and pelvis without intravenous contrast    HISTORY: Abdominal pain and intractable nausea and vomiting  postprocedure. Status post EGD on 9/18/2019.    This exam was performed using radiation doses that are as low as  reasonably achievable (ALARA).  This exam was performed according to our departmental dose  optimization program, which includes automated exposure control,  adjustment of the mA and/or KV according to patient size and/or  use of iterative reconstruction technique.    COMPARISON: CT abdomen pelvis dated 8/20/2019.    CONTRAST: None    TECHNIQUE: Multiple contiguous noncontrast axial images are  obtained of the abdomen and pelvis.     FINDINGS:     LOWER CHEST: Unremarkable.    HEPATOBILIARY: Post cholecystectomy.  SPLEEN: Unremarkable.  PANCREAS: Unremarkable  ADRENAL GLANDS: Unremarkable.  KIDNEYS/URETERS: No evidence of hydronephrosis or suspicious  mass.    GASTROINTESTINAL: Loop of ileum in the right lower quadrant which  demonstrates bowel wall thickening similar to the prior study.  The associated mesentery appears hypervascular. Loop of jejunum  in the left upper quadrant appears prominent with air-fluid  levels, possibly due to a focal ileus. There appears to be liquid  stool in the sigmoid colon. A normal appendix is  visualized.  Incidental note is made of a Meckel's diverticulum arising from  the distal ileum in the right lower quadrant. The diverticulum  measures approximately 2.8 cm in length and 1.8 cm in diameter.  REPRODUCTIVE ORGANS: Unremarkable.  URINARY BLADDER: Unremarkable    VASCULAR: Unremarkable  LYMPH NODES: There is a group of slightly enlarged mesenteric  lymph nodes with the largest measuring 1.4 x 2 cm in the  midabdomen just to the right of midline, with surrounding fat  stranding in the mesentery, suspicious for mesenteric  panniculitis or less likely neoplasm such as lymphoma. Findings  are similar to the prior exam.  PERITONEUM/RETROPERITONEUM: Unremarkable.     OSSEOUS STRUCTURES: Unremarkable.        Impression:       CONCLUSION:   1.  Dilated loops of left upper quadrant small bowel with  air-fluid levels.  2.  Bowel wall thickening and hypervascular mesentery in the  right lower quadrant jejunum.  3.  Slightly enlarged mesenteric lymph nodes in the right  midabdomen with surrounding inflammation and hypervascularity.  4.  This constellation of findings suggests the possibility of  Crohn's disease.  5.  Given the lymph node enlargement and surrounding fat  stranding in the mesentery, a neoplastic process such as lymphoma  although less likely could also be considered.  6.  Incidental note is made of a Meckel's diverticulum arising  from the distal ileum in the right lower quadrant. The  diverticulum measures approximately 2.8 cm in length and 1.8 cm  in diameter.    Electronically signed by:  Joey Ma MD  9/19/2019 4:16 PM CDT  Workstation: [id number]    XR Abdomen Flat & Upright [[id number]] Collected:  09/19/19 1448     Updated:  09/19/19 1459    Narrative:       PROCEDURE: Two-view abdomen    COMPARISON: None    HISTORY: abdominal pain    TECHNIQUE: Upright and supine views of the abdomen are obtained.     FINDINGS:  Surgical clips in the right upper quadrant.   Multiple prominent loops of bowel in  the upper abdomen are  nonspecific, cannot exclude small bowel obstruction.  Calcifications in the pelvis are likely phleboliths similar to  the prior study.      Impression:       CONCLUSION:    Multiple prominent loops of bowel in the upper abdomen are  nonspecific, cannot exclude small bowel obstruction.    Electronically signed by:  Joey Ma MD  9/19/2019 2:58 PM CDT  Workstation: [id number]          I reviewed the patient's new clinical results.    I reviewed the patient's new imaging results and agree with the interpretation.     ASSESSMENT/PLAN:   ASSESSMENT: Patient with inflammatory changes in the terminal ileum most likely secondary to infectious colitis versus Crohn's disease.  Patient is responding well to antibiotics and is tolerating a diet at this time.    PLAN: #1 we will start patient on prednisone 40 mg daily.  #2 would continue patient on Flagyl 500 mg every 8 hours for 10 days.  #3 patient be discharged from GI standpoint.  Patient should follow-up with GI in 1 week.  #4 we will sign off from a GI standpoint at this time.  The risks, benefits, and alternatives of this procedure have been discussed with the patient or the responsible party. The patient understands and agrees to proceed.         Myles Ferraro MD  09/20/19  11:37 AM         This document has been electronically signed by Myles Ferraro MD on September 20, 2019 11:37 AM

## 2019-09-20 NOTE — DISCHARGE INSTRUCTIONS
Colitis    Colitis is inflammation of the colon. Colitis may last a short time (acute) or it may last a long time (chronic).  What are the causes?  This condition may be caused by:  · Viruses.  · Bacteria.  · Reactions to medicine.  · Certain autoimmune diseases, such as Crohn disease or ulcerative colitis.  What are the signs or symptoms?  Symptoms of this condition include:  · Diarrhea.  · Passing bloody or tarry stool.  · Pain.  · Fever.  · Vomiting.  · Tiredness (fatigue).  · Weight loss.  · Bloating.  · Sudden increase in abdominal pain.  · Having fewer bowel movements than usual.  How is this diagnosed?  This condition is diagnosed with a stool test or a blood test. You may also have other tests, including X-rays, a CT scan, or a colonoscopy.  How is this treated?  Treatment may include:  · Resting the bowel. This involves not eating or drinking for a period of time.  · Fluids that are given through an IV tube.  · Medicine for pain and diarrhea.  · Antibiotic medicines.  · Cortisone medicines.  · Surgery.  Follow these instructions at home:  Eating and drinking  · Follow instructions from your health care provider about eating or drinking restrictions.  · Drink enough fluid to keep your urine clear or pale yellow.  · Work with a dietitian to determine which foods cause your condition to flare up.  · Avoid foods that cause flare-ups.  · Eat a well-balanced diet.  Medicines  · Take over-the-counter and prescription medicines only as told by your health care provider.  · If you were prescribed an antibiotic medicine, take it as told by your health care provider. Do not stop taking the antibiotic even if you start to feel better.  General instructions  · Keep all follow-up visits as told by your health care provider. This is important.  Contact a health care provider if:  · Your symptoms do not go away.  · You develop new symptoms.  Get help right away if:  · You have a fever that does not go away with  treatment.  · You develop chills.  · You have extreme weakness, fainting, or dehydration.  · You have repeated vomiting.  · You develop severe pain in your abdomen.  · You pass bloody or tarry stool.  This information is not intended to replace advice given to you by your health care provider. Make sure you discuss any questions you have with your health care provider.  Document Released: 01/25/2006 Document Revised: 05/25/2017 Document Reviewed: 04/11/2016  [employer] Interactive Patient Education © 2019 [employer]

## 2019-09-20 NOTE — DISCHARGE SUMMARY
[facility] Medicine Services  DISCHARGE SUMMARY       Date of Admission: 9/19/2019  Date of Discharge:  9/20/2019  Primary Care Physician: Nicholas Maldonado MD    Presenting Problem/History of Present Illness:  Nonspecific mesenteric adenitis [I88.0]  Nausea & vomiting [R11.2]  Dehydration, moderate [E86.0]  BECCA (acute kidney injury) (CMS/HCC) [N17.9]  Nausea vomiting and diarrhea [R11.2, R19.7]  Bowel wall thickening [K63.9]   44-year-old female with past medical hx of gerd who was admitted as a direct admission from from a local urgent care due to nausea vomiting and diarrhea that started approximately 24 hours ago. Of note, pt recently had egd and colonosocpy done by dr. woodward on 9/17/2019. Pt had the procedure because she has been having    abdominal pain and diarrhea.      Final Discharge Diagnoses:  Active Hospital Problems    Diagnosis   • Nausea & vomiting   • Bowel wall thickening       Consults:   Consults     Date and Time Order Name Status Description    9/19/2019 1725 Gastroenterology (on-call MD unless specified) Completed           Procedures Performed: None.                Pertinent Test Results:   Lab Results (last 72 hours)     Procedure Component Value Units Date/Time    Gastrointestinal Panel, PCR - Stool, Per Rectum [[id number]]  (Normal) Collected:  09/20/19 0640    Specimen:  Stool from Per Rectum Updated:  09/20/19 0907     Campylobacter Not Detected     Clostridium difficile (toxin A/B) Not Detected     Plesiomonas shigelloides Not Detected     Salmonella Not Detected     Vibrio Not Detected     Vibrio cholerae Not Detected     Yersinia enterocolitica Not Detected     Enteroaggregative E. coli (EAEC) Not Detected     Enteropathogenic E. coli (EPEC) Not Detected     Enterotoxigenic E. coli (ETEC) lt/st Not Detected     Shiga-like toxin-producing E. coli (STEC) stx1/stx2 Not Detected     E. coli O157 Not Detected     Shigella/Enteroinvasive E.  coli (EIEC) Not Detected     Cryptosporidium Not Detected     Cyclospora cayetanensis Not Detected     Entamoeba histolytica Not Detected     Giardia lamblia Not Detected     Adenovirus F40/41 Not Detected     Astrovirus Not Detected     Norovirus GI/GII Not Detected     Rotavirus A Not Detected     Sapovirus (I, II, IV or V) Not Detected    Narrative:       Testing performed by multiplex PCR system.    Basic Metabolic Panel [[id number]]  (Abnormal) Collected:  09/20/19 0629    Specimen:  Blood Updated:  09/20/19 0727     Glucose 96 mg/dL      BUN 12 mg/dL      Creatinine 1.14 mg/dL      Sodium 134 mmol/L      Potassium 3.4 mmol/L      Chloride 99 mmol/L      CO2 25.0 mmol/L      Calcium 8.7 mg/dL      eGFR Non African Amer 52 mL/min/1.73      BUN/Creatinine Ratio 10.5     Anion Gap 10.0 mmol/L     Narrative:       GFR Normal >60  Chronic Kidney Disease <60  Kidney Failure <15    Magnesium [[id number]]  (Normal) Collected:  09/20/19 0629    Specimen:  Blood Updated:  09/20/19 0721     Magnesium 1.9 mg/dL     CBC Auto Differential [[id number]]  (Abnormal) Collected:  09/20/19 0629    Specimen:  Blood Updated:  09/20/19 0653     WBC 14.18 10*3/mm3      RBC 5.03 10*6/mm3      Hemoglobin 12.7 g/dL      Hematocrit 39.4 %      MCV 78.3 fL      MCH 25.2 pg      MCHC 32.2 g/dL      RDW 15.5 %      RDW-SD 43.7 fl      MPV 9.9 fL      Platelets 406 10*3/mm3      Neutrophil % 68.9 %      Lymphocyte % 17.9 %      Monocyte % 10.2 %      Eosinophil % 2.2 %      Basophil % 0.4 %      Immature Grans % 0.4 %      Neutrophils, Absolute 9.77 10*3/mm3      Lymphocytes, Absolute 2.54 10*3/mm3      Monocytes, Absolute 1.45 10*3/mm3      Eosinophils, Absolute 0.31 10*3/mm3      Basophils, Absolute 0.06 10*3/mm3      Immature Grans, Absolute 0.05 10*3/mm3      nRBC 0.0 /100 WBC     [place] Draw [[id number]] Collected:  09/19/19 1340    Specimen:  Blood Updated:  09/20/19 0625    Narrative:       The following orders were created for panel  order [place] Draw.  Procedure                               Abnormality         Status                     ---------                               -----------         ------                     Light Blue Top[[id number]]                                   Final result               Green Top (Gel)[[id number]]                                  Final result               Lavender Top[[id number]]                                     Final result               Gold Top - SST[[id number]]                                                                Please view results for these tests on the individual orders.    Urinalysis, Microscopic Only - Urine, Clean Catch [[id number]]  (Abnormal) Collected:  09/19/19 1356    Specimen:  Urine, Clean Catch Updated:  09/19/19 1702     RBC, UA 6-12 /HPF      WBC, UA 6-12 /HPF      Bacteria, UA 2+ /HPF      Squamous Epithelial Cells, UA 6-12 /HPF      Yeast, UA Small/1+ Yeast /HPF      Hyaline Casts, UA Too Numerous to Count /LPF      Granular Casts, UA 0-2 /LPF      Mucus, UA Small/1+ /HPF      Methodology Manual Light Microscopy    CK [[id number]]  (Normal) Collected:  09/19/19 1340    Specimen:  Blood Updated:  09/19/19 1458     Creatine Kinase 142 U/L     Light Blue Top [[id number]] Collected:  09/19/19 1340    Specimen:  Blood Updated:  09/19/19 1446     Extra Tube hold for add-on     Comment: Auto resulted       Green Top (Gel) [[id number]] Collected:  09/19/19 1340    Specimen:  Blood Updated:  09/19/19 1446     Extra Tube Hold for add-ons.     Comment: Auto resulted.       Lavender Top [[id number]] Collected:  09/19/19 1340    Specimen:  Blood Updated:  09/19/19 1446     Extra Tube hold for add-on     Comment: Auto resulted       Urinalysis With Microscopic If Indicated (No Culture) - Urine, Clean Catch [[id number]]  (Abnormal) Collected:  09/19/19 1356    Specimen:  Urine, Clean Catch Updated:  09/19/19 1415     Color, UA Dark Yellow     Appearance, UA Cloudy     pH, UA <=5.0     Specific  Gravity, UA 1.037     Comment: BY REFRACTOMETER        Glucose, UA Negative     Ketones, UA 15 mg/dL (1+)     Bilirubin, UA Large (3+)     Blood, UA Small (1+)     Protein,  mg/dL (2+)     Leuk Esterase, UA Negative     Nitrite, UA Negative     Urobilinogen, UA 1.0 E.U./dL    Comprehensive Metabolic Panel [[id number]]  (Abnormal) Collected:  09/19/19 1340    Specimen:  Blood Updated:  09/19/19 1414     Glucose 126 mg/dL      BUN 16 mg/dL      Creatinine 1.16 mg/dL      Sodium 135 mmol/L      Potassium 3.4 mmol/L      Chloride 98 mmol/L      CO2 20.0 mmol/L      Calcium 9.5 mg/dL      Total Protein 7.8 g/dL      Albumin 4.20 g/dL      ALT (SGPT) 11 U/L      AST (SGOT) 12 U/L      Alkaline Phosphatase 77 U/L      Total Bilirubin 0.4 mg/dL      eGFR Non African Amer 51 mL/min/1.73      Globulin 3.6 gm/dL      A/G Ratio 1.2 g/dL      BUN/Creatinine Ratio 13.8     Anion Gap 17.0 mmol/L     Narrative:       GFR Normal >60  Chronic Kidney Disease <60  Kidney Failure <15    Lipase [[id number]]  (Normal) Collected:  09/19/19 1340    Specimen:  Blood Updated:  09/19/19 1414     Lipase 33 U/L     hCG, Serum, Qualitative [[id number]]  (Normal) Collected:  09/19/19 1340    Specimen:  Blood Updated:  09/19/19 1406     HCG Qualitative Negative    CBC & Differential [[id number]] Collected:  09/19/19 1340    Specimen:  Blood Updated:  09/19/19 1353    Narrative:       The following orders were created for panel order CBC & Differential.  Procedure                               Abnormality         Status                     ---------                               -----------         ------                     CBC Auto Differential[[id number]]        Abnormal            Final result                 Please view results for these tests on the individual orders.    CBC Auto Differential [[id number]]  (Abnormal) Collected:  09/19/19 1340    Specimen:  Blood Updated:  09/19/19 1353     WBC 21.92 10*3/mm3      RBC 5.43 10*6/mm3       Hemoglobin 14.0 g/dL      Hematocrit 42.1 %      MCV 77.5 fL      MCH 25.8 pg      MCHC 33.3 g/dL      RDW 15.7 %      RDW-SD 43.4 fl      MPV 9.4 fL      Platelets 499 10*3/mm3      Neutrophil % 78.5 %      Lymphocyte % 11.5 %      Monocyte % 8.8 %      Eosinophil % 0.5 %      Basophil % 0.2 %      Immature Grans % 0.5 %      Neutrophils, Absolute 17.19 10*3/mm3      Lymphocytes, Absolute 2.53 10*3/mm3      Monocytes, Absolute 1.92 10*3/mm3      Eosinophils, Absolute 0.12 10*3/mm3      Basophils, Absolute 0.05 10*3/mm3      Immature Grans, Absolute 0.11 10*3/mm3      nRBC 0.0 /100 WBC         Imaging Results (last 72 hours)     Procedure Component Value Units Date/Time    CT Abdomen Pelvis Without Contrast [[id number]] Collected:  09/19/19 1509     Updated:  09/19/19 1617    Narrative:       PROCEDURE: CT abdomen and pelvis without intravenous contrast    HISTORY: Abdominal pain and intractable nausea and vomiting  postprocedure. Status post EGD on 9/18/2019.    This exam was performed using radiation doses that are as low as  reasonably achievable (ALARA).  This exam was performed according to our departmental dose  optimization program, which includes automated exposure control,  adjustment of the mA and/or KV according to patient size and/or  use of iterative reconstruction technique.    COMPARISON: CT abdomen pelvis dated 8/20/2019.    CONTRAST: None    TECHNIQUE: Multiple contiguous noncontrast axial images are  obtained of the abdomen and pelvis.     FINDINGS:     LOWER CHEST: Unremarkable.    HEPATOBILIARY: Post cholecystectomy.  SPLEEN: Unremarkable.  PANCREAS: Unremarkable  ADRENAL GLANDS: Unremarkable.  KIDNEYS/URETERS: No evidence of hydronephrosis or suspicious  mass.    GASTROINTESTINAL: Loop of ileum in the right lower quadrant which  demonstrates bowel wall thickening similar to the prior study.  The associated mesentery appears hypervascular. Loop of jejunum  in the left upper quadrant appears  prominent with air-fluid  levels, possibly due to a focal ileus. There appears to be liquid  stool in the sigmoid colon. A normal appendix is visualized.  Incidental note is made of a Meckel's diverticulum arising from  the distal ileum in the right lower quadrant. The diverticulum  measures approximately 2.8 cm in length and 1.8 cm in diameter.  REPRODUCTIVE ORGANS: Unremarkable.  URINARY BLADDER: Unremarkable    VASCULAR: Unremarkable  LYMPH NODES: There is a group of slightly enlarged mesenteric  lymph nodes with the largest measuring 1.4 x 2 cm in the  midabdomen just to the right of midline, with surrounding fat  stranding in the mesentery, suspicious for mesenteric  panniculitis or less likely neoplasm such as lymphoma. Findings  are similar to the prior exam.  PERITONEUM/RETROPERITONEUM: Unremarkable.     OSSEOUS STRUCTURES: Unremarkable.        Impression:       CONCLUSION:   1.  Dilated loops of left upper quadrant small bowel with  air-fluid levels.  2.  Bowel wall thickening and hypervascular mesentery in the  right lower quadrant jejunum.  3.  Slightly enlarged mesenteric lymph nodes in the right  midabdomen with surrounding inflammation and hypervascularity.  4.  This constellation of findings suggests the possibility of  Crohn's disease.  5.  Given the lymph node enlargement and surrounding fat  stranding in the mesentery, a neoplastic process such as lymphoma  although less likely could also be considered.  6.  Incidental note is made of a Meckel's diverticulum arising  from the distal ileum in the right lower quadrant. The  diverticulum measures approximately 2.8 cm in length and 1.8 cm  in diameter.    Electronically signed by:  Joey Ma MD  9/19/2019 4:16 PM CDT  Workstation: [id number]    XR Abdomen Flat & Upright [[id number]] Collected:  09/19/19 1448     Updated:  09/19/19 1459    Narrative:       PROCEDURE: Two-view abdomen    COMPARISON: None    HISTORY: abdominal pain    TECHNIQUE: Upright  "and supine views of the abdomen are obtained.     FINDINGS:  Surgical clips in the right upper quadrant.   Multiple prominent loops of bowel in the upper abdomen are  nonspecific, cannot exclude small bowel obstruction.  Calcifications in the pelvis are likely phleboliths similar to  the prior study.      Impression:       CONCLUSION:    Multiple prominent loops of bowel in the upper abdomen are  nonspecific, cannot exclude small bowel obstruction.    Electronically signed by:  Joey Ma MD  9/19/2019 2:58 PM CDT  Workstation: [id number]            Chief Complaint on Day of Discharge: None.    Hospital Course:  The patient was admitted for possible infectious colitis versus inflammatory bowel disease and started on IV fluids, steroids and antibiotics.  She became less symptomatic and was able to tolerate regular diet.  She was seen by the gastroenterologist and cleared to be discharged.  She is to be discharged with oral steroids and Flagyl.  She will be scheduled to follow-up with Dr. Ferraro in 1 week.  Biopsy results of recently performed colonoscopy are pending and will be followed up by the gastroenterologist as outpatient.    Condition on Discharge: Stable and improved.    Physical Exam on Discharge:  /73 (BP Location: Right arm, Patient Position: Sitting)   Pulse 82   Temp 97.2 °F (36.2 °C) (Oral)   Resp 18   Ht 162.6 cm (64\")   Wt 90.7 kg (200 lb)   LMP 08/25/2019 (Approximate) Comment: when started  SpO2 97%   BMI 34.33 kg/m²   Physical Exam   Constitutional: She is oriented to person, place, and time. She appears well-developed and well-nourished. She is cooperative. No distress.   HENT:   Head: Normocephalic and atraumatic.   Right Ear: External ear normal.   Left Ear: External ear normal.   Nose: Nose normal.   Mouth/Throat: Oropharynx is clear and moist.   Eyes: Conjunctivae and EOM are normal. Pupils are equal, round, and reactive to light.   Neck: Normal range of motion. Neck supple. No " JVD present. No thyromegaly present.   Cardiovascular: Normal rate, regular rhythm, normal heart sounds and intact distal pulses. Exam reveals no gallop and no friction rub.   No murmur heard.  Pulmonary/Chest: Effort normal and breath sounds normal. No stridor. No respiratory distress. She has no wheezes. She has no rales. She exhibits no tenderness.   Abdominal: Soft. Bowel sounds are normal. She exhibits no distension and no mass. There is no tenderness. There is no rebound and no guarding. No hernia.   Musculoskeletal: Normal range of motion. She exhibits no edema, tenderness or deformity.   Neurological: She is alert and oriented to person, place, and time. She has normal reflexes. No cranial nerve deficit or sensory deficit. She exhibits normal muscle tone. Coordination normal.   Skin: Skin is warm and dry. No rash noted. She is not diaphoretic. No erythema. No pallor.   Psychiatric: She has a normal mood and affect. Her behavior is normal. Judgment and thought content normal.   Nursing note and vitals reviewed.        Discharge Disposition:  Home or Self Care    Discharge Medications:     Discharge Medications      New Medications      Instructions Start Date   fluconazole 200 MG tablet  Commonly known as:  DIFLUCAN   200 mg, Oral, Daily      metroNIDAZOLE 500 MG tablet  Commonly known as:  FLAGYL   500 mg, Oral, 3 Times Daily      nicotine 21 MG/24HR patch  Commonly known as:  NICODERM CQ   1 patch, Transdermal, Every 24 Hours Scheduled      predniSONE 20 MG tablet  Commonly known as:  DELTASONE   40 mg, Oral, Daily         Continue These Medications      Instructions Start Date   omeprazole 40 MG capsule  Commonly known as:  priLOSEC   40 mg, Oral, Daily             Discharge Diet:   Diet Instructions     As tolerated                 Activity at Discharge:   Activity Instructions     As tolerated                 Discharge Care Plan/Instructions: Patient has been advised to take her medications as prescribed  and to the emergency room in the event of any worsening symptoms.    Follow-up Appointments:   Future Appointments   Date Time Provider Department Center   9/25/2019  9:00 AM Maria Elena De La Cruz APRN [facility] None   9/26/2019  9:00 AM Nicholas Maldonado MD [facility] None       Test Results Pending at Discharge:     Haris Morin MD  09/20/19  11:58 AM    Time: 35 minutes.

## 2019-09-25 ENCOUNTER — LAB (OUTPATIENT)
Dept: LAB | Facility: HOSPITAL | Age: 44
End: 2019-09-25

## 2019-09-25 ENCOUNTER — APPOINTMENT (OUTPATIENT)
Dept: LAB | Facility: HOSPITAL | Age: 44
End: 2019-09-25

## 2019-09-25 ENCOUNTER — OFFICE VISIT (OUTPATIENT)
Dept: GASTROENTEROLOGY | Facility: CLINIC | Age: 44
End: 2019-09-25

## 2019-09-25 VITALS
HEIGHT: 64 IN | WEIGHT: 212 LBS | BODY MASS INDEX: 36.19 KG/M2 | SYSTOLIC BLOOD PRESSURE: 140 MMHG | DIASTOLIC BLOOD PRESSURE: 82 MMHG | HEART RATE: 84 BPM

## 2019-09-25 DIAGNOSIS — R19.4 CHANGE IN BOWEL HABITS: ICD-10-CM

## 2019-09-25 DIAGNOSIS — K21.00 GASTROESOPHAGEAL REFLUX DISEASE WITH ESOPHAGITIS: ICD-10-CM

## 2019-09-25 DIAGNOSIS — K50.018: ICD-10-CM

## 2019-09-25 DIAGNOSIS — R10.84 GENERALIZED ABDOMINAL PAIN: ICD-10-CM

## 2019-09-25 DIAGNOSIS — R10.84 GENERALIZED ABDOMINAL PAIN: Primary | ICD-10-CM

## 2019-09-25 DIAGNOSIS — R93.3 ABNORMAL CT SCAN, SMALL BOWEL: ICD-10-CM

## 2019-09-25 DIAGNOSIS — K29.50 CHRONIC GASTRITIS WITHOUT BLEEDING, UNSPECIFIED GASTRITIS TYPE: ICD-10-CM

## 2019-09-25 LAB
ALBUMIN SERPL-MCNC: 4 G/DL (ref 3.5–5.2)
ALBUMIN/GLOB SERPL: 1.7 G/DL
ALP SERPL-CCNC: 65 U/L (ref 39–117)
ALT SERPL W P-5'-P-CCNC: 42 U/L (ref 1–33)
ANION GAP SERPL CALCULATED.3IONS-SCNC: 10.8 MMOL/L (ref 5–15)
AST SERPL-CCNC: 18 U/L (ref 1–32)
BASOPHILS # BLD AUTO: 0.11 10*3/MM3 (ref 0–0.2)
BASOPHILS NFR BLD AUTO: 0.5 % (ref 0–1.5)
BILIRUB SERPL-MCNC: <0.2 MG/DL (ref 0.2–1.2)
BUN BLD-MCNC: 14 MG/DL (ref 6–20)
BUN/CREAT SERPL: 17.3 (ref 7–25)
CALCIUM SPEC-SCNC: 9.1 MG/DL (ref 8.6–10.5)
CHLORIDE SERPL-SCNC: 104 MMOL/L (ref 98–107)
CO2 SERPL-SCNC: 25.2 MMOL/L (ref 22–29)
CREAT BLD-MCNC: 0.81 MG/DL (ref 0.57–1)
DEPRECATED RDW RBC AUTO: 44.8 FL (ref 37–54)
EOSINOPHIL # BLD AUTO: 0.04 10*3/MM3 (ref 0–0.4)
EOSINOPHIL NFR BLD AUTO: 0.2 % (ref 0.3–6.2)
ERYTHROCYTE [DISTWIDTH] IN BLOOD BY AUTOMATED COUNT: 15.3 % (ref 12.3–15.4)
GFR SERPL CREATININE-BSD FRML MDRD: 77 ML/MIN/1.73
GLOBULIN UR ELPH-MCNC: 2.4 GM/DL
GLUCOSE BLD-MCNC: 110 MG/DL (ref 65–99)
HCT VFR BLD AUTO: 36.9 % (ref 34–46.6)
HGB BLD-MCNC: 11.7 G/DL (ref 12–15.9)
IMM GRANULOCYTES # BLD AUTO: 0.29 10*3/MM3 (ref 0–0.05)
IMM GRANULOCYTES NFR BLD AUTO: 1.3 % (ref 0–0.5)
LYMPHOCYTES # BLD AUTO: 3.03 10*3/MM3 (ref 0.7–3.1)
LYMPHOCYTES NFR BLD AUTO: 14 % (ref 19.6–45.3)
MCH RBC QN AUTO: 25.7 PG (ref 26.6–33)
MCHC RBC AUTO-ENTMCNC: 31.7 G/DL (ref 31.5–35.7)
MCV RBC AUTO: 80.9 FL (ref 79–97)
MONOCYTES # BLD AUTO: 0.99 10*3/MM3 (ref 0.1–0.9)
MONOCYTES NFR BLD AUTO: 4.6 % (ref 5–12)
NEUTROPHILS # BLD AUTO: 17.24 10*3/MM3 (ref 1.7–7)
NEUTROPHILS NFR BLD AUTO: 79.4 % (ref 42.7–76)
NRBC BLD AUTO-RTO: 0 /100 WBC (ref 0–0.2)
PLATELET # BLD AUTO: 508 10*3/MM3 (ref 140–450)
PMV BLD AUTO: 10.8 FL (ref 6–12)
POTASSIUM BLD-SCNC: 3.6 MMOL/L (ref 3.5–5.2)
PROT SERPL-MCNC: 6.4 G/DL (ref 6–8.5)
RBC # BLD AUTO: 4.56 10*6/MM3 (ref 3.77–5.28)
SODIUM BLD-SCNC: 140 MMOL/L (ref 136–145)
WBC NRBC COR # BLD: 21.7 10*3/MM3 (ref 3.4–10.8)

## 2019-09-25 PROCEDURE — 83516 IMMUNOASSAY NONANTIBODY: CPT

## 2019-09-25 PROCEDURE — 99214 OFFICE O/P EST MOD 30 MIN: CPT | Performed by: NURSE PRACTITIONER

## 2019-09-25 PROCEDURE — 80053 COMPREHEN METABOLIC PANEL: CPT | Performed by: NURSE PRACTITIONER

## 2019-09-25 PROCEDURE — 86255 FLUORESCENT ANTIBODY SCREEN: CPT

## 2019-09-25 PROCEDURE — 85025 COMPLETE CBC W/AUTO DIFF WBC: CPT | Performed by: NURSE PRACTITIONER

## 2019-09-25 PROCEDURE — 36415 COLL VENOUS BLD VENIPUNCTURE: CPT | Performed by: NURSE PRACTITIONER

## 2019-09-25 PROCEDURE — 86671 FUNGUS NES ANTIBODY: CPT

## 2019-09-25 PROCEDURE — 83993 ASSAY FOR CALPROTECTIN FECAL: CPT | Performed by: NURSE PRACTITIONER

## 2019-09-25 NOTE — PATIENT INSTRUCTIONS

## 2019-09-25 NOTE — PROGRESS NOTES
Chief Complaint   Patient presents with   • Abdominal Pain   • Irritable Bowel Syndrome       Subjective    Suzi Heck is a 44 y.o. female. she is here today for follow-up.    History of Present Illness  44-year-old female presents to discuss hospital discharge abdominal pain changes in bowel habits and abnormal CT scan.  Reports bowel habits have been 10 to 20/day depending on intake and now she is more constipated 1 to 2/day denies any melena or hematochezia.  She was hospitalized and CT noted dilated loops of small bowel bowel wall thickening enlarged mesenteric lymph nodes suggestive of Crohn's disease incidental note is made of a Meckel's diverticula arising from the distal ileum in the right lower quadrant measuring 2.8 cm x 1.8 cm in diameter.  EGD noted mildly severe esophagitis, gastritis and normal duodenum.  Antrum biopsy noted reactive gastropathy.  Distal esophagus biopsy noted reactive changes comes mucosa.  Colonoscopy noted multiple ulcers in the terminal ileum, external and internal hemorrhoids were seen.  Biopsy of terminal ileum and random colonic biopsy noted no significant histologic abnormality.  Patient remains on antibiotic and steroid taper.  Reports she started her period yesterday pain is been tolerable but now is very severe in the right lower quadrant.  Plan; will obtain CBC, CMP, IBD panel obtain MR enterography I discussed with patient this is likely Crohn's disease but would like her to finish her antibiotic and steroid taper.     The following portions of the patient's history were reviewed and updated as appropriate:   Past Medical History:   Diagnosis Date   • Abdominal pain     right lower quadrant      • Allergy     seasonal   • Conjunctivitis    • Encounter for routine gynecological examination    • Gastroesophageal reflux disease    • Irritable bowel syndrome with diarrhea    • Pigmented skin lesion    • PONV (postoperative nausea and vomiting)    • Screening  examination for venereal disease    • Viral gastroenteritis      Past Surgical History:   Procedure Laterality Date   •  SECTION  2007    (1) - Primary low transverse C section   • CHOLECYSTECTOMY  2005   • COLONOSCOPY N/A 2019    Procedure: COLONOSCOPY;  Surgeon: Myles Ferraro MD;  Location: [facility] ENDOSCOPY;  Service: Gastroenterology   • ENDOSCOPY N/A 2019    Procedure: ESOPHAGOGASTRODUODENOSCOPY;  Surgeon: Myles Ferraro MD;  Location: [facility] ENDOSCOPY;  Service: Gastroenterology   • INJECTION OF MEDICATION  2015    Zofran (1) - TRINA Whittington   • OTHER SURGICAL HISTORY       Foot/toes surgery procedure (1)  -  x 2   • OTHER SURGICAL HISTORY  2007    EXAM OF CERVIX W/SCOPE 09703 (1) - mild dysplasia(JOANIE I) Squamoglandular mucosa. Chronic cervicitis. Curettings, Endocervix: unremarkable columnar cell epithelium   • OTHER SURGICAL HISTORY  2016    Biopsy, Each Additional Lesion 36412 (1) - LONNIE Maldonado   • OTHER SURGICAL HISTORY  2010    Remove Impacted Cerumen 11688 (1) - DIONISIO Eaton   • PAP SMEAR  10/15/2007    (1) - Epithelial cell abnormality: squamous cells, Atypical squamouscells of undetermined significance. cannot exclude HSIL/(ASC-H)    • SKIN BIOPSY  2016    Biopsy of Skin 63216 (1) - LONNIE Maldonado   • THUMB CARPOMETACARPAL JOINT ARTHROPLASTY FLEXOR CARPI RADIALIS TENDON Right 2017    Procedure: THUMB CARPOMETACARPAL JOINT ARTHROPLASTY FLEXOR CARPI RADIALIS TENDON RELEASE  EXCISION OF GANGLLION AND RELEASE OF A1 PULLEY WRIST EXTENSOR DECREASED VEINS DISEASE;  Surgeon: Kolton Emery MD;  Location: [facility] OR;  Service:      Family History   Problem Relation Age of Onset   • Diabetes Father    • Prostate cancer Father    • Colon cancer Father      OB History      Para Term  AB Living    1 1 1     1    SAB TAB Ectopic Molar Multiple Live Births              1        Prior to Admission medications    Medication Sig Start Date End Date Taking?  "Authorizing Provider   metroNIDAZOLE (FLAGYL) 500 MG tablet Take 1 tablet by mouth 3 (Three) Times a Day for 10 days. 9/20/19 9/30/19 Yes Joey Rojas III, MD   omeprazole (priLOSEC) 40 MG capsule Take 1 capsule by mouth Daily. 9/5/19  Yes Nicholas Maldonado MD   predniSONE (DELTASONE) 20 MG tablet Take 2 tablets by mouth Daily for 10 days. 9/20/19 9/30/19 Yes Joey Rojas III, MD   nicotine (NICODERM CQ) 21 MG/24HR patch Place 1 patch on the skin as directed by provider Daily. 9/20/19   Joey Rojas III, MD     Allergies   Allergen Reactions   • Adhesive Tape Other (See Comments)     blisters   • Codeine      UNKNOWN REACTION   • Demerol [Meperidine] Mental Status Change   • Vicodin [Hydrocodone-Acetaminophen] Other (See Comments)     \"non functional\"     Social History     Socioeconomic History   • Marital status:      Spouse name: Not on file   • Number of children: Not on file   • Years of education: Not on file   • Highest education level: Not on file   Tobacco Use   • Smoking status: Current Every Day Smoker     Packs/day: 0.75     Years: 25.00     Pack years: 18.75     Types: Cigarettes     Start date: 1/17/1990   • Smokeless tobacco: Never Used   • Tobacco comment: Current Smoker -    Substance and Sexual Activity   • Alcohol use: No   • Drug use: No   • Sexual activity: Yes     Partners: Male     Birth control/protection: Surgical     Comment: Vasectomy       Review of Systems  Review of Systems   Constitutional: Positive for fatigue. Negative for activity change, appetite change, chills, diaphoresis, fever and unexpected weight change.   HENT: Negative for sore throat and trouble swallowing.    Respiratory: Negative for shortness of breath.    Gastrointestinal: Positive for abdominal pain. Negative for abdominal distention, anal bleeding, blood in stool, constipation, diarrhea, nausea, rectal pain and vomiting.   Musculoskeletal: Negative for arthralgias.   Skin: Negative for pallor. " "  Neurological: Negative for light-headedness.        /82 (BP Location: Left arm)   Pulse 84   Ht 162.6 cm (64\")   Wt 96.2 kg (212 lb)   BMI 36.39 kg/m²     Objective    Physical Exam   Constitutional: She is oriented to person, place, and time. She appears well-developed and well-nourished. She is cooperative. No distress.   HENT:   Head: Normocephalic and atraumatic.   Neck: Normal range of motion. Neck supple. No thyromegaly present.   Cardiovascular: Normal rate, regular rhythm and normal heart sounds.   Pulmonary/Chest: Effort normal and breath sounds normal. She has no wheezes. She has no rhonchi. She has no rales.   Abdominal: Soft. Normal appearance and bowel sounds are normal. She exhibits no distension. There is no hepatosplenomegaly. There is tenderness in the right lower quadrant. There is no rigidity and no guarding. No hernia.   Lymphadenopathy:     She has no cervical adenopathy.   Neurological: She is alert and oriented to person, place, and time.   Skin: Skin is warm, dry and intact. No rash noted. No pallor.   Psychiatric: She has a normal mood and affect. Her speech is normal.     Admission on 09/19/2019, Discharged on 09/20/2019   Component Date Value Ref Range Status   • Glucose 09/19/2019 126* 65 - 99 mg/dL Final   • BUN 09/19/2019 16  6 - 20 mg/dL Final   • Creatinine 09/19/2019 1.16* 0.57 - 1.00 mg/dL Final   • Sodium 09/19/2019 135* 136 - 145 mmol/L Final   • Potassium 09/19/2019 3.4* 3.5 - 5.2 mmol/L Final   • Chloride 09/19/2019 98  98 - 107 mmol/L Final   • CO2 09/19/2019 20.0* 22.0 - 29.0 mmol/L Final   • Calcium 09/19/2019 9.5  8.6 - 10.5 mg/dL Final   • Total Protein 09/19/2019 7.8  6.0 - 8.5 g/dL Final   • Albumin 09/19/2019 4.20  3.50 - 5.20 g/dL Final   • ALT (SGPT) 09/19/2019 11  1 - 33 U/L Final   • AST (SGOT) 09/19/2019 12  1 - 32 U/L Final   • Alkaline Phosphatase 09/19/2019 77  39 - 117 U/L Final   • Total Bilirubin 09/19/2019 0.4  0.2 - 1.2 mg/dL Final   • eGFR Non "  Amer 09/19/2019 51* >60 mL/min/1.73 Final   • Globulin 09/19/2019 3.6  gm/dL Final   • A/G Ratio 09/19/2019 1.2  g/dL Final   • BUN/Creatinine Ratio 09/19/2019 13.8  7.0 - 25.0 Final   • Anion Gap 09/19/2019 17.0* 5.0 - 15.0 mmol/L Final   • Lipase 09/19/2019 33  13 - 60 U/L Final   • Color, UA 09/19/2019 Dark Yellow  Yellow, Straw, Dark Yellow, Luh Final   • Appearance, UA 09/19/2019 Cloudy* Clear Final   • pH, UA 09/19/2019 <=5.0  5.0 - 9.0 Final   • Specific Gravity, UA 09/19/2019 1.037* 1.003 - 1.030 Final    BY REFRACTOMETER   • Glucose, UA 09/19/2019 Negative  Negative Final   • Ketones, UA 09/19/2019 15 mg/dL (1+)* Negative Final   • Bilirubin, UA 09/19/2019 Large (3+)* Negative Final   • Blood, UA 09/19/2019 Small (1+)* Negative Final   • Protein, UA 09/19/2019 100 mg/dL (2+)* Negative Final   • Leuk Esterase, UA 09/19/2019 Negative  Negative Final   • Nitrite, UA 09/19/2019 Negative  Negative Final   • Urobilinogen, UA 09/19/2019 1.0 E.U./dL  0.2 - 1.0 E.U./dL Final   • HCG Qualitative 09/19/2019 Negative  Negative Final   • Extra Tube 09/19/2019 hold for add-on   Final    Auto resulted   • Extra Tube 09/19/2019 Hold for add-ons.   Final    Auto resulted.   • Extra Tube 09/19/2019 hold for add-on   Final    Auto resulted   • WBC 09/19/2019 21.92* 3.40 - 10.80 10*3/mm3 Final   • RBC 09/19/2019 5.43* 3.77 - 5.28 10*6/mm3 Final   • Hemoglobin 09/19/2019 14.0  12.0 - 15.9 g/dL Final   • Hematocrit 09/19/2019 42.1  34.0 - 46.6 % Final   • MCV 09/19/2019 77.5* 79.0 - 97.0 fL Final   • MCH 09/19/2019 25.8* 26.6 - 33.0 pg Final   • MCHC 09/19/2019 33.3  31.5 - 35.7 g/dL Final   • RDW 09/19/2019 15.7* 12.3 - 15.4 % Final   • RDW-SD 09/19/2019 43.4  37.0 - 54.0 fl Final   • MPV 09/19/2019 9.4  6.0 - 12.0 fL Final   • Platelets 09/19/2019 499* 140 - 450 10*3/mm3 Final   • Neutrophil % 09/19/2019 78.5* 42.7 - 76.0 % Final   • Lymphocyte % 09/19/2019 11.5* 19.6 - 45.3 % Final   • Monocyte % 09/19/2019 8.8  5.0  - 12.0 % Final   • Eosinophil % 09/19/2019 0.5  0.3 - 6.2 % Final   • Basophil % 09/19/2019 0.2  0.0 - 1.5 % Final   • Immature Grans % 09/19/2019 0.5  0.0 - 0.5 % Final   • Neutrophils, Absolute 09/19/2019 17.19* 1.70 - 7.00 10*3/mm3 Final   • Lymphocytes, Absolute 09/19/2019 2.53  0.70 - 3.10 10*3/mm3 Final   • Monocytes, Absolute 09/19/2019 1.92* 0.10 - 0.90 10*3/mm3 Final   • Eosinophils, Absolute 09/19/2019 0.12  0.00 - 0.40 10*3/mm3 Final   • Basophils, Absolute 09/19/2019 0.05  0.00 - 0.20 10*3/mm3 Final   • Immature Grans, Absolute 09/19/2019 0.11* 0.00 - 0.05 10*3/mm3 Final   • nRBC 09/19/2019 0.0  0.0 - 0.2 /100 WBC Final   • RBC, UA 09/19/2019 6-12* None Seen /HPF Final   • WBC, UA 09/19/2019 6-12* None Seen, 0-2, 3-5 /HPF Final   • Bacteria, UA 09/19/2019 2+* None Seen /HPF Final   • Squamous Epithelial Cells, UA 09/19/2019 6-12* None Seen, 0-2 /HPF Final   • Yeast, UA 09/19/2019 Small/1+ Yeast  None Seen /HPF Final   • Hyaline Casts, UA 09/19/2019 Too Numerous to Count  None Seen /LPF Final   • Granular Casts, UA 09/19/2019 0-2  None Seen /LPF Final   • Mucus, UA 09/19/2019 Small/1+* None Seen, Trace /HPF Final   • Methodology 09/19/2019 Manual Light Microscopy   Final   • Creatine Kinase 09/19/2019 142  20 - 180 U/L Final   • Campylobacter 09/20/2019 Not Detected  Not Detected, Invalid Final   • Clostridium difficile (toxin A/B) 09/20/2019 Not Detected  Not Detected, N/A Final   • Plesiomonas shigelloides 09/20/2019 Not Detected  Not Detected Final   • Salmonella 09/20/2019 Not Detected  Not Detected Final   • Vibrio 09/20/2019 Not Detected  Not Detected Final   • Vibrio cholerae 09/20/2019 Not Detected  Not Detected Final   • Yersinia enterocolitica 09/20/2019 Not Detected  Not Detected Final   • Enteroaggregative E. coli (EAEC) 09/20/2019 Not Detected  Not Detected Final   • Enteropathogenic E. coli (EPEC) 09/20/2019 Not Detected  Not Detected Final   • Enterotoxigenic E. coli (ETEC) lt/* 09/20/2019  Not Detected  Not Detected Final   • Shiga-like toxin-producing E. coli* 09/20/2019 Not Detected  Not Detected Final   • E. coli O157 09/20/2019 Not Detected  Not Detected Final   • Shigella/Enteroinvasive E. coli (E* 09/20/2019 Not Detected  Not Detected Final   • Cryptosporidium 09/20/2019 Not Detected  Not Detected, Invalid Final   • Cyclospora cayetanensis 09/20/2019 Not Detected  Not Detected Final   • Entamoeba histolytica 09/20/2019 Not Detected  Not Detected Final   • Giardia lamblia 09/20/2019 Not Detected  Not Detected Final   • Adenovirus F40/41 09/20/2019 Not Detected  Not Detected Final   • Astrovirus 09/20/2019 Not Detected  Not Detected Final   • Norovirus GI/GII 09/20/2019 Not Detected  Not Detected Final   • Rotavirus A 09/20/2019 Not Detected  Not Detected Final   • Sapovirus (I, II, IV or V) 09/20/2019 Not Detected  Not Detected Final   • WBC 09/20/2019 14.18* 3.40 - 10.80 10*3/mm3 Final   • RBC 09/20/2019 5.03  3.77 - 5.28 10*6/mm3 Final   • Hemoglobin 09/20/2019 12.7  12.0 - 15.9 g/dL Final   • Hematocrit 09/20/2019 39.4  34.0 - 46.6 % Final   • MCV 09/20/2019 78.3* 79.0 - 97.0 fL Final   • MCH 09/20/2019 25.2* 26.6 - 33.0 pg Final   • MCHC 09/20/2019 32.2  31.5 - 35.7 g/dL Final   • RDW 09/20/2019 15.5* 12.3 - 15.4 % Final   • RDW-SD 09/20/2019 43.7  37.0 - 54.0 fl Final   • MPV 09/20/2019 9.9  6.0 - 12.0 fL Final   • Platelets 09/20/2019 406  140 - 450 10*3/mm3 Final   • Neutrophil % 09/20/2019 68.9  42.7 - 76.0 % Final   • Lymphocyte % 09/20/2019 17.9* 19.6 - 45.3 % Final   • Monocyte % 09/20/2019 10.2  5.0 - 12.0 % Final   • Eosinophil % 09/20/2019 2.2  0.3 - 6.2 % Final   • Basophil % 09/20/2019 0.4  0.0 - 1.5 % Final   • Immature Grans % 09/20/2019 0.4  0.0 - 0.5 % Final   • Neutrophils, Absolute 09/20/2019 9.77* 1.70 - 7.00 10*3/mm3 Final   • Lymphocytes, Absolute 09/20/2019 2.54  0.70 - 3.10 10*3/mm3 Final   • Monocytes, Absolute 09/20/2019 1.45* 0.10 - 0.90 10*3/mm3 Final   •  Eosinophils, Absolute 09/20/2019 0.31  0.00 - 0.40 10*3/mm3 Final   • Basophils, Absolute 09/20/2019 0.06  0.00 - 0.20 10*3/mm3 Final   • Immature Grans, Absolute 09/20/2019 0.05  0.00 - 0.05 10*3/mm3 Final   • nRBC 09/20/2019 0.0  0.0 - 0.2 /100 WBC Final   • Glucose 09/20/2019 96  65 - 99 mg/dL Final   • BUN 09/20/2019 12  6 - 20 mg/dL Final   • Creatinine 09/20/2019 1.14* 0.57 - 1.00 mg/dL Final   • Sodium 09/20/2019 134* 136 - 145 mmol/L Final   • Potassium 09/20/2019 3.4* 3.5 - 5.2 mmol/L Final   • Chloride 09/20/2019 99  98 - 107 mmol/L Final   • CO2 09/20/2019 25.0  22.0 - 29.0 mmol/L Final   • Calcium 09/20/2019 8.7  8.6 - 10.5 mg/dL Final   • eGFR Non African Amer 09/20/2019 52* >60 mL/min/1.73 Final   • BUN/Creatinine Ratio 09/20/2019 10.5  7.0 - 25.0 Final   • Anion Gap 09/20/2019 10.0  5.0 - 15.0 mmol/L Final   • Magnesium 09/20/2019 1.9  1.6 - 2.6 mg/dL Final     Assessment/Plan      1. Generalized abdominal pain    2. Change in bowel habits    3. Abnormal CT scan, small bowel    4. Gastroesophageal reflux disease with esophagitis    5. Chronic gastritis without bleeding, unspecified gastritis type    6. Terminal ileitis of small intestine, other complication (CMS/HCC)    .       Orders placed during this encounter include:  Orders Placed This Encounter   Procedures   • MRI Abdomen Pelvis Enterography     Standing Status:   Future     Standing Expiration Date:   9/25/2020     Order Specific Question:   Patient Pregnant     Answer:   No   • Comprehensive Metabolic Panel   • IBD Expanded Panel     Standing Status:   Future     Number of Occurrences:   1     Standing Expiration Date:   9/25/2020   • Calprotectin, Fecal - Stool, Per Rectum   • CBC Auto Differential   • CBC & Differential     Order Specific Question:   Manual Differential     Answer:   No       * Surgery not found *    Review and/or summary of lab tests, radiology, procedures, medications. Review and summary of old records and obtaining of  history. The risks and benefits of my recommendations, as well as other treatment options were discussed with the patient today. Questions were answered.    No orders of the defined types were placed in this encounter.      Follow-up: Return in about 1 week (around 10/2/2019) for Recheck.          This document has been electronically signed by PEDRO Smith on September 25, 2019 9:58 AM             Results for orders placed or performed during the hospital encounter of 09/19/19   Gastrointestinal Panel, PCR - Stool, Per Rectum   Result Value Ref Range    Campylobacter Not Detected Not Detected, Invalid    Clostridium difficile (toxin A/B) Not Detected Not Detected, N/A    Plesiomonas shigelloides Not Detected Not Detected    Salmonella Not Detected Not Detected    Vibrio Not Detected Not Detected    Vibrio cholerae Not Detected Not Detected    Yersinia enterocolitica Not Detected Not Detected    Enteroaggregative E. coli (EAEC) Not Detected Not Detected    Enteropathogenic E. coli (EPEC) Not Detected Not Detected    Enterotoxigenic E. coli (ETEC) lt/st Not Detected Not Detected    Shiga-like toxin-producing E. coli (STEC) stx1/stx2 Not Detected Not Detected    E. coli O157 Not Detected Not Detected    Shigella/Enteroinvasive E. coli (EIEC) Not Detected Not Detected    Cryptosporidium Not Detected Not Detected, Invalid    Cyclospora cayetanensis Not Detected Not Detected    Entamoeba histolytica Not Detected Not Detected    Giardia lamblia Not Detected Not Detected    Adenovirus F40/41 Not Detected Not Detected    Astrovirus Not Detected Not Detected    Norovirus GI/GII Not Detected Not Detected    Rotavirus A Not Detected Not Detected    Sapovirus (I, II, IV or V) Not Detected Not Detected   Green Top (Gel)   Result Value Ref Range    Extra Tube Hold for add-ons.    Urinalysis, Microscopic Only - Urine, Clean Catch   Result Value Ref Range    RBC, UA 6-12 (A) None Seen /HPF    WBC, UA 6-12 (A) None Seen, 0-2,  3-5 /HPF    Bacteria, UA 2+ (A) None Seen /HPF    Squamous Epithelial Cells, UA 6-12 (A) None Seen, 0-2 /HPF    Yeast, UA Small/1+ Yeast None Seen /HPF    Hyaline Casts, UA Too Numerous to Count None Seen /LPF    Granular Casts, UA 0-2 None Seen /LPF    Mucus, UA Small/1+ (A) None Seen, Trace /HPF    Methodology Manual Light Microscopy    Urinalysis With Microscopic If Indicated (No Culture) - Urine, Clean Catch   Result Value Ref Range    Color, UA Dark Yellow Yellow, Straw, Dark Yellow, Luh    Appearance, UA Cloudy (A) Clear    pH, UA <=5.0 5.0 - 9.0    Specific Gravity, UA 1.037 (H) 1.003 - 1.030    Glucose, UA Negative Negative    Ketones, UA 15 mg/dL (1+) (A) Negative    Bilirubin, UA Large (3+) (A) Negative    Blood, UA Small (1+) (A) Negative    Protein,  mg/dL (2+) (A) Negative    Leuk Esterase, UA Negative Negative    Nitrite, UA Negative Negative    Urobilinogen, UA 1.0 E.U./dL 0.2 - 1.0 E.U./dL   CBC Auto Differential   Result Value Ref Range    WBC 14.18 (H) 3.40 - 10.80 10*3/mm3    RBC 5.03 3.77 - 5.28 10*6/mm3    Hemoglobin 12.7 12.0 - 15.9 g/dL    Hematocrit 39.4 34.0 - 46.6 %    MCV 78.3 (L) 79.0 - 97.0 fL    MCH 25.2 (L) 26.6 - 33.0 pg    MCHC 32.2 31.5 - 35.7 g/dL    RDW 15.5 (H) 12.3 - 15.4 %    RDW-SD 43.7 37.0 - 54.0 fl    MPV 9.9 6.0 - 12.0 fL    Platelets 406 140 - 450 10*3/mm3    Neutrophil % 68.9 42.7 - 76.0 %    Lymphocyte % 17.9 (L) 19.6 - 45.3 %    Monocyte % 10.2 5.0 - 12.0 %    Eosinophil % 2.2 0.3 - 6.2 %    Basophil % 0.4 0.0 - 1.5 %    Immature Grans % 0.4 0.0 - 0.5 %    Neutrophils, Absolute 9.77 (H) 1.70 - 7.00 10*3/mm3    Lymphocytes, Absolute 2.54 0.70 - 3.10 10*3/mm3    Monocytes, Absolute 1.45 (H) 0.10 - 0.90 10*3/mm3    Eosinophils, Absolute 0.31 0.00 - 0.40 10*3/mm3    Basophils, Absolute 0.06 0.00 - 0.20 10*3/mm3    Immature Grans, Absolute 0.05 0.00 - 0.05 10*3/mm3    nRBC 0.0 0.0 - 0.2 /100 WBC   CBC Auto Differential   Result Value Ref Range    WBC 21.92 (H) 3.40  - 10.80 10*3/mm3    RBC 5.43 (H) 3.77 - 5.28 10*6/mm3    Hemoglobin 14.0 12.0 - 15.9 g/dL    Hematocrit 42.1 34.0 - 46.6 %    MCV 77.5 (L) 79.0 - 97.0 fL    MCH 25.8 (L) 26.6 - 33.0 pg    MCHC 33.3 31.5 - 35.7 g/dL    RDW 15.7 (H) 12.3 - 15.4 %    RDW-SD 43.4 37.0 - 54.0 fl    MPV 9.4 6.0 - 12.0 fL    Platelets 499 (H) 140 - 450 10*3/mm3    Neutrophil % 78.5 (H) 42.7 - 76.0 %    Lymphocyte % 11.5 (L) 19.6 - 45.3 %    Monocyte % 8.8 5.0 - 12.0 %    Eosinophil % 0.5 0.3 - 6.2 %    Basophil % 0.2 0.0 - 1.5 %    Immature Grans % 0.5 0.0 - 0.5 %    Neutrophils, Absolute 17.19 (H) 1.70 - 7.00 10*3/mm3    Lymphocytes, Absolute 2.53 0.70 - 3.10 10*3/mm3    Monocytes, Absolute 1.92 (H) 0.10 - 0.90 10*3/mm3    Eosinophils, Absolute 0.12 0.00 - 0.40 10*3/mm3    Basophils, Absolute 0.05 0.00 - 0.20 10*3/mm3    Immature Grans, Absolute 0.11 (H) 0.00 - 0.05 10*3/mm3    nRBC 0.0 0.0 - 0.2 /100 WBC   Lavender Top   Result Value Ref Range    Extra Tube hold for add-on    Light Blue Top   Result Value Ref Range    Extra Tube hold for add-on    hCG, Serum, Qualitative   Result Value Ref Range    HCG Qualitative Negative Negative   Magnesium   Result Value Ref Range    Magnesium 1.9 1.6 - 2.6 mg/dL   Lipase   Result Value Ref Range    Lipase 33 13 - 60 U/L   CK   Result Value Ref Range    Creatine Kinase 142 20 - 180 U/L   Comprehensive Metabolic Panel   Result Value Ref Range    Glucose 126 (H) 65 - 99 mg/dL    BUN 16 6 - 20 mg/dL    Creatinine 1.16 (H) 0.57 - 1.00 mg/dL    Sodium 135 (L) 136 - 145 mmol/L    Potassium 3.4 (L) 3.5 - 5.2 mmol/L    Chloride 98 98 - 107 mmol/L    CO2 20.0 (L) 22.0 - 29.0 mmol/L    Calcium 9.5 8.6 - 10.5 mg/dL    Total Protein 7.8 6.0 - 8.5 g/dL    Albumin 4.20 3.50 - 5.20 g/dL    ALT (SGPT) 11 1 - 33 U/L    AST (SGOT) 12 1 - 32 U/L    Alkaline Phosphatase 77 39 - 117 U/L    Total Bilirubin 0.4 0.2 - 1.2 mg/dL    eGFR Non African Amer 51 (L) >60 mL/min/1.73    Globulin 3.6 gm/dL    A/G Ratio 1.2 g/dL     BUN/Creatinine Ratio 13.8 7.0 - 25.0    Anion Gap 17.0 (H) 5.0 - 15.0 mmol/L     *Note: Due to a large number of results and/or encounters for the requested time period, some results have not been displayed. A complete set of results can be found in Results Review.

## 2019-09-26 ENCOUNTER — OFFICE VISIT (OUTPATIENT)
Dept: FAMILY MEDICINE CLINIC | Facility: CLINIC | Age: 44
End: 2019-09-26

## 2019-09-26 VITALS
TEMPERATURE: 98.2 F | OXYGEN SATURATION: 100 % | DIASTOLIC BLOOD PRESSURE: 82 MMHG | WEIGHT: 213.8 LBS | HEART RATE: 89 BPM | BODY MASS INDEX: 36.5 KG/M2 | RESPIRATION RATE: 16 BRPM | HEIGHT: 64 IN | SYSTOLIC BLOOD PRESSURE: 128 MMHG

## 2019-09-26 DIAGNOSIS — R10.31 RIGHT LOWER QUADRANT ABDOMINAL PAIN: Primary | ICD-10-CM

## 2019-09-26 LAB — CALPROTECTIN STL-MCNT: 305 UG/G (ref 0–120)

## 2019-09-26 PROCEDURE — 99213 OFFICE O/P EST LOW 20 MIN: CPT | Performed by: FAMILY MEDICINE

## 2019-09-26 RX ORDER — MESALAMINE 0.38 G/1
1500 CAPSULE, EXTENDED RELEASE ORAL DAILY
Qty: 120 CAPSULE | Refills: 1 | Status: SHIPPED | OUTPATIENT
Start: 2019-09-26 | End: 2019-10-22 | Stop reason: SDUPTHER

## 2019-09-26 RX ORDER — GABAPENTIN 600 MG/1
300-600 TABLET ORAL 3 TIMES DAILY
Qty: 90 TABLET | Refills: 0 | Status: SHIPPED | OUTPATIENT
Start: 2019-09-26 | End: 2019-10-22

## 2019-09-26 RX ORDER — LEVOFLOXACIN 500 MG/1
500 TABLET, FILM COATED ORAL DAILY
Qty: 7 TABLET | Refills: 0 | Status: SHIPPED | OUTPATIENT
Start: 2019-09-26 | End: 2019-10-22

## 2019-09-26 NOTE — PROGRESS NOTES
" Subjective   Suzi Heck is a 44 y.o. female.     History of Present Illness     conitinued abdominal pain.  Uses words like feels like fire.   To have mri abd/pelvis  Had a meckels diverticulum ileum.   Pain is just right of center lower abdomen  Can't lay down flat    Review of Systems   Constitutional: Negative for chills, fatigue and fever.   HENT: Negative for congestion, ear discharge, ear pain, facial swelling, hearing loss, postnasal drip, rhinorrhea, sinus pressure, sore throat, trouble swallowing and voice change.    Eyes: Negative for discharge, redness and visual disturbance.   Respiratory: Negative for cough, chest tightness, shortness of breath and wheezing.    Cardiovascular: Negative for chest pain and palpitations.   Gastrointestinal: Positive for abdominal pain. Negative for blood in stool, constipation, diarrhea, nausea and vomiting.   Endocrine: Negative for polydipsia and polyuria.   Genitourinary: Negative for dysuria, flank pain, hematuria and urgency.   Musculoskeletal: Negative for arthralgias, back pain, joint swelling and myalgias.   Skin: Negative for rash.   Neurological: Negative for dizziness, weakness, numbness and headaches.   Hematological: Negative for adenopathy.   Psychiatric/Behavioral: Negative for confusion and sleep disturbance. The patient is not nervous/anxious.            /82   Pulse 89   Temp 98.2 °F (36.8 °C) (Temporal)   Resp 16   Ht 162.6 cm (64\")   Wt 97 kg (213 lb 12.8 oz)   SpO2 100%   BMI 36.70 kg/m²       Objective     Physical Exam   Constitutional: She is oriented to person, place, and time. She appears well-developed and well-nourished.   HENT:   Head: Normocephalic and atraumatic.   Right Ear: External ear normal.   Left Ear: External ear normal.   Nose: Nose normal.   Eyes: Conjunctivae and EOM are normal. Pupils are equal, round, and reactive to light.   Neck: Normal range of motion.   Pulmonary/Chest: Effort normal.   Musculoskeletal: " Normal range of motion.   Neurological: She is alert and oriented to person, place, and time.   Psychiatric: She has a normal mood and affect. Her behavior is normal. Judgment and thought content normal.   Nursing note and vitals reviewed.          PAST MEDICAL HISTORY     Past Medical History:   Diagnosis Date   • Abdominal pain     right lower quadrant      • Allergy     seasonal   • Conjunctivitis    • Encounter for routine gynecological examination    • Gastroesophageal reflux disease    • Irritable bowel syndrome with diarrhea    • Pigmented skin lesion    • PONV (postoperative nausea and vomiting)    • Screening examination for venereal disease    • Viral gastroenteritis       PAST SURGICAL HISTORY     Past Surgical History:   Procedure Laterality Date   •  SECTION  2007    (1) - Primary low transverse C section   • CHOLECYSTECTOMY     • COLONOSCOPY N/A 2019    Procedure: COLONOSCOPY;  Surgeon: Myles Ferraro MD;  Location: [facility] ENDOSCOPY;  Service: Gastroenterology   • ENDOSCOPY N/A 2019    Procedure: ESOPHAGOGASTRODUODENOSCOPY;  Surgeon: Myles Ferraro MD;  Location: [facility] ENDOSCOPY;  Service: Gastroenterology   • INJECTION OF MEDICATION  2015    Zofran (1) - TRINA Whittington   • OTHER SURGICAL HISTORY       Foot/toes surgery procedure (1)  -  x 2   • OTHER SURGICAL HISTORY  2007    EXAM OF CERVIX W/SCOPE 34159 (1) - mild dysplasia(JOANIE I) Squamoglandular mucosa. Chronic cervicitis. Curettings, Endocervix: unremarkable columnar cell epithelium   • OTHER SURGICAL HISTORY  2016    Biopsy, Each Additional Lesion 61367 (1) Ralph Maldonado   • OTHER SURGICAL HISTORY  2010    Remove Impacted Cerumen 32586 (1) - DIONISIO Eaton   • PAP SMEAR  10/15/2007    (1) - Epithelial cell abnormality: squamous cells, Atypical squamouscells of undetermined significance. cannot exclude HSIL/(ASC-H)    • SKIN BIOPSY  2016    Biopsy of Skin 71743 (1) Ralph Maldonado   • THUMB CARPOMETACARPAL  JOINT ARTHROPLASTY FLEXOR CARPI RADIALIS TENDON Right 4/13/2017    Procedure: THUMB CARPOMETACARPAL JOINT ARTHROPLASTY FLEXOR CARPI RADIALIS TENDON RELEASE  EXCISION OF GANGLLION AND RELEASE OF A1 PULLEY WRIST EXTENSOR DECREASED VEINS DISEASE;  Surgeon: Kolton Emery MD;  Location: [facility];  Service:       SOCIAL HISTORY     Social History     Socioeconomic History   • Marital status:      Spouse name: Not on file   • Number of children: Not on file   • Years of education: Not on file   • Highest education level: Not on file   Tobacco Use   • Smoking status: Current Every Day Smoker     Packs/day: 0.75     Years: 25.00     Pack years: 18.75     Types: Cigarettes     Start date: 1/17/1990   • Smokeless tobacco: Never Used   • Tobacco comment: Current Smoker -    Substance and Sexual Activity   • Alcohol use: No   • Drug use: No   • Sexual activity: Yes     Partners: Male     Birth control/protection: Surgical     Comment: Vasectomy      ALLERGIES   Adhesive tape; Codeine; Demerol [meperidine]; and Vicodin [hydrocodone-acetaminophen]   MEDICATIONS     Current Outpatient Medications   Medication Sig Dispense Refill   • metroNIDAZOLE (FLAGYL) 500 MG tablet Take 1 tablet by mouth 3 (Three) Times a Day for 10 days. 30 tablet 0   • nicotine (NICODERM CQ) 21 MG/24HR patch Place 1 patch on the skin as directed by provider Daily. 28 each 1   • omeprazole (priLOSEC) 40 MG capsule Take 1 capsule by mouth Daily. 90 capsule 3   • predniSONE (DELTASONE) 20 MG tablet Take 2 tablets by mouth Daily for 10 days. 20 tablet 0   • gabapentin (NEURONTIN) 600 MG tablet Take 0.5-1 tablets by mouth 3 (Three) Times a Day. 90 tablet 0     No current facility-administered medications for this visit.         The following portions of the patient's history were reviewed and updated as appropriate: allergies, current medications, past family history, past medical history, past social history, past surgical history and problem  "list.        Assessment/Plan   Suzi was seen today for follow-up and abdominal pain.    Diagnoses and all orders for this visit:    Right lower quadrant abdominal pain    Other orders  -     gabapentin (NEURONTIN) 600 MG tablet; Take 0.5-1 tablets by mouth 3 (Three) Times a Day.      Since described as \"burning\" pain, try neurontin for relief.  Consider trying antispasmotic infuture.     Mri good plan                 No Follow-up on file.                  This document has been electronically signed by Nicholas Maldonado MD on September 26, 2019 12:33 PM     "

## 2019-09-27 ENCOUNTER — TELEPHONE (OUTPATIENT)
Dept: GASTROENTEROLOGY | Facility: CLINIC | Age: 44
End: 2019-09-27

## 2019-09-27 NOTE — TELEPHONE ENCOUNTER
Contacted patient with results. Patient voiced understanding.      ----- Message from PEDRO Menendez sent at 9/26/2019  5:01 PM CDT -----  Please call patient with results. WBC elevated and fecal calprotectin elevated (means IBD very active) Apriso sent in and awaiting IBD panel results.

## 2019-10-01 ENCOUNTER — TELEPHONE (OUTPATIENT)
Dept: GASTROENTEROLOGY | Facility: CLINIC | Age: 44
End: 2019-10-01

## 2019-10-01 ENCOUNTER — HOSPITAL ENCOUNTER (OUTPATIENT)
Dept: MRI IMAGING | Facility: HOSPITAL | Age: 44
Discharge: HOME | End: 2019-10-01
Admitting: NURSE PRACTITIONER

## 2019-10-01 ENCOUNTER — HOSPITAL ENCOUNTER (EMERGENCY)
Facility: HOSPITAL | Age: 44
Discharge: HOME | End: 2019-10-01
Attending: FAMILY MEDICINE | Admitting: EMERGENCY MEDICINE

## 2019-10-01 VITALS
TEMPERATURE: 97.9 F | SYSTOLIC BLOOD PRESSURE: 114 MMHG | WEIGHT: 223 LBS | OXYGEN SATURATION: 95 % | HEIGHT: 64 IN | RESPIRATION RATE: 18 BRPM | BODY MASS INDEX: 38.07 KG/M2 | DIASTOLIC BLOOD PRESSURE: 71 MMHG | HEART RATE: 71 BPM

## 2019-10-01 DIAGNOSIS — R10.31 RIGHT LOWER QUADRANT ABDOMINAL PAIN: Primary | ICD-10-CM

## 2019-10-01 DIAGNOSIS — R10.84 GENERALIZED ABDOMINAL PAIN: ICD-10-CM

## 2019-10-01 DIAGNOSIS — R19.7 DIARRHEA, UNSPECIFIED TYPE: ICD-10-CM

## 2019-10-01 DIAGNOSIS — R19.4 CHANGE IN BOWEL HABITS: ICD-10-CM

## 2019-10-01 LAB
ALBUMIN SERPL-MCNC: 3.1 G/DL (ref 3.5–5.2)
ALBUMIN/GLOB SERPL: 1.3 G/DL
ALP SERPL-CCNC: 45 U/L (ref 39–117)
ALT SERPL W P-5'-P-CCNC: 15 U/L (ref 1–33)
ANION GAP SERPL CALCULATED.3IONS-SCNC: 8 MMOL/L (ref 5–15)
AST SERPL-CCNC: 10 U/L (ref 1–32)
BAKER'S YEAST IGG QN IA: 11 UNITS (ref 0–50)
BASOPHILS # BLD AUTO: 0.11 10*3/MM3 (ref 0–0.2)
BASOPHILS NFR BLD AUTO: 0.4 % (ref 0–1.5)
BILIRUB SERPL-MCNC: <0.2 MG/DL (ref 0.2–1.2)
BILIRUB UR QL STRIP: NEGATIVE
BUN BLD-MCNC: 13 MG/DL (ref 6–20)
BUN/CREAT SERPL: 17.3 (ref 7–25)
C DIFF TOX GENS STL QL NAA+PROBE: NEGATIVE
CALCIUM SPEC-SCNC: 8.6 MG/DL (ref 8.6–10.5)
CHITOBIOSIDE IGA SERPL IA-ACNC: 19 UNITS (ref 0–90)
CHLORIDE SERPL-SCNC: 103 MMOL/L (ref 98–107)
CLARITY UR: CLEAR
CO2 SERPL-SCNC: 30 MMOL/L (ref 22–29)
COLOR UR: YELLOW
CREAT BLD-MCNC: 0.75 MG/DL (ref 0.57–1)
D-LACTATE SERPL-SCNC: 1 MMOL/L (ref 0.5–2)
DEPRECATED RDW RBC AUTO: 46 FL (ref 37–54)
EOSINOPHIL # BLD AUTO: 0.32 10*3/MM3 (ref 0–0.4)
EOSINOPHIL NFR BLD AUTO: 1.3 % (ref 0.3–6.2)
ERYTHROCYTE [DISTWIDTH] IN BLOOD BY AUTOMATED COUNT: 16.1 % (ref 12.3–15.4)
GFR SERPL CREATININE-BSD FRML MDRD: 84 ML/MIN/1.73
GLOBULIN UR ELPH-MCNC: 2.4 GM/DL
GLUCOSE BLD-MCNC: 95 MG/DL (ref 65–99)
GLUCOSE UR STRIP-MCNC: NEGATIVE MG/DL
HCG SERPL QL: NEGATIVE
HCT VFR BLD AUTO: 34.1 % (ref 34–46.6)
HGB BLD-MCNC: 11.1 G/DL (ref 12–15.9)
HGB UR QL STRIP.AUTO: NEGATIVE
IMM GRANULOCYTES # BLD AUTO: 0.23 10*3/MM3 (ref 0–0.05)
IMM GRANULOCYTES NFR BLD AUTO: 0.9 % (ref 0–0.5)
KETONES UR QL STRIP: NEGATIVE
LABORATORY COMMENT REPORT: NORMAL
LAMINARIBIOSIDE IGG SERPL IA-ACNC: 8 UNITS (ref 0–60)
LEUKOCYTE ESTERASE UR QL STRIP.AUTO: NEGATIVE
LIPASE SERPL-CCNC: 78 U/L (ref 13–60)
LYMPHOCYTES # BLD AUTO: 7.67 10*3/MM3 (ref 0.7–3.1)
LYMPHOCYTES NFR BLD AUTO: 31 % (ref 19.6–45.3)
MANNOBIOSIDE IGG SERPL IA-ACNC: 8 UNITS (ref 0–100)
MCH RBC QN AUTO: 25.5 PG (ref 26.6–33)
MCHC RBC AUTO-ENTMCNC: 32.6 G/DL (ref 31.5–35.7)
MCV RBC AUTO: 78.2 FL (ref 79–97)
MONOCYTES # BLD AUTO: 1.64 10*3/MM3 (ref 0.1–0.9)
MONOCYTES NFR BLD AUTO: 6.6 % (ref 5–12)
NEUTROPHILS # BLD AUTO: 14.8 10*3/MM3 (ref 1.7–7)
NEUTROPHILS NFR BLD AUTO: 59.8 % (ref 42.7–76)
NITRITE UR QL STRIP: NEGATIVE
NRBC BLD AUTO-RTO: 0 /100 WBC (ref 0–0.2)
P-ANCA ATYPICAL TITR SER IF: NEGATIVE {TITER}
PH UR STRIP.AUTO: 6 [PH] (ref 5–9)
PLATELET # BLD AUTO: 501 10*3/MM3 (ref 140–450)
PMV BLD AUTO: 9.4 FL (ref 6–12)
POTASSIUM BLD-SCNC: 3.5 MMOL/L (ref 3.5–5.2)
PROT SERPL-MCNC: 5.5 G/DL (ref 6–8.5)
PROT UR QL STRIP: NEGATIVE
RBC # BLD AUTO: 4.36 10*6/MM3 (ref 3.77–5.28)
SODIUM BLD-SCNC: 141 MMOL/L (ref 136–145)
SP GR UR STRIP: 1.02 (ref 1–1.03)
UROBILINOGEN UR QL STRIP: NORMAL
WBC NRBC COR # BLD: 24.77 10*3/MM3 (ref 3.4–10.8)
WHOLE BLOOD HOLD SPECIMEN: NORMAL

## 2019-10-01 PROCEDURE — 85025 COMPLETE CBC W/AUTO DIFF WBC: CPT | Performed by: NURSE PRACTITIONER

## 2019-10-01 PROCEDURE — 25010000002 GADOTERIDOL PER 1 ML: Performed by: NURSE PRACTITIONER

## 2019-10-01 PROCEDURE — 74183 MRI ABD W/O CNTR FLWD CNTR: CPT

## 2019-10-01 PROCEDURE — 87040 BLOOD CULTURE FOR BACTERIA: CPT | Performed by: NURSE PRACTITIONER

## 2019-10-01 PROCEDURE — 96375 TX/PRO/DX INJ NEW DRUG ADDON: CPT

## 2019-10-01 PROCEDURE — 72197 MRI PELVIS W/O & W/DYE: CPT

## 2019-10-01 PROCEDURE — 99284 EMERGENCY DEPT VISIT MOD MDM: CPT

## 2019-10-01 PROCEDURE — 96365 THER/PROPH/DIAG IV INF INIT: CPT

## 2019-10-01 PROCEDURE — A9576 INJ PROHANCE MULTIPACK: HCPCS | Performed by: NURSE PRACTITIONER

## 2019-10-01 PROCEDURE — 81003 URINALYSIS AUTO W/O SCOPE: CPT | Performed by: NURSE PRACTITIONER

## 2019-10-01 PROCEDURE — 83605 ASSAY OF LACTIC ACID: CPT | Performed by: NURSE PRACTITIONER

## 2019-10-01 PROCEDURE — 87493 C DIFF AMPLIFIED PROBE: CPT | Performed by: NURSE PRACTITIONER

## 2019-10-01 PROCEDURE — 25010000002 PIPERACILLIN SOD-TAZOBACTAM PER 1 G: Performed by: NURSE PRACTITIONER

## 2019-10-01 PROCEDURE — 84703 CHORIONIC GONADOTROPIN ASSAY: CPT | Performed by: NURSE PRACTITIONER

## 2019-10-01 PROCEDURE — 83690 ASSAY OF LIPASE: CPT | Performed by: NURSE PRACTITIONER

## 2019-10-01 PROCEDURE — 25010000002 ONDANSETRON PER 1 MG: Performed by: NURSE PRACTITIONER

## 2019-10-01 PROCEDURE — 80053 COMPREHEN METABOLIC PANEL: CPT | Performed by: NURSE PRACTITIONER

## 2019-10-01 RX ORDER — METRONIDAZOLE 500 MG/1
500 TABLET ORAL 4 TIMES DAILY
Qty: 28 TABLET | Refills: 0 | Status: SHIPPED | OUTPATIENT
Start: 2019-10-01 | End: 2019-10-08

## 2019-10-01 RX ORDER — ONDANSETRON 4 MG/1
4 TABLET, ORALLY DISINTEGRATING ORAL EVERY 6 HOURS PRN
Qty: 15 TABLET | Refills: 0 | Status: SHIPPED | OUTPATIENT
Start: 2019-10-01 | End: 2019-10-22

## 2019-10-01 RX ORDER — DICYCLOMINE HYDROCHLORIDE 10 MG/1
20 CAPSULE ORAL 4 TIMES DAILY PRN
Qty: 28 CAPSULE | Refills: 0 | Status: SHIPPED | OUTPATIENT
Start: 2019-10-01 | End: 2019-10-22

## 2019-10-01 RX ORDER — ONDANSETRON 2 MG/ML
4 INJECTION INTRAMUSCULAR; INTRAVENOUS ONCE
Status: COMPLETED | OUTPATIENT
Start: 2019-10-01 | End: 2019-10-01

## 2019-10-01 RX ADMIN — PIPERACILLIN SODIUM AND TAZOBACTAM SODIUM 3.38 G: 3; .375 INJECTION, POWDER, LYOPHILIZED, FOR SOLUTION INTRAVENOUS at 21:55

## 2019-10-01 RX ADMIN — ONDANSETRON 4 MG: 2 INJECTION INTRAMUSCULAR; INTRAVENOUS at 18:50

## 2019-10-01 RX ADMIN — GADOTERIDOL 20 ML: 279.3 INJECTION, SOLUTION INTRAVENOUS at 13:07

## 2019-10-01 RX ADMIN — SODIUM CHLORIDE 1000 ML: 900 INJECTION, SOLUTION INTRAVENOUS at 18:50

## 2019-10-01 NOTE — ED NOTES
"Pt reports she has been having \"stomach problems\" for awhile now, she has a stomach infection she is currently being treated for with antibiotics. Pt reports she was admitted for dehydration and infection in her stool. Pt reports she has MRI with oral and IV contrast this afternoon, pt reports she had to fast and \"her stomach doesn't do well with that\". Pt ate after the procedure then took antibiotics and pain pills and began having nausea, diarrhea, and abdominal pain and \"knows something isn't right\". Pt reports pain was improving, but began getting worse yesterday.     Catalina Minaya, RN  10/01/19 6452    "

## 2019-10-01 NOTE — ED PROVIDER NOTES
Subjective   43 yo female w hx of chronic right lower abdominal pain and IBS who presented to ER w c/o worsening right lower abdominal pain since yesterday, associated with nausea. Pt had MRI done today, pt started to have watery diarrhea x 1 after MRI today. Pt had extensive workup recently. Had EGD and colonoscopy on 09/18. Pt was admitted to the hospital for the same symptoms on 09/19/2019. + Calprotectin. Pt had been treated with abx. Pt f/u with GI on 09/25 and PCP on 09/26/2019. neurotin was started to help with pt.  Denies hx of colon cancer. Family hx of colon cancer. Denies dysuria, urinary frequency or urgency. Denies vaginal bleeding. Denies sick contact. Denies recent travel.  Denies fever, chills, sob, cp or vomiting.            Review of Systems   Constitutional: Negative.  Negative for chills and fever.   HENT: Negative.    Eyes: Negative for photophobia, pain and visual disturbance.   Respiratory: Negative for cough, shortness of breath and wheezing.    Cardiovascular: Negative for chest pain, palpitations and leg swelling.   Gastrointestinal: Positive for abdominal pain, diarrhea and nausea. Negative for blood in stool, rectal pain and vomiting.   Genitourinary: Negative.  Negative for decreased urine volume, difficulty urinating, dysuria, frequency, hematuria, pelvic pain, urgency, vaginal discharge and vaginal pain.   Musculoskeletal: Negative.  Negative for back pain and gait problem.   Skin: Negative.    Neurological: Negative.  Negative for dizziness, weakness, light-headedness and headaches.   Psychiatric/Behavioral: Negative.  Negative for agitation and confusion.   All other systems reviewed and are negative.      Past Medical History:   Diagnosis Date   • Abdominal pain     right lower quadrant      • Allergy     seasonal   • Conjunctivitis    • Encounter for routine gynecological examination    • Gastroesophageal reflux disease    • Irritable bowel syndrome with diarrhea    • Pigmented  "skin lesion    • PONV (postoperative nausea and vomiting)    • Screening examination for venereal disease    • Viral gastroenteritis        Allergies   Allergen Reactions   • Adhesive Tape Other (See Comments)     blisters   • Codeine      UNKNOWN REACTION   • Demerol [Meperidine] Mental Status Change   • Vicodin [Hydrocodone-Acetaminophen] Other (See Comments)     \"non functional\"       Past Surgical History:   Procedure Laterality Date   •  SECTION  2007    (1) - Primary low transverse C section   • CHOLECYSTECTOMY  2005   • COLONOSCOPY N/A 2019    Procedure: COLONOSCOPY;  Surgeon: Myles Ferraro MD;  Location: [facility] ENDOSCOPY;  Service: Gastroenterology   • ENDOSCOPY N/A 2019    Procedure: ESOPHAGOGASTRODUODENOSCOPY;  Surgeon: Myles Ferraro MD;  Location: [facility] ENDOSCOPY;  Service: Gastroenterology   • INJECTION OF MEDICATION  2015    Zofran (1) - TRINA Whittington   • OTHER SURGICAL HISTORY       Foot/toes surgery procedure (1)  -  x 2   • OTHER SURGICAL HISTORY  2007    EXAM OF CERVIX W/SCOPE 26150 (1) - mild dysplasia(JOANIE I) Squamoglandular mucosa. Chronic cervicitis. Curettings, Endocervix: unremarkable columnar cell epithelium   • OTHER SURGICAL HISTORY  2016    Biopsy, Each Additional Lesion 84634 (1) - LONNIE Maldonado   • OTHER SURGICAL HISTORY  2010    Remove Impacted Cerumen 39283 (1) - DIONISIO Eaton   • PAP SMEAR  10/15/2007    (1) - Epithelial cell abnormality: squamous cells, Atypical squamouscells of undetermined significance. cannot exclude HSIL/(ASC-H)    • SKIN BIOPSY  2016    Biopsy of Skin 10191 (1) - LONNIE Maldonado   • THUMB CARPOMETACARPAL JOINT ARTHROPLASTY FLEXOR CARPI RADIALIS TENDON Right 2017    Procedure: THUMB CARPOMETACARPAL JOINT ARTHROPLASTY FLEXOR CARPI RADIALIS TENDON RELEASE  EXCISION OF GANGLLION AND RELEASE OF A1 PULLEY WRIST EXTENSOR DECREASED VEINS DISEASE;  Surgeon: Kolton Emery MD;  Location: [facility] OR;  Service:        Family History "   Problem Relation Age of Onset   • Diabetes Father    • Prostate cancer Father    • Colon cancer Father        Social History     Socioeconomic History   • Marital status:      Spouse name: Not on file   • Number of children: Not on file   • Years of education: Not on file   • Highest education level: Not on file   Tobacco Use   • Smoking status: Current Every Day Smoker     Packs/day: 0.75     Years: 25.00     Pack years: 18.75     Types: Cigarettes     Start date: 1/17/1990   • Smokeless tobacco: Never Used   • Tobacco comment: Current Smoker -    Substance and Sexual Activity   • Alcohol use: No   • Drug use: No   • Sexual activity: Yes     Partners: Male     Birth control/protection: Surgical     Comment: Vasectomy           Objective   Physical Exam   Constitutional: She is oriented to person, place, and time. No distress.   HENT:   Head: Normocephalic and atraumatic.   Eyes: EOM are normal.   Cardiovascular: Normal rate, regular rhythm, normal heart sounds and intact distal pulses.   Pulmonary/Chest: Effort normal and breath sounds normal.   Abdominal: Soft. Bowel sounds are normal. There is tenderness in the right lower quadrant and left lower quadrant. There is guarding.   Genitourinary: Vagina normal. Pelvic exam was performed with patient supine. Cervix exhibits no motion tenderness. Right adnexum displays no tenderness. Left adnexum displays no tenderness. No tenderness or bleeding in the vagina.   Genitourinary Comments: Chaperone presented. White discharge noted.    Musculoskeletal: She exhibits no tenderness or deformity.   Neurological: She is alert and oriented to person, place, and time.   Skin: Skin is warm and dry.   Psychiatric: She has a normal mood and affect. Her behavior is normal. Judgment and thought content normal.   Nursing note and vitals reviewed.      Procedures           ED Course  ED Course as of Oct 01 2130   Tue Oct 01, 2019   2119 20:15 d/w Dr. Martinez regarding pt's  condition and test results, he would come to evaluate pt in ER first.   20:40 Dr. Martinez evaluated pt in ER, recommended to call GI on call for recommendation. No indication for admission from his standpoint.    [FR]   2123 21:06 d/w  regarding pt's condition and test results, extensive workup. MRI results today. On levaquin. Recommended to d/c home. No indication for admission.  Add flagyl if pt was not on flagyl. F/u with GI this week.  [FR]      ED Course User Index  [FR] Vitor Chauhan, SHRAVAN        Labs Reviewed   COMPREHENSIVE METABOLIC PANEL - Abnormal; Notable for the following components:       Result Value    CO2 30.0 (*)     Total Protein 5.5 (*)     Albumin 3.10 (*)     Total Bilirubin <0.2 (*)     All other components within normal limits    Narrative:     GFR Normal >60  Chronic Kidney Disease <60  Kidney Failure <15   LIPASE - Abnormal; Notable for the following components:    Lipase 78 (*)     All other components within normal limits   CBC WITH AUTO DIFFERENTIAL - Abnormal; Notable for the following components:    WBC 24.77 (*)     Hemoglobin 11.1 (*)     MCV 78.2 (*)     MCH 25.5 (*)     RDW 16.1 (*)     Platelets 501 (*)     Immature Grans % 0.9 (*)     Neutrophils, Absolute 14.80 (*)     Lymphocytes, Absolute 7.67 (*)     Monocytes, Absolute 1.64 (*)     Immature Grans, Absolute 0.23 (*)     All other components within normal limits   URINALYSIS W/ CULTURE IF INDICATED - Normal    Narrative:     Urine microscopic not indicated.   HCG, SERUM, QUALITATIVE - Normal   LACTIC ACID, PLASMA - Normal   BRISA ALBICANS, GARDNERELLA VAGINALIS, TRICHOMONAS VAGINALIS,DNA   CLOSTRIDIUM DIFFICILE TOXIN    Narrative:     The following orders were created for panel order Clostridium Difficile Toxin - Stool, Per Rectum.  Procedure                               Abnormality         Status                     ---------                               -----------         ------                     Clostridium  Difficile To...[[id number]]                      In process                   Please view results for these tests on the individual orders.   CLOSTRIDIUM DIFFICILE TOXIN, PCR   BLOOD CULTURE   BLOOD CULTURE   CBC AND DIFFERENTIAL    Narrative:     The following orders were created for panel order CBC & Differential.  Procedure                               Abnormality         Status                     ---------                               -----------         ------                     CBC Auto Differential[[id number]]        Abnormal            Final result                 Please view results for these tests on the individual orders.   EXTRA TUBES    Narrative:     The following orders were created for panel order Extra Tubes.  Procedure                               Abnormality         Status                     ---------                               -----------         ------                     Light Blue Top[[id number]]                                   Final result                 Please view results for these tests on the individual orders.   LIGHT BLUE TOP       No orders to display               MDM  Number of Diagnoses or Management Options  Diarrhea, unspecified type: new and requires workup  Right lower quadrant abdominal pain: established and worsening  Diagnosis management comments: Extensive workup for GI. Afebrile. Normal lactate. Elevated WBC. Pt just completed the course of steroid. Pain improved in ER. Attempted to check for bacterial vaginosis, the swab was collected incorrectly, pt would be on flagyl, no indication to repeat to collect for bacterial vaginosis. No urgent transvaginal US indicated. Consulted GI and hospitalist, no indication for admission. F/u with GI as outpatient. F/u with PCP in 1 day to repeat CBC.       Amount and/or Complexity of Data Reviewed  Clinical lab tests: reviewed  Decide to obtain previous medical records or to obtain history from someone other than the patient:  yes  Discuss the patient with other providers: yes    Risk of Complications, Morbidity, and/or Mortality  Presenting problems: moderate  Diagnostic procedures: moderate  Management options: moderate    Patient Progress  Patient progress: improved      Final diagnoses:   Right lower quadrant abdominal pain   Diarrhea, unspecified type              Vitor Chauhan FNP  10/01/19 7964

## 2019-10-01 NOTE — TELEPHONE ENCOUNTER
"Patient called, had MRI this am. States she is in pain but not excruciating pain. No fever but she just doesn't feel \"right\" she feels that something is wrong. She was calling from work. It was explained to her that the MRI results were not back yet and if she felt like there was an issue she should go to the ER or urgent care. They have access to lab and testing that we don not have in office. Patient voiced understanding.  "

## 2019-10-01 NOTE — ED TRIAGE NOTES
Pt has had a GI infection that she is being seen for and has had upper and lower scopes. Pt had MRI with contrast today and has been having diarrhea and nausea.

## 2019-10-02 ENCOUNTER — OFFICE VISIT (OUTPATIENT)
Dept: FAMILY MEDICINE CLINIC | Facility: CLINIC | Age: 44
End: 2019-10-02

## 2019-10-02 VITALS
SYSTOLIC BLOOD PRESSURE: 110 MMHG | TEMPERATURE: 99.2 F | DIASTOLIC BLOOD PRESSURE: 70 MMHG | OXYGEN SATURATION: 97 % | WEIGHT: 219.4 LBS | BODY MASS INDEX: 37.46 KG/M2 | HEIGHT: 64 IN | HEART RATE: 85 BPM

## 2019-10-02 DIAGNOSIS — R10.31 RIGHT LOWER QUADRANT ABDOMINAL PAIN: Primary | ICD-10-CM

## 2019-10-02 DIAGNOSIS — K59.1 FUNCTIONAL DIARRHEA: ICD-10-CM

## 2019-10-02 DIAGNOSIS — R10.84 GENERALIZED ABDOMINAL PAIN: Primary | ICD-10-CM

## 2019-10-02 PROCEDURE — 99212 OFFICE O/P EST SF 10 MIN: CPT | Performed by: FAMILY MEDICINE

## 2019-10-02 NOTE — ED NOTES
Waiting for blood cultures to be drawn prior to starting antibiotic.     Catalina Minaya RN  10/01/19 6152

## 2019-10-02 NOTE — PROGRESS NOTES
" Subjective   Suzi Heck is a 44 y.o. female.     History of Present Illness     Er follow up.   Mri suggests has crohn's terminal ilium  neurontin helped pain at 1/2 tablet.   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  Given rx's dicyclomine, flagyl, zofran    Review of Systems   Constitutional: Negative for chills, fatigue and fever.   HENT: Negative for congestion, ear discharge, ear pain, facial swelling, hearing loss, postnasal drip, rhinorrhea, sinus pressure, sore throat, trouble swallowing and voice change.    Eyes: Negative for discharge, redness and visual disturbance.   Respiratory: Negative for cough, chest tightness, shortness of breath and wheezing.    Cardiovascular: Negative for chest pain and palpitations.   Gastrointestinal: Positive for abdominal pain. Negative for blood in stool, constipation, diarrhea, nausea and vomiting.   Endocrine: Negative for polydipsia and polyuria.   Genitourinary: Negative for dysuria, flank pain, hematuria and urgency.   Musculoskeletal: Negative for arthralgias, back pain, joint swelling and myalgias.   Skin: Negative for rash.   Neurological: Negative for dizziness, weakness, numbness and headaches.   Hematological: Negative for adenopathy.   Psychiatric/Behavioral: Negative for confusion and sleep disturbance. The patient is not nervous/anxious.            /70 (BP Location: Left arm, Patient Position: Sitting, Cuff Size: Adult)   Pulse 85   Temp 99.2 °F (37.3 °C) (Temporal)   Ht 162.6 cm (64.02\")   Wt 99.5 kg (219 lb 6.4 oz)   LMP 09/24/2019 (Within Days)   SpO2 97%   BMI 37.64 kg/m²       Objective     Physical Exam   Constitutional: She is oriented to person, place, and time. She appears well-developed and well-nourished.   HENT:   Head: Normocephalic and atraumatic.   Right Ear: External ear normal.   Left Ear: External ear normal.   Nose: Nose normal.   Eyes: Conjunctivae and EOM are normal. Pupils are equal, round, and reactive to " light.   Neck: Normal range of motion.   Pulmonary/Chest: Effort normal.   Musculoskeletal: Normal range of motion.   Neurological: She is alert and oriented to person, place, and time.   Psychiatric: She has a normal mood and affect. Her behavior is normal. Judgment and thought content normal.   Nursing note and vitals reviewed.          PAST MEDICAL HISTORY     Past Medical History:   Diagnosis Date   • Abdominal pain     right lower quadrant      • Allergy     seasonal   • Conjunctivitis    • Encounter for routine gynecological examination    • Gastroesophageal reflux disease    • Irritable bowel syndrome with diarrhea    • Pigmented skin lesion    • PONV (postoperative nausea and vomiting)    • Screening examination for venereal disease    • Viral gastroenteritis       PAST SURGICAL HISTORY     Past Surgical History:   Procedure Laterality Date   •  SECTION  2007    (1) - Primary low transverse C section   • CHOLECYSTECTOMY     • COLONOSCOPY N/A 2019    Procedure: COLONOSCOPY;  Surgeon: Myles Ferraro MD;  Location: [facility] ENDOSCOPY;  Service: Gastroenterology   • ENDOSCOPY N/A 2019    Procedure: ESOPHAGOGASTRODUODENOSCOPY;  Surgeon: Myles Ferraro MD;  Location: [facility] ENDOSCOPY;  Service: Gastroenterology   • INJECTION OF MEDICATION  2015    Zofran (1) - TRINA Whittington   • OTHER SURGICAL HISTORY       Foot/toes surgery procedure (1)  -  x 2   • OTHER SURGICAL HISTORY  2007    EXAM OF CERVIX W/SCOPE 78911 (1) - mild dysplasia(JOANIE I) Squamoglandular mucosa. Chronic cervicitis. Curettings, Endocervix: unremarkable columnar cell epithelium   • OTHER SURGICAL HISTORY  2016    Biopsy, Each Additional Lesion 53002 (1) - LONNIE Maldonado   • OTHER SURGICAL HISTORY  2010    Remove Impacted Cerumen 21705 (1) - DIONISIO Eaton   • PAP SMEAR  10/15/2007    (1) - Epithelial cell abnormality: squamous cells, Atypical squamouscells of undetermined significance. cannot exclude HSIL/(ASC-H)     • SKIN BIOPSY  06/16/2016    Biopsy of Skin 64256 (1) - MARYLOUAlonzo Wesjulia   • THUMB CARPOMETACARPAL JOINT ARTHROPLASTY FLEXOR CARPI RADIALIS TENDON Right 4/13/2017    Procedure: THUMB CARPOMETACARPAL JOINT ARTHROPLASTY FLEXOR CARPI RADIALIS TENDON RELEASE  EXCISION OF GANGLLION AND RELEASE OF A1 PULLEY WRIST EXTENSOR DECREASED VEINS DISEASE;  Surgeon: Kolton Emery MD;  Location: [facility];  Service:       SOCIAL HISTORY     Social History     Socioeconomic History   • Marital status:      Spouse name: Not on file   • Number of children: Not on file   • Years of education: Not on file   • Highest education level: Not on file   Tobacco Use   • Smoking status: Current Every Day Smoker     Packs/day: 0.75     Years: 25.00     Pack years: 18.75     Types: Cigarettes     Start date: 1/17/1990   • Smokeless tobacco: Never Used   • Tobacco comment: Current Smoker -    Substance and Sexual Activity   • Alcohol use: No   • Drug use: No   • Sexual activity: Yes     Partners: Male     Birth control/protection: Surgical     Comment: Vasectomy      ALLERGIES   Adhesive tape; Codeine; Demerol [meperidine]; and Vicodin [hydrocodone-acetaminophen]   MEDICATIONS     Current Outpatient Medications   Medication Sig Dispense Refill   • gabapentin (NEURONTIN) 600 MG tablet Take 0.5-1 tablets by mouth 3 (Three) Times a Day. 90 tablet 0   • levoFLOXacin (LEVAQUIN) 500 MG tablet Take 1 tablet by mouth Daily. 7 tablet 0   • mesalamine (APRISO) 0.375 g 24 hr capsule Take 4 capsules by mouth Daily. 120 capsule 1   • omeprazole (priLOSEC) 40 MG capsule Take 1 capsule by mouth Daily. 90 capsule 3   • dicyclomine (BENTYL) 10 MG capsule Take 2 capsules by mouth 4 (Four) Times a Day As Needed (abdominal pain). 28 capsule 0   • metroNIDAZOLE (FLAGYL) 500 MG tablet Take 1 tablet by mouth 4 (Four) Times a Day for 7 days. 28 tablet 0   • nicotine (NICODERM CQ) 21 MG/24HR patch Place 1 patch on the skin as directed by provider Daily. 28 each 1   •  ondansetron ODT (ZOFRAN-ODT) 4 MG disintegrating tablet Place 1 tablet on the tongue Every 6 (Six) Hours As Needed for Nausea. 15 tablet 0     No current facility-administered medications for this visit.         The following portions of the patient's history were reviewed and updated as appropriate: allergies, current medications, past family history, past medical history, past social history, past surgical history and problem list.        Assessment/Plan   Suzi was seen today for follow-up.    Diagnoses and all orders for this visit:    Right lower quadrant abdominal pain    follow up [facility] tomorrow.   Encouraged now have diagnosis  Pain is probably referred pain form abnormal finding on mri.                   No Follow-up on file.                  This document has been electronically signed by Nicholas Maldonado MD on October 2, 2019 2:24 PM

## 2019-10-02 NOTE — ED NOTES
ED tech attempted blood cultures - not successful. Cannot start Zosyn yet.     Catalina Minaya RN  10/01/19 [id number]

## 2019-10-06 LAB
BACTERIA SPEC AEROBE CULT: NORMAL
BACTERIA SPEC AEROBE CULT: NORMAL

## 2019-10-07 ENCOUNTER — HOSPITAL ENCOUNTER (OUTPATIENT)
Dept: GASTROENTEROLOGY | Facility: HOSPITAL | Age: 44
Discharge: HOME | End: 2019-10-07
Admitting: NURSE PRACTITIONER

## 2019-10-07 DIAGNOSIS — R10.84 GENERALIZED ABDOMINAL PAIN: ICD-10-CM

## 2019-10-07 DIAGNOSIS — K59.1 FUNCTIONAL DIARRHEA: ICD-10-CM

## 2019-10-07 PROCEDURE — 91110 GI TRC IMG INTRAL ESOPH-ILE: CPT

## 2019-10-07 PROCEDURE — C1889 IMPLANT/INSERT DEVICE, NOC: HCPCS

## 2019-10-07 PROCEDURE — 0

## 2019-10-07 NOTE — NURSING NOTE
Room arrival time_____0740__________________________    Does patient have a pacemaker or implantable device (if yes, this is contraindicated) :  no     B/P:134/93    Heart Rate:93    O2 Sat %:98    Temp:98.3      Pain:0          If yes, location and VAS _____________________________    Allergies:adhesive tape,codeine,demoral,vicodan  NPO status:10/06/2019 2200  Pillcam Lot# :[id number]  Pillcam expiration date:11/22/2020    Patient swallowed capsule @ __0750___________      Patient arrived to Endoscopy department ambulatory, alert and oriented.  Procedure explained to patient, patient verbalized understanding.  Consent obtained.  Patient swallowed capsule without difficulty.  Dietary instructions given and patient instructed on time to return to the Endoscopy department.    Discharged from endo time_________________________

## 2019-10-22 ENCOUNTER — APPOINTMENT (OUTPATIENT)
Dept: LAB | Facility: HOSPITAL | Age: 44
End: 2019-10-22

## 2019-10-22 ENCOUNTER — OFFICE VISIT (OUTPATIENT)
Dept: GASTROENTEROLOGY | Facility: CLINIC | Age: 44
End: 2019-10-22

## 2019-10-22 VITALS
BODY MASS INDEX: 36.37 KG/M2 | HEIGHT: 64 IN | OXYGEN SATURATION: 99 % | SYSTOLIC BLOOD PRESSURE: 118 MMHG | DIASTOLIC BLOOD PRESSURE: 80 MMHG | HEART RATE: 85 BPM | WEIGHT: 213 LBS

## 2019-10-22 DIAGNOSIS — K50.018 CROHN'S DISEASE OF SMALL INTESTINE WITH OTHER COMPLICATION (HCC): ICD-10-CM

## 2019-10-22 DIAGNOSIS — R10.84 GENERALIZED ABDOMINAL PAIN: ICD-10-CM

## 2019-10-22 DIAGNOSIS — K50.018: Primary | ICD-10-CM

## 2019-10-22 LAB
ALBUMIN SERPL-MCNC: 4 G/DL (ref 3.5–5.2)
ALBUMIN/GLOB SERPL: 1.3 G/DL
ALP SERPL-CCNC: 60 U/L (ref 39–117)
ALT SERPL W P-5'-P-CCNC: 11 U/L (ref 1–33)
ANION GAP SERPL CALCULATED.3IONS-SCNC: 10.2 MMOL/L (ref 5–15)
AST SERPL-CCNC: 9 U/L (ref 1–32)
BASOPHILS # BLD AUTO: 0.08 10*3/MM3 (ref 0–0.2)
BASOPHILS NFR BLD AUTO: 0.6 % (ref 0–1.5)
BILIRUB SERPL-MCNC: <0.2 MG/DL (ref 0.2–1.2)
BUN BLD-MCNC: 9 MG/DL (ref 6–20)
BUN/CREAT SERPL: 12.7 (ref 7–25)
CALCIUM SPEC-SCNC: 9 MG/DL (ref 8.6–10.5)
CHLORIDE SERPL-SCNC: 108 MMOL/L (ref 98–107)
CO2 SERPL-SCNC: 23.8 MMOL/L (ref 22–29)
CREAT BLD-MCNC: 0.71 MG/DL (ref 0.57–1)
DEPRECATED RDW RBC AUTO: 43.9 FL (ref 37–54)
EOSINOPHIL # BLD AUTO: 0.23 10*3/MM3 (ref 0–0.4)
EOSINOPHIL NFR BLD AUTO: 1.6 % (ref 0.3–6.2)
ERYTHROCYTE [DISTWIDTH] IN BLOOD BY AUTOMATED COUNT: 15.2 % (ref 12.3–15.4)
GFR SERPL CREATININE-BSD FRML MDRD: 90 ML/MIN/1.73
GLOBULIN UR ELPH-MCNC: 3.2 GM/DL
GLUCOSE BLD-MCNC: 80 MG/DL (ref 65–99)
HCT VFR BLD AUTO: 38.6 % (ref 34–46.6)
HGB BLD-MCNC: 12.3 G/DL (ref 12–15.9)
IMM GRANULOCYTES # BLD AUTO: 0.08 10*3/MM3 (ref 0–0.05)
IMM GRANULOCYTES NFR BLD AUTO: 0.6 % (ref 0–0.5)
LYMPHOCYTES # BLD AUTO: 3.72 10*3/MM3 (ref 0.7–3.1)
LYMPHOCYTES NFR BLD AUTO: 26 % (ref 19.6–45.3)
MCH RBC QN AUTO: 25.7 PG (ref 26.6–33)
MCHC RBC AUTO-ENTMCNC: 31.9 G/DL (ref 31.5–35.7)
MCV RBC AUTO: 80.6 FL (ref 79–97)
MONOCYTES # BLD AUTO: 0.84 10*3/MM3 (ref 0.1–0.9)
MONOCYTES NFR BLD AUTO: 5.9 % (ref 5–12)
NEUTROPHILS # BLD AUTO: 9.34 10*3/MM3 (ref 1.7–7)
NEUTROPHILS NFR BLD AUTO: 65.3 % (ref 42.7–76)
NRBC BLD AUTO-RTO: 0 /100 WBC (ref 0–0.2)
PLATELET # BLD AUTO: 469 10*3/MM3 (ref 140–450)
PMV BLD AUTO: 10.4 FL (ref 6–12)
POTASSIUM BLD-SCNC: 4.8 MMOL/L (ref 3.5–5.2)
PROT SERPL-MCNC: 7.2 G/DL (ref 6–8.5)
RBC # BLD AUTO: 4.79 10*6/MM3 (ref 3.77–5.28)
SODIUM BLD-SCNC: 142 MMOL/L (ref 136–145)
WBC NRBC COR # BLD: 14.29 10*3/MM3 (ref 3.4–10.8)

## 2019-10-22 PROCEDURE — 99213 OFFICE O/P EST LOW 20 MIN: CPT | Performed by: NURSE PRACTITIONER

## 2019-10-22 PROCEDURE — 80053 COMPREHEN METABOLIC PANEL: CPT | Performed by: NURSE PRACTITIONER

## 2019-10-22 PROCEDURE — 85025 COMPLETE CBC W/AUTO DIFF WBC: CPT | Performed by: NURSE PRACTITIONER

## 2019-10-22 PROCEDURE — 36415 COLL VENOUS BLD VENIPUNCTURE: CPT | Performed by: NURSE PRACTITIONER

## 2019-10-22 RX ORDER — MESALAMINE 0.38 G/1
1500 CAPSULE, EXTENDED RELEASE ORAL DAILY
Qty: 120 CAPSULE | Refills: 5 | Status: SHIPPED | OUTPATIENT
Start: 2019-10-22 | End: 2021-01-19

## 2019-10-22 RX ORDER — BUDESONIDE 3 MG/1
CAPSULE, COATED PELLETS ORAL
Qty: 154 CAPSULE | Refills: 0 | Status: SHIPPED | OUTPATIENT
Start: 2019-10-22 | End: 2021-01-19

## 2019-10-22 NOTE — PROGRESS NOTES
Chief Complaint   Patient presents with   • Generalized abdominal pain +5 more       Subjective    Suzi Heck is a 44 y.o. female. she is here today for follow-up.    History of Present Illness  44-year-old female presents to discuss more enterography and capsule endoscopy results.  Bowel habits have decreased to 1 to 2/day but still has some burning right lower quadrant abdominal pain.  IBD panel was not suggestive of inflammatory bowel disease.  At onset she was having bowel movements about 10-20 times a day and her CT noted dilated loops of small bowel with enlarged mesenteric lymph nodes.  She underwent EGD which noted mildly severe esophagitis, gastritis normal duodenum.  Biopsy only noted reactive change of squamous mucosa and reactive gastropathy and antrum.  Colonoscopy noted multiple ulcerations in the terminal ileum, external and internal hemorrhoids but biopsy of terminal ileum and random colonic biopsy no no significant histologic abnormality.  She was placed on antibiotic and steroid taper and symptoms only mildly improved.  We started patient on a pre-so and obtain MR enterography MR noted findings consistent with Crohn's disease.  She underwent capsule endoscopy results have not been released but did discuss with Dr. Tom who felt these were most consistent with Crohn's disease based on clinical presentation and other work-up.       The following portions of the patient's history were reviewed and updated as appropriate:   Past Medical History:   Diagnosis Date   • Abdominal pain     right lower quadrant      • Allergy     seasonal   • Conjunctivitis    • Encounter for routine gynecological examination    • Gastroesophageal reflux disease    • Irritable bowel syndrome with diarrhea    • Pigmented skin lesion    • PONV (postoperative nausea and vomiting)    • Screening examination for venereal disease    • Viral gastroenteritis      Past Surgical History:   Procedure Laterality Date   •   SECTION  2007    (1) - Primary low transverse C section   • CHOLECYSTECTOMY  2005   • COLONOSCOPY N/A 2019    Procedure: COLONOSCOPY;  Surgeon: Myles Ferraro MD;  Location: [facility] ENDOSCOPY;  Service: Gastroenterology   • ENDOSCOPY N/A 2019    Procedure: ESOPHAGOGASTRODUODENOSCOPY;  Surgeon: Myles Ferraro MD;  Location: [facility] ENDOSCOPY;  Service: Gastroenterology   • INJECTION OF MEDICATION  2015    Zofran (1) - TRINA Whittington   • OTHER SURGICAL HISTORY       Foot/toes surgery procedure (1)  -  x 2   • OTHER SURGICAL HISTORY  2007    EXAM OF CERVIX W/SCOPE 21335 (1) - mild dysplasia(JOANIE I) Squamoglandular mucosa. Chronic cervicitis. Curettings, Endocervix: unremarkable columnar cell epithelium   • OTHER SURGICAL HISTORY  2016    Biopsy, Each Additional Lesion 16117 (1) - LONNIE Maldonado   • OTHER SURGICAL HISTORY  2010    Remove Impacted Cerumen 89008 (1) - DIONISIO Eaton   • PAP SMEAR  10/15/2007    (1) - Epithelial cell abnormality: squamous cells, Atypical squamouscells of undetermined significance. cannot exclude HSIL/(ASC-H)    • SKIN BIOPSY  2016    Biopsy of Skin 44043 (1) - LONNIE Maldonado   • THUMB CARPOMETACARPAL JOINT ARTHROPLASTY FLEXOR CARPI RADIALIS TENDON Right 2017    Procedure: THUMB CARPOMETACARPAL JOINT ARTHROPLASTY FLEXOR CARPI RADIALIS TENDON RELEASE  EXCISION OF GANGLLION AND RELEASE OF A1 PULLEY WRIST EXTENSOR DECREASED VEINS DISEASE;  Surgeon: Kolton Emery MD;  Location: [facility] OR;  Service:      Family History   Problem Relation Age of Onset   • Diabetes Father    • Prostate cancer Father    • Pancreatic cancer Father      OB History      Para Term  AB Living    1 1 1     1    SAB TAB Ectopic Molar Multiple Live Births              1        Prior to Admission medications    Medication Sig Start Date End Date Taking? Authorizing Provider   mesalamine (APRISO) 0.375 g 24 hr capsule Take 4 capsules by mouth Daily. 10/22/19  Yes Maria Elena De La Cruz  "PEDRO BURCH   omeprazole (priLOSEC) 40 MG capsule Take 1 capsule by mouth Daily. 9/5/19  Yes Nicholas Maldonado MD   mesalamine (APRISO) 0.375 g 24 hr capsule Take 4 capsules by mouth Daily. 9/26/19 10/22/19 Yes Maria Elena De La Cruz APRN   Budesonide (ENTOCORT EC) 3 MG 24 hr capsule 3 tablets by mouth daily for 6 weeks, then 2 tablets by mouth daily for 2 weeks 10/22/19   Maria Elena De La Cruz APRN   dicyclomine (BENTYL) 10 MG capsule Take 2 capsules by mouth 4 (Four) Times a Day As Needed (abdominal pain). 10/1/19 10/22/19  Vitor Chauhan FNP   gabapentin (NEURONTIN) 600 MG tablet Take 0.5-1 tablets by mouth 3 (Three) Times a Day. 9/26/19 10/22/19  Nicholas Maldonado MD   levoFLOXacin (LEVAQUIN) 500 MG tablet Take 1 tablet by mouth Daily. 9/26/19 10/22/19  Maria Elena De La Cruz APRN   nicotine (NICODERM CQ) 21 MG/24HR patch Place 1 patch on the skin as directed by provider Daily. 9/20/19 10/22/19  Joey Rojas III, MD   ondansetron ODT (ZOFRAN-ODT) 4 MG disintegrating tablet Place 1 tablet on the tongue Every 6 (Six) Hours As Needed for Nausea. 10/1/19 10/22/19  Vitor Chauhan FNP     Allergies   Allergen Reactions   • Adhesive Tape Other (See Comments)     blisters   • Codeine      UNKNOWN REACTION   • Demerol [Meperidine] Mental Status Change   • Vicodin [Hydrocodone-Acetaminophen] Other (See Comments)     \"non functional\"     Social History     Socioeconomic History   • Marital status:      Spouse name: Not on file   • Number of children: Not on file   • Years of education: Not on file   • Highest education level: Not on file   Tobacco Use   • Smoking status: Current Every Day Smoker     Packs/day: 0.75     Years: 25.00     Pack years: 18.75     Types: Cigarettes     Start date: 1/17/1990   • Smokeless tobacco: Never Used   • Tobacco comment: Current Smoker -    Substance and Sexual Activity   • Alcohol use: No   • Drug use: No   • Sexual activity: Yes     Partners: Male     Birth control/protection: Surgical     Comment: Vasectomy " "      Review of Systems  Review of Systems   Constitutional: Positive for fatigue. Negative for activity change, appetite change, chills, diaphoresis, fever and unexpected weight change.   HENT: Negative for sore throat and trouble swallowing.    Respiratory: Negative for shortness of breath.    Gastrointestinal: Positive for abdominal pain and nausea. Negative for abdominal distention, anal bleeding, blood in stool, constipation, diarrhea, rectal pain and vomiting.   Musculoskeletal: Negative for arthralgias.   Skin: Negative for pallor.   Neurological: Negative for light-headedness.        /80   Pulse 85   Ht 162.6 cm (64\")   Wt 96.6 kg (213 lb)   LMP 09/24/2019 (Within Days)   SpO2 99%   BMI 36.56 kg/m²     Objective    Physical Exam   Constitutional: She is oriented to person, place, and time. She appears well-developed and well-nourished. She is cooperative. No distress.   HENT:   Head: Normocephalic and atraumatic.   Neck: Normal range of motion. Neck supple. No thyromegaly present.   Cardiovascular: Normal rate, regular rhythm and normal heart sounds.   Pulmonary/Chest: Effort normal and breath sounds normal. She has no wheezes. She has no rhonchi. She has no rales.   Abdominal: Soft. Normal appearance and bowel sounds are normal. She exhibits no distension. There is no hepatosplenomegaly. There is generalized tenderness and tenderness in the right lower quadrant. There is no rigidity and no guarding. No hernia.   Lymphadenopathy:     She has no cervical adenopathy.   Neurological: She is alert and oriented to person, place, and time.   Skin: Skin is warm, dry and intact. No rash noted. No pallor.   Psychiatric: She has a normal mood and affect. Her speech is normal.     Admission on 10/01/2019, Discharged on 10/01/2019   Component Date Value Ref Range Status   • Glucose 10/01/2019 95  65 - 99 mg/dL Final   • BUN 10/01/2019 13  6 - 20 mg/dL Final   • Creatinine 10/01/2019 0.75  0.57 - 1.00 mg/dL " Final   • Sodium 10/01/2019 141  136 - 145 mmol/L Final   • Potassium 10/01/2019 3.5  3.5 - 5.2 mmol/L Final   • Chloride 10/01/2019 103  98 - 107 mmol/L Final   • CO2 10/01/2019 30.0* 22.0 - 29.0 mmol/L Final   • Calcium 10/01/2019 8.6  8.6 - 10.5 mg/dL Final   • Total Protein 10/01/2019 5.5* 6.0 - 8.5 g/dL Final   • Albumin 10/01/2019 3.10* 3.50 - 5.20 g/dL Final   • ALT (SGPT) 10/01/2019 15  1 - 33 U/L Final   • AST (SGOT) 10/01/2019 10  1 - 32 U/L Final   • Alkaline Phosphatase 10/01/2019 45  39 - 117 U/L Final   • Total Bilirubin 10/01/2019 <0.2* 0.2 - 1.2 mg/dL Final   • eGFR Non African Amer 10/01/2019 84  >60 mL/min/1.73 Final   • Globulin 10/01/2019 2.4  gm/dL Final   • A/G Ratio 10/01/2019 1.3  g/dL Final   • BUN/Creatinine Ratio 10/01/2019 17.3  7.0 - 25.0 Final   • Anion Gap 10/01/2019 8.0  5.0 - 15.0 mmol/L Final   • Lipase 10/01/2019 78* 13 - 60 U/L Final   • Color, UA 10/01/2019 Yellow  Yellow, Straw, Dark Yellow, Luh Final   • Appearance, UA 10/01/2019 Clear  Clear Final   • pH, UA 10/01/2019 6.0  5.0 - 9.0 Final   • Specific Gravity, UA 10/01/2019 1.024  1.003 - 1.030 Final   • Glucose, UA 10/01/2019 Negative  Negative Final   • Ketones, UA 10/01/2019 Negative  Negative Final   • Bilirubin, UA 10/01/2019 Negative  Negative Final   • Blood, UA 10/01/2019 Negative  Negative Final   • Protein, UA 10/01/2019 Negative  Negative Final   • Leuk Esterase, UA 10/01/2019 Negative  Negative Final   • Nitrite, UA 10/01/2019 Negative  Negative Final   • Urobilinogen, UA 10/01/2019 0.2 E.U./dL  0.2 - 1.0 E.U./dL Final   • WBC 10/01/2019 24.77* 3.40 - 10.80 10*3/mm3 Final   • RBC 10/01/2019 4.36  3.77 - 5.28 10*6/mm3 Final   • Hemoglobin 10/01/2019 11.1* 12.0 - 15.9 g/dL Final   • Hematocrit 10/01/2019 34.1  34.0 - 46.6 % Final   • MCV 10/01/2019 78.2* 79.0 - 97.0 fL Final   • MCH 10/01/2019 25.5* 26.6 - 33.0 pg Final   • MCHC 10/01/2019 32.6  31.5 - 35.7 g/dL Final   • RDW 10/01/2019 16.1* 12.3 - 15.4 % Final    • RDW-SD 10/01/2019 46.0  37.0 - 54.0 fl Final   • MPV 10/01/2019 9.4  6.0 - 12.0 fL Final   • Platelets 10/01/2019 501* 140 - 450 10*3/mm3 Final   • Neutrophil % 10/01/2019 59.8  42.7 - 76.0 % Final   • Lymphocyte % 10/01/2019 31.0  19.6 - 45.3 % Final   • Monocyte % 10/01/2019 6.6  5.0 - 12.0 % Final   • Eosinophil % 10/01/2019 1.3  0.3 - 6.2 % Final   • Basophil % 10/01/2019 0.4  0.0 - 1.5 % Final   • Immature Grans % 10/01/2019 0.9* 0.0 - 0.5 % Final   • Neutrophils, Absolute 10/01/2019 14.80* 1.70 - 7.00 10*3/mm3 Final   • Lymphocytes, Absolute 10/01/2019 7.67* 0.70 - 3.10 10*3/mm3 Final   • Monocytes, Absolute 10/01/2019 1.64* 0.10 - 0.90 10*3/mm3 Final   • Eosinophils, Absolute 10/01/2019 0.32  0.00 - 0.40 10*3/mm3 Final   • Basophils, Absolute 10/01/2019 0.11  0.00 - 0.20 10*3/mm3 Final   • Immature Grans, Absolute 10/01/2019 0.23* 0.00 - 0.05 10*3/mm3 Final   • nRBC 10/01/2019 0.0  0.0 - 0.2 /100 WBC Final   • HCG Qualitative 10/01/2019 Negative  Negative Final   • C. Difficile Toxins by PCR 10/01/2019 Negative  Negative Final   • Lactate 10/01/2019 1.0  0.5 - 2.0 mmol/L Final   • Extra Tube 10/01/2019 hold for add-on   Final    Auto resulted   • Blood Culture 10/01/2019 No growth at 5 days   Final   • Blood Culture 10/01/2019 No growth at 5 days   Final     Assessment/Plan      1. Terminal ileitis of small intestine, other complication (CMS/HCC)    2. Generalized abdominal pain    3. Crohn's disease of small intestine with other complication (CMS/HCC)    .   Will again reviewed case once capsule endoscopy is fully reviewed we will recheck CBC, CMP and start patient on Entocort taper due to continual inflammation of terminal ileum and ulcer seen on capsule study.  Discussed adding immunomodulators if no improvement in symptoms and information given about biologic therapy.  Discussed cornerstones of IBD and portance of health screenings.    Orders placed during this encounter include:  Orders Placed This  Encounter   Procedures   • Comprehensive Metabolic Panel   • CBC Auto Differential   • CBC & Differential     Order Specific Question:   Manual Differential     Answer:   No       * Surgery not found *    Review and/or summary of lab tests, radiology, procedures, medications. Review and summary of old records and obtaining of history. The risks and benefits of my recommendations, as well as other treatment options were discussed with the patient today. Questions were answered.    New Medications Ordered This Visit   Medications   • mesalamine (APRISO) 0.375 g 24 hr capsule     Sig: Take 4 capsules by mouth Daily.     Dispense:  120 capsule     Refill:  5   • Budesonide (ENTOCORT EC) 3 MG 24 hr capsule     Sig: 3 tablets by mouth daily for 6 weeks, then 2 tablets by mouth daily for 2 weeks     Dispense:  154 capsule     Refill:  0       Follow-up: Return in about 8 weeks (around 12/17/2019).          This document has been electronically signed by PEDRO Smith on October 24, 2019 9:13 AM             Results for orders placed or performed in visit on 10/22/19   CBC Auto Differential   Result Value Ref Range    WBC 14.29 (H) 3.40 - 10.80 10*3/mm3    RBC 4.79 3.77 - 5.28 10*6/mm3    Hemoglobin 12.3 12.0 - 15.9 g/dL    Hematocrit 38.6 34.0 - 46.6 %    MCV 80.6 79.0 - 97.0 fL    MCH 25.7 (L) 26.6 - 33.0 pg    MCHC 31.9 31.5 - 35.7 g/dL    RDW 15.2 12.3 - 15.4 %    RDW-SD 43.9 37.0 - 54.0 fl    MPV 10.4 6.0 - 12.0 fL    Platelets 469 (H) 140 - 450 10*3/mm3    Neutrophil % 65.3 42.7 - 76.0 %    Lymphocyte % 26.0 19.6 - 45.3 %    Monocyte % 5.9 5.0 - 12.0 %    Eosinophil % 1.6 0.3 - 6.2 %    Basophil % 0.6 0.0 - 1.5 %    Immature Grans % 0.6 (H) 0.0 - 0.5 %    Neutrophils, Absolute 9.34 (H) 1.70 - 7.00 10*3/mm3    Lymphocytes, Absolute 3.72 (H) 0.70 - 3.10 10*3/mm3    Monocytes, Absolute 0.84 0.10 - 0.90 10*3/mm3    Eosinophils, Absolute 0.23 0.00 - 0.40 10*3/mm3    Basophils, Absolute 0.08 0.00 - 0.20 10*3/mm3     Immature Grans, Absolute 0.08 (H) 0.00 - 0.05 10*3/mm3    nRBC 0.0 0.0 - 0.2 /100 WBC   Comprehensive Metabolic Panel   Result Value Ref Range    Glucose 80 65 - 99 mg/dL    BUN 9 6 - 20 mg/dL    Creatinine 0.71 0.57 - 1.00 mg/dL    Sodium 142 136 - 145 mmol/L    Potassium 4.8 3.5 - 5.2 mmol/L    Chloride 108 (H) 98 - 107 mmol/L    CO2 23.8 22.0 - 29.0 mmol/L    Calcium 9.0 8.6 - 10.5 mg/dL    Total Protein 7.2 6.0 - 8.5 g/dL    Albumin 4.00 3.50 - 5.20 g/dL    ALT (SGPT) 11 1 - 33 U/L    AST (SGOT) 9 1 - 32 U/L    Alkaline Phosphatase 60 39 - 117 U/L    Total Bilirubin <0.2 (L) 0.2 - 1.2 mg/dL    eGFR Non African Amer 90 >60 mL/min/1.73    Globulin 3.2 gm/dL    A/G Ratio 1.3 g/dL    BUN/Creatinine Ratio 12.7 7.0 - 25.0    Anion Gap 10.2 5.0 - 15.0 mmol/L   Results for orders placed or performed during the hospital encounter of 10/01/19   Urinalysis With Culture If Indicated - Urine, Clean Catch   Result Value Ref Range    Color, UA Yellow Yellow, Straw, Dark Yellow, Luh    Appearance, UA Clear Clear    pH, UA 6.0 5.0 - 9.0    Specific Gravity, UA 1.024 1.003 - 1.030    Glucose, UA Negative Negative    Ketones, UA Negative Negative    Bilirubin, UA Negative Negative    Blood, UA Negative Negative    Protein, UA Negative Negative    Leuk Esterase, UA Negative Negative    Nitrite, UA Negative Negative    Urobilinogen, UA 0.2 E.U./dL 0.2 - 1.0 E.U./dL   CBC Auto Differential   Result Value Ref Range    WBC 24.77 (H) 3.40 - 10.80 10*3/mm3    RBC 4.36 3.77 - 5.28 10*6/mm3    Hemoglobin 11.1 (L) 12.0 - 15.9 g/dL    Hematocrit 34.1 34.0 - 46.6 %    MCV 78.2 (L) 79.0 - 97.0 fL    MCH 25.5 (L) 26.6 - 33.0 pg    MCHC 32.6 31.5 - 35.7 g/dL    RDW 16.1 (H) 12.3 - 15.4 %    RDW-SD 46.0 37.0 - 54.0 fl    MPV 9.4 6.0 - 12.0 fL    Platelets 501 (H) 140 - 450 10*3/mm3    Neutrophil % 59.8 42.7 - 76.0 %    Lymphocyte % 31.0 19.6 - 45.3 %    Monocyte % 6.6 5.0 - 12.0 %    Eosinophil % 1.3 0.3 - 6.2 %    Basophil % 0.4 0.0 - 1.5 %     Immature Grans % 0.9 (H) 0.0 - 0.5 %    Neutrophils, Absolute 14.80 (H) 1.70 - 7.00 10*3/mm3    Lymphocytes, Absolute 7.67 (H) 0.70 - 3.10 10*3/mm3    Monocytes, Absolute 1.64 (H) 0.10 - 0.90 10*3/mm3    Eosinophils, Absolute 0.32 0.00 - 0.40 10*3/mm3    Basophils, Absolute 0.11 0.00 - 0.20 10*3/mm3    Immature Grans, Absolute 0.23 (H) 0.00 - 0.05 10*3/mm3    nRBC 0.0 0.0 - 0.2 /100 WBC   Light Blue Top   Result Value Ref Range    Extra Tube hold for add-on    Blood Culture - Blood, Arm, Left   Result Value Ref Range    Blood Culture No growth at 5 days    Blood Culture - Blood, Arm, Left   Result Value Ref Range    Blood Culture No growth at 5 days    Clostridium Difficile Toxin, PCR - Stool, Per Rectum   Result Value Ref Range    C. Difficile Toxins by PCR Negative Negative   hCG, Serum, Qualitative   Result Value Ref Range    HCG Qualitative Negative Negative   Lipase   Result Value Ref Range    Lipase 78 (H) 13 - 60 U/L   Lactic Acid, Plasma   Result Value Ref Range    Lactate 1.0 0.5 - 2.0 mmol/L   Comprehensive Metabolic Panel   Result Value Ref Range    Glucose 95 65 - 99 mg/dL    BUN 13 6 - 20 mg/dL    Creatinine 0.75 0.57 - 1.00 mg/dL    Sodium 141 136 - 145 mmol/L    Potassium 3.5 3.5 - 5.2 mmol/L    Chloride 103 98 - 107 mmol/L    CO2 30.0 (H) 22.0 - 29.0 mmol/L    Calcium 8.6 8.6 - 10.5 mg/dL    Total Protein 5.5 (L) 6.0 - 8.5 g/dL    Albumin 3.10 (L) 3.50 - 5.20 g/dL    ALT (SGPT) 15 1 - 33 U/L    AST (SGOT) 10 1 - 32 U/L    Alkaline Phosphatase 45 39 - 117 U/L    Total Bilirubin <0.2 (L) 0.2 - 1.2 mg/dL    eGFR Non African Amer 84 >60 mL/min/1.73    Globulin 2.4 gm/dL    A/G Ratio 1.3 g/dL    BUN/Creatinine Ratio 17.3 7.0 - 25.0    Anion Gap 8.0 5.0 - 15.0 mmol/L     *Note: Due to a large number of results and/or encounters for the requested time period, some results have not been displayed. A complete set of results can be found in Results Review.

## 2019-12-17 ENCOUNTER — APPOINTMENT (OUTPATIENT)
Dept: LAB | Facility: HOSPITAL | Age: 44
End: 2019-12-17

## 2019-12-17 ENCOUNTER — OFFICE VISIT (OUTPATIENT)
Dept: GASTROENTEROLOGY | Facility: CLINIC | Age: 44
End: 2019-12-17

## 2019-12-17 VITALS
DIASTOLIC BLOOD PRESSURE: 68 MMHG | BODY MASS INDEX: 38.28 KG/M2 | HEIGHT: 64 IN | WEIGHT: 224.2 LBS | HEART RATE: 84 BPM | SYSTOLIC BLOOD PRESSURE: 118 MMHG

## 2019-12-17 DIAGNOSIS — K50.018 CROHN'S DISEASE OF SMALL INTESTINE WITH OTHER COMPLICATION (HCC): Primary | ICD-10-CM

## 2019-12-17 LAB
ALBUMIN SERPL-MCNC: 3.7 G/DL (ref 3.5–5.2)
ALBUMIN/GLOB SERPL: 1.2 G/DL
ALP SERPL-CCNC: 67 U/L (ref 39–117)
ALT SERPL W P-5'-P-CCNC: 14 U/L (ref 1–33)
ANION GAP SERPL CALCULATED.3IONS-SCNC: 13.8 MMOL/L (ref 5–15)
AST SERPL-CCNC: 12 U/L (ref 1–32)
BASOPHILS # BLD AUTO: 0.08 10*3/MM3 (ref 0–0.2)
BASOPHILS NFR BLD AUTO: 0.5 % (ref 0–1.5)
BILIRUB SERPL-MCNC: 0.2 MG/DL (ref 0.2–1.2)
BUN BLD-MCNC: 11 MG/DL (ref 6–20)
BUN/CREAT SERPL: 13.9 (ref 7–25)
CALCIUM SPEC-SCNC: 8.8 MG/DL (ref 8.6–10.5)
CHLORIDE SERPL-SCNC: 108 MMOL/L (ref 98–107)
CO2 SERPL-SCNC: 23.2 MMOL/L (ref 22–29)
CREAT BLD-MCNC: 0.79 MG/DL (ref 0.57–1)
DEPRECATED RDW RBC AUTO: 40.7 FL (ref 37–54)
EOSINOPHIL # BLD AUTO: 0.14 10*3/MM3 (ref 0–0.4)
EOSINOPHIL NFR BLD AUTO: 0.9 % (ref 0.3–6.2)
ERYTHROCYTE [DISTWIDTH] IN BLOOD BY AUTOMATED COUNT: 14.5 % (ref 12.3–15.4)
GFR SERPL CREATININE-BSD FRML MDRD: 79 ML/MIN/1.73
GLOBULIN UR ELPH-MCNC: 3.1 GM/DL
GLUCOSE BLD-MCNC: 88 MG/DL (ref 65–99)
HCT VFR BLD AUTO: 37.1 % (ref 34–46.6)
HGB BLD-MCNC: 11.9 G/DL (ref 12–15.9)
IMM GRANULOCYTES # BLD AUTO: 0.09 10*3/MM3 (ref 0–0.05)
IMM GRANULOCYTES NFR BLD AUTO: 0.6 % (ref 0–0.5)
LYMPHOCYTES # BLD AUTO: 4.1 10*3/MM3 (ref 0.7–3.1)
LYMPHOCYTES NFR BLD AUTO: 27.7 % (ref 19.6–45.3)
MCH RBC QN AUTO: 25.1 PG (ref 26.6–33)
MCHC RBC AUTO-ENTMCNC: 32.1 G/DL (ref 31.5–35.7)
MCV RBC AUTO: 78.3 FL (ref 79–97)
MONOCYTES # BLD AUTO: 0.87 10*3/MM3 (ref 0.1–0.9)
MONOCYTES NFR BLD AUTO: 5.9 % (ref 5–12)
NEUTROPHILS # BLD AUTO: 9.54 10*3/MM3 (ref 1.7–7)
NEUTROPHILS NFR BLD AUTO: 64.4 % (ref 42.7–76)
NRBC BLD AUTO-RTO: 0 /100 WBC (ref 0–0.2)
PLATELET # BLD AUTO: 514 10*3/MM3 (ref 140–450)
PMV BLD AUTO: 9.8 FL (ref 6–12)
POTASSIUM BLD-SCNC: 4.4 MMOL/L (ref 3.5–5.2)
PROT SERPL-MCNC: 6.8 G/DL (ref 6–8.5)
RBC # BLD AUTO: 4.74 10*6/MM3 (ref 3.77–5.28)
SODIUM BLD-SCNC: 145 MMOL/L (ref 136–145)
WBC NRBC COR # BLD: 14.82 10*3/MM3 (ref 3.4–10.8)

## 2019-12-17 PROCEDURE — 80053 COMPREHEN METABOLIC PANEL: CPT | Performed by: NURSE PRACTITIONER

## 2019-12-17 PROCEDURE — 99214 OFFICE O/P EST MOD 30 MIN: CPT | Performed by: NURSE PRACTITIONER

## 2019-12-17 PROCEDURE — 85025 COMPLETE CBC W/AUTO DIFF WBC: CPT | Performed by: NURSE PRACTITIONER

## 2019-12-17 PROCEDURE — 36415 COLL VENOUS BLD VENIPUNCTURE: CPT | Performed by: NURSE PRACTITIONER

## 2019-12-17 PROCEDURE — 86480 TB TEST CELL IMMUN MEASURE: CPT | Performed by: NURSE PRACTITIONER

## 2019-12-17 NOTE — PATIENT INSTRUCTIONS

## 2019-12-17 NOTE — PROGRESS NOTES
Chief Complaint   Patient presents with   • Crohn's Disease       Subjective    Suzi Heck is a 44 y.o. female. she is here today for follow-up.    History of Present Illness  44-year-old female presents to discuss Crohn's disease.  States she finished steroid taper last week and symptoms immediately returned having multiple bowel movements per day with abdominal pain and tenderness.  Previously she had MR enterography and capsule endoscopy which were also consistent with Crohn's disease.  Her previous CT noted dilated loops of small bowel with enlarged mesenteric lymph nodes.  Symptoms did not improve with antibiotics.  We started patient on apriso and there is no significant improvement in symptoms.       The following portions of the patient's history were reviewed and updated as appropriate:   Past Medical History:   Diagnosis Date   • Abdominal pain     right lower quadrant      • Allergy     seasonal   • Conjunctivitis    • Encounter for routine gynecological examination    • Gastroesophageal reflux disease    • Irritable bowel syndrome with diarrhea    • Pigmented skin lesion    • PONV (postoperative nausea and vomiting)    • Screening examination for venereal disease    • Viral gastroenteritis      Past Surgical History:   Procedure Laterality Date   •  SECTION  2007    (1) - Primary low transverse C section   • CHOLECYSTECTOMY     • COLONOSCOPY N/A 2019    Procedure: COLONOSCOPY;  Surgeon: Mylse Ferraro MD;  Location: [facility] ENDOSCOPY;  Service: Gastroenterology   • ENDOSCOPY N/A 2019    Procedure: ESOPHAGOGASTRODUODENOSCOPY;  Surgeon: Myles Ferraro MD;  Location: [facility] ENDOSCOPY;  Service: Gastroenterology   • INJECTION OF MEDICATION  2015    Zofran (1) - TRINA Whittington   • OTHER SURGICAL HISTORY       Foot/toes surgery procedure (1)  -  x 2   • OTHER SURGICAL HISTORY  2007    EXAM OF CERVIX W/SCOPE 99324 (1) - mild dysplasia(JOANIE I) Squamoglandular  "mucosa. Chronic cervicitis. Curettings, Endocervix: unremarkable columnar cell epithelium   • OTHER SURGICAL HISTORY  2016    Biopsy, Each Additional Lesion 64553 (1) - LONNIE Maldonado   • OTHER SURGICAL HISTORY  2010    Remove Impacted Cerumen 50840 (1) - DIONISIO Eaton   • PAP SMEAR  10/15/2007    (1) - Epithelial cell abnormality: squamous cells, Atypical squamouscells of undetermined significance. cannot exclude HSIL/(ASC-H)    • SKIN BIOPSY  2016    Biopsy of Skin 43424 (1) - LONNIE Maldonado   • THUMB CARPOMETACARPAL JOINT ARTHROPLASTY FLEXOR CARPI RADIALIS TENDON Right 2017    Procedure: THUMB CARPOMETACARPAL JOINT ARTHROPLASTY FLEXOR CARPI RADIALIS TENDON RELEASE  EXCISION OF GANGLLION AND RELEASE OF A1 PULLEY WRIST EXTENSOR DECREASED VEINS DISEASE;  Surgeon: Kolton Emery MD;  Location: [facility];  Service:      Family History   Problem Relation Age of Onset   • Diabetes Father    • Prostate cancer Father    • Pancreatic cancer Father      OB History        1    Para   1    Term   1            AB        Living   1       SAB        TAB        Ectopic        Molar        Multiple        Live Births   1              Prior to Admission medications    Medication Sig Start Date End Date Taking? Authorizing Provider   mesalamine (APRISO) 0.375 g 24 hr capsule Take 4 capsules by mouth Daily. 10/22/19  Yes Maria Elena De La Cruz APRN   omeprazole (priLOSEC) 40 MG capsule Take 1 capsule by mouth Daily. 19  Yes Nicholas Maldonado MD   Budesonide (ENTOCORT EC) 3 MG 24 hr capsule 3 tablets by mouth daily for 6 weeks, then 2 tablets by mouth daily for 2 weeks 10/22/19   Maria Elena De La Cruz APRN     Allergies   Allergen Reactions   • Adhesive Tape Other (See Comments)     blisters   • Codeine      UNKNOWN REACTION   • Demerol [Meperidine] Mental Status Change   • Vicodin [Hydrocodone-Acetaminophen] Other (See Comments)     \"non functional\"     Social History     Socioeconomic History   • Marital status:      " "Spouse name: Not on file   • Number of children: Not on file   • Years of education: Not on file   • Highest education level: Not on file   Tobacco Use   • Smoking status: Current Every Day Smoker     Packs/day: 0.75     Years: 25.00     Pack years: 18.75     Types: Cigarettes     Start date: 1/17/1990   • Smokeless tobacco: Never Used   • Tobacco comment: Current Smoker -    Substance and Sexual Activity   • Alcohol use: No   • Drug use: No   • Sexual activity: Yes     Partners: Male     Birth control/protection: Surgical     Comment: Vasectomy       Review of Systems  Review of Systems   Constitutional: Positive for activity change, appetite change and fatigue. Negative for chills, diaphoresis, fever and unexpected weight change.   HENT: Negative for sore throat and trouble swallowing.    Respiratory: Negative for shortness of breath.    Gastrointestinal: Positive for abdominal pain and diarrhea (slightly more formed). Negative for abdominal distention, anal bleeding, blood in stool, constipation, nausea, rectal pain and vomiting.   Musculoskeletal: Negative for arthralgias.   Skin: Negative for pallor.   Neurological: Negative for light-headedness.        /68 (BP Location: Left arm)   Pulse 84   Ht 162.6 cm (64\")   Wt 102 kg (224 lb 3.2 oz)   BMI 38.48 kg/m²     Objective    Physical Exam   Constitutional: She is oriented to person, place, and time. She appears well-developed and well-nourished. She is cooperative. No distress.   HENT:   Head: Normocephalic and atraumatic.   Neck: Normal range of motion. Neck supple. No thyromegaly present.   Cardiovascular: Normal rate, regular rhythm and normal heart sounds.   Pulmonary/Chest: Effort normal and breath sounds normal. She has no wheezes. She has no rhonchi. She has no rales.   Abdominal: Soft. Normal appearance and bowel sounds are normal. She exhibits no distension. There is no hepatosplenomegaly. There is generalized tenderness. There is no rigidity " and no guarding. No hernia.   Lymphadenopathy:     She has no cervical adenopathy.   Neurological: She is alert and oriented to person, place, and time.   Skin: Skin is warm, dry and intact. No rash noted. No pallor.   Psychiatric: She has a normal mood and affect. Her speech is normal.     Office Visit on 10/22/2019   Component Date Value Ref Range Status   • Glucose 10/22/2019 80  65 - 99 mg/dL Final   • BUN 10/22/2019 9  6 - 20 mg/dL Final   • Creatinine 10/22/2019 0.71  0.57 - 1.00 mg/dL Final   • Sodium 10/22/2019 142  136 - 145 mmol/L Final   • Potassium 10/22/2019 4.8  3.5 - 5.2 mmol/L Final   • Chloride 10/22/2019 108* 98 - 107 mmol/L Final   • CO2 10/22/2019 23.8  22.0 - 29.0 mmol/L Final   • Calcium 10/22/2019 9.0  8.6 - 10.5 mg/dL Final   • Total Protein 10/22/2019 7.2  6.0 - 8.5 g/dL Final   • Albumin 10/22/2019 4.00  3.50 - 5.20 g/dL Final   • ALT (SGPT) 10/22/2019 11  1 - 33 U/L Final   • AST (SGOT) 10/22/2019 9  1 - 32 U/L Final   • Alkaline Phosphatase 10/22/2019 60  39 - 117 U/L Final   • Total Bilirubin 10/22/2019 <0.2* 0.2 - 1.2 mg/dL Final   • eGFR Non African Amer 10/22/2019 90  >60 mL/min/1.73 Final   • Globulin 10/22/2019 3.2  gm/dL Final   • A/G Ratio 10/22/2019 1.3  g/dL Final   • BUN/Creatinine Ratio 10/22/2019 12.7  7.0 - 25.0 Final   • Anion Gap 10/22/2019 10.2  5.0 - 15.0 mmol/L Final   • WBC 10/22/2019 14.29* 3.40 - 10.80 10*3/mm3 Final   • RBC 10/22/2019 4.79  3.77 - 5.28 10*6/mm3 Final   • Hemoglobin 10/22/2019 12.3  12.0 - 15.9 g/dL Final   • Hematocrit 10/22/2019 38.6  34.0 - 46.6 % Final   • MCV 10/22/2019 80.6  79.0 - 97.0 fL Final   • MCH 10/22/2019 25.7* 26.6 - 33.0 pg Final   • MCHC 10/22/2019 31.9  31.5 - 35.7 g/dL Final   • RDW 10/22/2019 15.2  12.3 - 15.4 % Final   • RDW-SD 10/22/2019 43.9  37.0 - 54.0 fl Final   • MPV 10/22/2019 10.4  6.0 - 12.0 fL Final   • Platelets 10/22/2019 469* 140 - 450 10*3/mm3 Final   • Neutrophil % 10/22/2019 65.3  42.7 - 76.0 % Final   •  Lymphocyte % 10/22/2019 26.0  19.6 - 45.3 % Final   • Monocyte % 10/22/2019 5.9  5.0 - 12.0 % Final   • Eosinophil % 10/22/2019 1.6  0.3 - 6.2 % Final   • Basophil % 10/22/2019 0.6  0.0 - 1.5 % Final   • Immature Grans % 10/22/2019 0.6* 0.0 - 0.5 % Final   • Neutrophils, Absolute 10/22/2019 9.34* 1.70 - 7.00 10*3/mm3 Final   • Lymphocytes, Absolute 10/22/2019 3.72* 0.70 - 3.10 10*3/mm3 Final   • Monocytes, Absolute 10/22/2019 0.84  0.10 - 0.90 10*3/mm3 Final   • Eosinophils, Absolute 10/22/2019 0.23  0.00 - 0.40 10*3/mm3 Final   • Basophils, Absolute 10/22/2019 0.08  0.00 - 0.20 10*3/mm3 Final   • Immature Grans, Absolute 10/22/2019 0.08* 0.00 - 0.05 10*3/mm3 Final   • nRBC 10/22/2019 0.0  0.0 - 0.2 /100 WBC Final     Assessment/Plan      1. Crohn's disease of small intestine with other complication (CMS/HCC)    .   Discussed risk and benefit of biologic therapy patient is agreeable to start Humira due to moderate to severe Crohn disease which is not improved with mesalamine and steroid therapy.  Will obtain lab work today in order starter pack patient should contact office once medication is received.    Orders placed during this encounter include:  Orders Placed This Encounter   Procedures   • Comprehensive Metabolic Panel   • QuantiFERON TB Gold   • CBC Auto Differential   • CBC & Differential     Order Specific Question:   Manual Differential     Answer:   No       * Surgery not found *    Review and/or summary of lab tests, radiology, procedures, medications. Review and summary of old records and obtaining of history. The risks and benefits of my recommendations, as well as other treatment options were discussed with the patient today. Questions were answered.    New Medications Ordered This Visit   Medications   • Adalimumab (HUMIRA PEN-CD/UC/HS STARTER) 80 MG/0.8ML injection     Sig: Inject 0.8 mL under the skin into the appropriate area as directed See Admin Instructions. 2 pens on day one and 1 pen on day  15     Dispense:  3 each     Refill:  0       Follow-up: Return in about 4 weeks (around 1/14/2020) for Recheck.          This document has been electronically signed by PEDRO Smith on December 20, 2019 10:48 AM             Results for orders placed or performed in visit on 12/17/19   QuantiFERON TB Gold   Result Value Ref Range    QuantiFERON Incubation Incubation performed.     QuantiFERON Criteria Comment     QUANTIFERON TB1 AG VALUE 0.04 IU/mL    QUANTIFERON TB2 AG VALUE 0.03 IU/mL    QuantiFERON Nil Value 0.03 IU/mL    QuantiFERON Mitogen Value >10.00 IU/mL    QUANTIFERON-TB GOLD PLUS Negative Negative   CBC Auto Differential   Result Value Ref Range    WBC 14.82 (H) 3.40 - 10.80 10*3/mm3    RBC 4.74 3.77 - 5.28 10*6/mm3    Hemoglobin 11.9 (L) 12.0 - 15.9 g/dL    Hematocrit 37.1 34.0 - 46.6 %    MCV 78.3 (L) 79.0 - 97.0 fL    MCH 25.1 (L) 26.6 - 33.0 pg    MCHC 32.1 31.5 - 35.7 g/dL    RDW 14.5 12.3 - 15.4 %    RDW-SD 40.7 37.0 - 54.0 fl    MPV 9.8 6.0 - 12.0 fL    Platelets 514 (H) 140 - 450 10*3/mm3    Neutrophil % 64.4 42.7 - 76.0 %    Lymphocyte % 27.7 19.6 - 45.3 %    Monocyte % 5.9 5.0 - 12.0 %    Eosinophil % 0.9 0.3 - 6.2 %    Basophil % 0.5 0.0 - 1.5 %    Immature Grans % 0.6 (H) 0.0 - 0.5 %    Neutrophils, Absolute 9.54 (H) 1.70 - 7.00 10*3/mm3    Lymphocytes, Absolute 4.10 (H) 0.70 - 3.10 10*3/mm3    Monocytes, Absolute 0.87 0.10 - 0.90 10*3/mm3    Eosinophils, Absolute 0.14 0.00 - 0.40 10*3/mm3    Basophils, Absolute 0.08 0.00 - 0.20 10*3/mm3    Immature Grans, Absolute 0.09 (H) 0.00 - 0.05 10*3/mm3    nRBC 0.0 0.0 - 0.2 /100 WBC   Comprehensive Metabolic Panel   Result Value Ref Range    Glucose 88 65 - 99 mg/dL    BUN 11 6 - 20 mg/dL    Creatinine 0.79 0.57 - 1.00 mg/dL    Sodium 145 136 - 145 mmol/L    Potassium 4.4 3.5 - 5.2 mmol/L    Chloride 108 (H) 98 - 107 mmol/L    CO2 23.2 22.0 - 29.0 mmol/L    Calcium 8.8 8.6 - 10.5 mg/dL    Total Protein 6.8 6.0 - 8.5 g/dL    Albumin 3.70 3.50 -  5.20 g/dL    ALT (SGPT) 14 1 - 33 U/L    AST (SGOT) 12 1 - 32 U/L    Alkaline Phosphatase 67 39 - 117 U/L    Total Bilirubin 0.2 0.2 - 1.2 mg/dL    eGFR Non African Amer 79 >60 mL/min/1.73    Globulin 3.1 gm/dL    A/G Ratio 1.2 g/dL    BUN/Creatinine Ratio 13.9 7.0 - 25.0    Anion Gap 13.8 5.0 - 15.0 mmol/L   Results for orders placed or performed in visit on 10/22/19   CBC Auto Differential   Result Value Ref Range    WBC 14.29 (H) 3.40 - 10.80 10*3/mm3    RBC 4.79 3.77 - 5.28 10*6/mm3    Hemoglobin 12.3 12.0 - 15.9 g/dL    Hematocrit 38.6 34.0 - 46.6 %    MCV 80.6 79.0 - 97.0 fL    MCH 25.7 (L) 26.6 - 33.0 pg    MCHC 31.9 31.5 - 35.7 g/dL    RDW 15.2 12.3 - 15.4 %    RDW-SD 43.9 37.0 - 54.0 fl    MPV 10.4 6.0 - 12.0 fL    Platelets 469 (H) 140 - 450 10*3/mm3    Neutrophil % 65.3 42.7 - 76.0 %    Lymphocyte % 26.0 19.6 - 45.3 %    Monocyte % 5.9 5.0 - 12.0 %    Eosinophil % 1.6 0.3 - 6.2 %    Basophil % 0.6 0.0 - 1.5 %    Immature Grans % 0.6 (H) 0.0 - 0.5 %    Neutrophils, Absolute 9.34 (H) 1.70 - 7.00 10*3/mm3    Lymphocytes, Absolute 3.72 (H) 0.70 - 3.10 10*3/mm3    Monocytes, Absolute 0.84 0.10 - 0.90 10*3/mm3    Eosinophils, Absolute 0.23 0.00 - 0.40 10*3/mm3    Basophils, Absolute 0.08 0.00 - 0.20 10*3/mm3    Immature Grans, Absolute 0.08 (H) 0.00 - 0.05 10*3/mm3    nRBC 0.0 0.0 - 0.2 /100 WBC   Comprehensive Metabolic Panel   Result Value Ref Range    Glucose 80 65 - 99 mg/dL    BUN 9 6 - 20 mg/dL    Creatinine 0.71 0.57 - 1.00 mg/dL    Sodium 142 136 - 145 mmol/L    Potassium 4.8 3.5 - 5.2 mmol/L    Chloride 108 (H) 98 - 107 mmol/L    CO2 23.8 22.0 - 29.0 mmol/L    Calcium 9.0 8.6 - 10.5 mg/dL    Total Protein 7.2 6.0 - 8.5 g/dL    Albumin 4.00 3.50 - 5.20 g/dL    ALT (SGPT) 11 1 - 33 U/L    AST (SGOT) 9 1 - 32 U/L    Alkaline Phosphatase 60 39 - 117 U/L    Total Bilirubin <0.2 (L) 0.2 - 1.2 mg/dL    eGFR Non African Amer 90 >60 mL/min/1.73    Globulin 3.2 gm/dL    A/G Ratio 1.3 g/dL    BUN/Creatinine Ratio  12.7 7.0 - 25.0    Anion Gap 10.2 5.0 - 15.0 mmol/L   Results for orders placed or performed during the hospital encounter of 10/01/19   Urinalysis With Culture If Indicated - Urine, Clean Catch   Result Value Ref Range    Color, UA Yellow Yellow, Straw, Dark Yellow, Luh    Appearance, UA Clear Clear    pH, UA 6.0 5.0 - 9.0    Specific Gravity, UA 1.024 1.003 - 1.030    Glucose, UA Negative Negative    Ketones, UA Negative Negative    Bilirubin, UA Negative Negative    Blood, UA Negative Negative    Protein, UA Negative Negative    Leuk Esterase, UA Negative Negative    Nitrite, UA Negative Negative    Urobilinogen, UA 0.2 E.U./dL 0.2 - 1.0 E.U./dL     *Note: Due to a large number of results and/or encounters for the requested time period, some results have not been displayed. A complete set of results can be found in Results Review.

## 2019-12-20 ENCOUNTER — OFFICE VISIT (OUTPATIENT)
Dept: OBSTETRICS AND GYNECOLOGY | Facility: CLINIC | Age: 44
End: 2019-12-20

## 2019-12-20 VITALS
BODY MASS INDEX: 38.58 KG/M2 | HEIGHT: 64 IN | WEIGHT: 226 LBS | DIASTOLIC BLOOD PRESSURE: 80 MMHG | SYSTOLIC BLOOD PRESSURE: 116 MMHG

## 2019-12-20 DIAGNOSIS — Z12.31 SCREENING MAMMOGRAM, ENCOUNTER FOR: ICD-10-CM

## 2019-12-20 DIAGNOSIS — R10.2 PELVIC PAIN: Primary | ICD-10-CM

## 2019-12-20 LAB
QUANTIFERON CRITERIA: NORMAL
QUANTIFERON INCUBATION: NORMAL
QUANTIFERON MITOGEN VALUE: >10 IU/ML
QUANTIFERON NIL VALUE: 0.03 IU/ML
QUANTIFERON TB1 AG VALUE: 0.04 IU/ML
QUANTIFERON TB2 AG VALUE: 0.03 IU/ML
QUANTIFERON-TB GOLD PLUS: NEGATIVE

## 2019-12-20 PROCEDURE — 99212 OFFICE O/P EST SF 10 MIN: CPT | Performed by: NURSE PRACTITIONER

## 2019-12-20 NOTE — PROGRESS NOTES
Subjective   Suzi Heck is a 44 y.o. here for pelvic pain, mammo    LMP: 12/15/2020, regular cycles  BC: None  Pap: 5/31/208, NIL neg HPV  Mammo: 5/13/2018, negative      Pt c/o of sharp, shooting, intermittent pelvic pain that has been a chronic problem for 5 years. She reports recently being diagnosed with Crohn's disease and has been told that the pain she is experiencing is unrelated to Crohn's. Per pt, she was advised to get a TVUS. Pt desires to know the cause of this pain before starting on Humira for her Crohn's. She also believes she is due for a Pap.     Pelvic Pain   The patient's primary symptoms include pelvic pain. The patient's pertinent negatives include no genital itching, genital lesions, genital odor, genital rash, missed menses, vaginal bleeding or vaginal discharge. This is a chronic problem. The problem occurs intermittently. The problem has been gradually worsening. The pain is moderate. Pertinent negatives include no abdominal pain, flank pain or nausea. The symptoms are aggravated by activity (menses).       The following portions of the patient's history were reviewed and updated as appropriate: allergies, current medications, past family history, past medical history, past social history, past surgical history and problem list.    Review of Systems   Constitutional: Negative for fatigue, unexpected weight gain and unexpected weight loss.   HENT: Negative for sneezing.    Respiratory: Negative for shortness of breath.    Cardiovascular: Negative for chest pain and palpitations.   Gastrointestinal: Negative for abdominal pain and nausea.   Endocrine: Negative for cold intolerance and heat intolerance.   Genitourinary: Positive for pelvic pain. Negative for breast discharge, breast lump, breast pain, difficulty urinating, flank pain, menstrual problem, missed menses, pelvic pressure, urinary incontinence, vaginal bleeding, vaginal discharge and vaginal pain.   Neurological: Negative  for weakness and headache.   Psychiatric/Behavioral: Positive for stress. Negative for sleep disturbance and depressed mood.        Moodiness       Objective   Physical Exam   Constitutional: She is oriented to person, place, and time. She appears well-developed and well-nourished.   HENT:   Head: Normocephalic.   Neck: Normal range of motion. Neck supple.   Cardiovascular: Normal rate and regular rhythm.   Pulmonary/Chest: Effort normal and breath sounds normal. Right breast exhibits no inverted nipple, no mass, no nipple discharge, no skin change and no tenderness. Left breast exhibits no inverted nipple, no mass, no nipple discharge, no skin change and no tenderness.   Abdominal: Soft. She exhibits shifting dullness.       Genitourinary:   Genitourinary Comments: Pelvic exam deferred as pt appeared to be in discomfort. Pt up to date on PAP.    Musculoskeletal: Normal range of motion.   Neurological: She is alert and oriented to person, place, and time.   Skin: Skin is warm and dry.   Psychiatric: Her behavior is normal.   Appears to be frustrated.   Nursing note and vitals reviewed.        Assessment/Plan   Suzi was seen today for gynecologic exam.    Diagnoses and all orders for this visit:    Pelvic pain  -     US Non-ob Transvaginal; Future    Screening mammogram, encounter for  -     Mammo Screening Digital Tomosynthesis Bilateral With CAD; Future      Discussed cytology guidelines with patient and she is due for a PAP in  May 2023. Will order TVUS and call patient of result of US when made available. Return as needed.

## 2020-11-04 RX ORDER — OMEPRAZOLE 40 MG/1
CAPSULE, DELAYED RELEASE ORAL
Qty: 90 CAPSULE | Refills: 3 | OUTPATIENT
Start: 2020-11-04

## 2021-01-11 ENCOUNTER — OFFICE VISIT (OUTPATIENT)
Dept: FAMILY MEDICINE CLINIC | Facility: CLINIC | Age: 46
End: 2021-01-11

## 2021-01-11 ENCOUNTER — LAB (OUTPATIENT)
Dept: LAB | Facility: HOSPITAL | Age: 46
End: 2021-01-11

## 2021-01-11 VITALS
BODY MASS INDEX: 36.23 KG/M2 | TEMPERATURE: 98 F | DIASTOLIC BLOOD PRESSURE: 76 MMHG | OXYGEN SATURATION: 98 % | SYSTOLIC BLOOD PRESSURE: 110 MMHG | WEIGHT: 212.2 LBS | HEIGHT: 64 IN | HEART RATE: 113 BPM

## 2021-01-11 DIAGNOSIS — Z00.00 ANNUAL PHYSICAL EXAM: Primary | ICD-10-CM

## 2021-01-11 DIAGNOSIS — D72.829 LEUKOCYTOSIS, UNSPECIFIED TYPE: ICD-10-CM

## 2021-01-11 DIAGNOSIS — K21.9 GASTROESOPHAGEAL REFLUX DISEASE, UNSPECIFIED WHETHER ESOPHAGITIS PRESENT: ICD-10-CM

## 2021-01-11 LAB
ALBUMIN SERPL-MCNC: 4.2 G/DL (ref 3.5–5.2)
ALBUMIN/GLOB SERPL: 1.7 G/DL
ALP SERPL-CCNC: 69 U/L (ref 39–117)
ALT SERPL W P-5'-P-CCNC: 9 U/L (ref 1–33)
ANION GAP SERPL CALCULATED.3IONS-SCNC: 9.7 MMOL/L (ref 5–15)
AST SERPL-CCNC: 10 U/L (ref 1–32)
BASOPHILS # BLD AUTO: 0.09 10*3/MM3 (ref 0–0.2)
BASOPHILS NFR BLD AUTO: 0.5 % (ref 0–1.5)
BILIRUB SERPL-MCNC: <0.2 MG/DL (ref 0–1.2)
BUN SERPL-MCNC: 11 MG/DL (ref 6–20)
BUN/CREAT SERPL: 14.1 (ref 7–25)
CALCIUM SPEC-SCNC: 9.2 MG/DL (ref 8.6–10.5)
CHLORIDE SERPL-SCNC: 106 MMOL/L (ref 98–107)
CHOLEST SERPL-MCNC: 156 MG/DL (ref 0–200)
CO2 SERPL-SCNC: 25.3 MMOL/L (ref 22–29)
CREAT SERPL-MCNC: 0.78 MG/DL (ref 0.57–1)
DEPRECATED RDW RBC AUTO: 41.7 FL (ref 37–54)
EOSINOPHIL # BLD AUTO: 0.12 10*3/MM3 (ref 0–0.4)
EOSINOPHIL NFR BLD AUTO: 0.7 % (ref 0.3–6.2)
ERYTHROCYTE [DISTWIDTH] IN BLOOD BY AUTOMATED COUNT: 15.3 % (ref 12.3–15.4)
GFR SERPL CREATININE-BSD FRML MDRD: 80 ML/MIN/1.73
GLOBULIN UR ELPH-MCNC: 2.5 GM/DL
GLUCOSE SERPL-MCNC: 98 MG/DL (ref 65–99)
HCT VFR BLD AUTO: 39.8 % (ref 34–46.6)
HDLC SERPL-MCNC: 44 MG/DL (ref 40–60)
HGB BLD-MCNC: 12.7 G/DL (ref 12–15.9)
IMM GRANULOCYTES # BLD AUTO: 0.1 10*3/MM3 (ref 0–0.05)
IMM GRANULOCYTES NFR BLD AUTO: 0.6 % (ref 0–0.5)
LDLC SERPL CALC-MCNC: 74 MG/DL (ref 0–100)
LDLC/HDLC SERPL: 1.51 {RATIO}
LYMPHOCYTES # BLD AUTO: 3.43 10*3/MM3 (ref 0.7–3.1)
LYMPHOCYTES NFR BLD AUTO: 20.4 % (ref 19.6–45.3)
MCH RBC QN AUTO: 24.7 PG (ref 26.6–33)
MCHC RBC AUTO-ENTMCNC: 31.9 G/DL (ref 31.5–35.7)
MCV RBC AUTO: 77.4 FL (ref 79–97)
MONOCYTES # BLD AUTO: 0.88 10*3/MM3 (ref 0.1–0.9)
MONOCYTES NFR BLD AUTO: 5.2 % (ref 5–12)
NEUTROPHILS NFR BLD AUTO: 12.17 10*3/MM3 (ref 1.7–7)
NEUTROPHILS NFR BLD AUTO: 72.6 % (ref 42.7–76)
NRBC BLD AUTO-RTO: 0 /100 WBC (ref 0–0.2)
PLATELET # BLD AUTO: 581 10*3/MM3 (ref 140–450)
PMV BLD AUTO: 9.9 FL (ref 6–12)
POTASSIUM SERPL-SCNC: 3.8 MMOL/L (ref 3.5–5.2)
PROT SERPL-MCNC: 6.7 G/DL (ref 6–8.5)
RBC # BLD AUTO: 5.14 10*6/MM3 (ref 3.77–5.28)
SODIUM SERPL-SCNC: 141 MMOL/L (ref 136–145)
TRIGL SERPL-MCNC: 228 MG/DL (ref 0–150)
VLDLC SERPL-MCNC: 38 MG/DL (ref 5–40)
WBC # BLD AUTO: 16.79 10*3/MM3 (ref 3.4–10.8)

## 2021-01-11 PROCEDURE — 80053 COMPREHEN METABOLIC PANEL: CPT | Performed by: FAMILY MEDICINE

## 2021-01-11 PROCEDURE — 85025 COMPLETE CBC W/AUTO DIFF WBC: CPT | Performed by: FAMILY MEDICINE

## 2021-01-11 PROCEDURE — 99396 PREV VISIT EST AGE 40-64: CPT | Performed by: FAMILY MEDICINE

## 2021-01-11 PROCEDURE — 80061 LIPID PANEL: CPT | Performed by: FAMILY MEDICINE

## 2021-01-11 RX ORDER — OMEPRAZOLE 40 MG/1
40 CAPSULE, DELAYED RELEASE ORAL DAILY
Qty: 90 CAPSULE | Refills: 3 | Status: SHIPPED | OUTPATIENT
Start: 2021-01-11

## 2021-01-11 NOTE — PROGRESS NOTES
" Subjective   Suzi Heck is a 45 y.o. female.     Chief Complaint   Patient presents with   • Med Refill             History of Present Illness     Cc:  Annual exam.    Doing well.  Due for mammogram and pap/pelvic.  Not interested at this time.  Has diagnosis of crohns disease and she says they wanted to put her on humira.  She decided to make diet changes only.   bp is good  In past, wbc has been elevated.   Needs refill omeprazole for her gerd.     Review of Systems   Constitutional: Negative for chills, fatigue and fever.   HENT: Negative for congestion, ear discharge, ear pain, facial swelling, hearing loss, postnasal drip, rhinorrhea, sinus pressure, sore throat, trouble swallowing and voice change.    Eyes: Negative for discharge, redness and visual disturbance.   Respiratory: Negative for cough, chest tightness, shortness of breath and wheezing.    Cardiovascular: Negative for chest pain and palpitations.   Gastrointestinal: Negative for abdominal pain, blood in stool, constipation, diarrhea, nausea and vomiting.   Endocrine: Negative for polydipsia and polyuria.   Genitourinary: Negative for dysuria, flank pain, hematuria and urgency.   Musculoskeletal: Negative for arthralgias, back pain, joint swelling and myalgias.   Skin: Negative for rash.   Neurological: Negative for dizziness, weakness, numbness and headaches.   Hematological: Negative for adenopathy.   Psychiatric/Behavioral: Negative for confusion and sleep disturbance. The patient is not nervous/anxious.            /76 (BP Location: Left arm, Patient Position: Sitting, Cuff Size: Adult)   Pulse 113   Temp 98 °F (36.7 °C) (Infrared)   Ht 162.6 cm (64.02\")   Wt 96.3 kg (212 lb 3.2 oz)   SpO2 98%   BMI 36.41 kg/m²       Objective     Physical Exam  Vitals signs and nursing note reviewed.   Constitutional:       Appearance: Normal appearance. She is well-developed.   HENT:      Head: Normocephalic and atraumatic.      Right Ear: " External ear normal.      Left Ear: External ear normal.      Nose: Nose normal. No rhinorrhea.   Eyes:      General: No scleral icterus.     Extraocular Movements: Extraocular movements intact.      Conjunctiva/sclera: Conjunctivae normal.      Pupils: Pupils are equal, round, and reactive to light.   Neck:      Musculoskeletal: Normal range of motion and neck supple.   Cardiovascular:      Rate and Rhythm: Normal rate and regular rhythm.      Heart sounds: Normal heart sounds. No friction rub. No gallop.    Pulmonary:      Effort: Pulmonary effort is normal.      Breath sounds: Normal breath sounds.   Abdominal:      General: Bowel sounds are normal. There is no distension.      Palpations: Abdomen is soft.      Tenderness: There is no abdominal tenderness.   Musculoskeletal: Normal range of motion.         General: No deformity.   Skin:     General: Skin is warm and dry.      Findings: No erythema or rash.   Neurological:      Mental Status: She is alert and oriented to person, place, and time.      Cranial Nerves: No cranial nerve deficit.   Psychiatric:         Behavior: Behavior normal.         Thought Content: Thought content normal.         Judgment: Judgment normal.             PAST MEDICAL HISTORY     Past Medical History:   Diagnosis Date   • Abdominal pain     right lower quadrant      • Allergy     seasonal   • Conjunctivitis    • Encounter for routine gynecological examination    • Gastroesophageal reflux disease    • Irritable bowel syndrome with diarrhea    • Pigmented skin lesion    • PONV (postoperative nausea and vomiting)    • Screening examination for venereal disease    • Viral gastroenteritis       PAST SURGICAL HISTORY     Past Surgical History:   Procedure Laterality Date   •  SECTION  2007    (1) - Primary low transverse C section   • CHOLECYSTECTOMY     • COLONOSCOPY N/A 2019    Procedure: COLONOSCOPY;  Surgeon: Myles Ferraro MD;  Location: [facility] ENDOSCOPY;   Service: Gastroenterology   • ENDOSCOPY N/A 9/18/2019    Procedure: ESOPHAGOGASTRODUODENOSCOPY;  Surgeon: Myles Ferraro MD;  Location: [facility] ENDOSCOPY;  Service: Gastroenterology   • INJECTION OF MEDICATION  06/08/2015    Zofran (1) - TRINA Whittington   • OTHER SURGICAL HISTORY       Foot/toes surgery procedure (1)  -  x 2   • OTHER SURGICAL HISTORY  11/01/2007    EXAM OF CERVIX W/SCOPE 39470 (1) - mild dysplasia(JOANIE I) Squamoglandular mucosa. Chronic cervicitis. Curettings, Endocervix: unremarkable columnar cell epithelium   • OTHER SURGICAL HISTORY  06/16/2016    Biopsy, Each Additional Lesion 02500 (1) - LONNIE aMldonado   • OTHER SURGICAL HISTORY  12/06/2010    Remove Impacted Cerumen 26246 (1) - DIONISIO Eaton   • PAP SMEAR  10/15/2007    (1) - Epithelial cell abnormality: squamous cells, Atypical squamouscells of undetermined significance. cannot exclude HSIL/(ASC-H)    • SKIN BIOPSY  06/16/2016    Biopsy of Skin 95104 (1) - LONNIE Maldonado   • THUMB CARPOMETACARPAL JOINT ARTHROPLASTY FLEXOR CARPI RADIALIS TENDON Right 4/13/2017    Procedure: THUMB CARPOMETACARPAL JOINT ARTHROPLASTY FLEXOR CARPI RADIALIS TENDON RELEASE  EXCISION OF GANGLLION AND RELEASE OF A1 PULLEY WRIST EXTENSOR DECREASED VEINS DISEASE;  Surgeon: Kolton Emery MD;  Location: [facility] OR;  Service:       SOCIAL HISTORY     Social History     Socioeconomic History   • Marital status:      Spouse name: Not on file   • Number of children: Not on file   • Years of education: Not on file   • Highest education level: Not on file   Tobacco Use   • Smoking status: Current Every Day Smoker     Packs/day: 0.75     Years: 25.00     Pack years: 18.75     Types: Cigarettes     Start date: 1/17/1990   • Smokeless tobacco: Never Used   • Tobacco comment: Current Smoker -    Substance and Sexual Activity   • Alcohol use: No   • Drug use: No   • Sexual activity: Yes     Partners: Male     Birth control/protection: Surgical     Comment: Vasectomy      ALLERGIES   Adhesive tape,  Codeine, Demerol [meperidine], and Vicodin [hydrocodone-acetaminophen]   MEDICATIONS     Current Outpatient Medications   Medication Sig Dispense Refill   • omeprazole (priLOSEC) 40 MG capsule Take 1 capsule by mouth Daily. 90 capsule 3   • Adalimumab (HUMIRA PEN-CD/UC/HS STARTER) 80 MG/0.8ML injection Inject 0.8 mL under the skin into the appropriate area as directed See Admin Instructions. 2 pens on day one and 1 pen on day 15 3 each 0   • Adalimumab (HUMIRA) 40 MG/0.4ML Pen-injector Kit Inject 40 mg under the skin into the appropriate area as directed Every 14 (Fourteen) Days. 2 each 11   • Budesonide (ENTOCORT EC) 3 MG 24 hr capsule 3 tablets by mouth daily for 6 weeks, then 2 tablets by mouth daily for 2 weeks 154 capsule 0   • mesalamine (APRISO) 0.375 g 24 hr capsule Take 4 capsules by mouth Daily. 120 capsule 5     No current facility-administered medications for this visit.         The following portions of the patient's history were reviewed and updated as appropriate: allergies, current medications, past family history, past medical history, past social history, past surgical history and problem list.        Assessment/Plan   Diagnoses and all orders for this visit:    1. Annual physical exam (Primary)  -     Cancel: CBC (No Diff)  -     Comprehensive Metabolic Panel  -     Lipid Panel  -     CBC & Differential    2. Leukocytosis, unspecified type  -     CBC & Differential    3. Gastroesophageal reflux disease, unspecified whether esophagitis present    Other orders  -     omeprazole (priLOSEC) 40 MG capsule; Take 1 capsule by mouth Daily.  Dispense: 90 capsule; Refill: 3      Refilled prilosec.    Told her I wanted labs to watch white count and its implications.  May be elevated due to crohns.     Encouraged to see kimberly for pap/pelvic and mammogram, etc.              No follow-ups on file.                  This document has been electronically signed by Nicholas Maldonado MD on January 11, 2021 08:35  CST

## 2021-01-11 NOTE — PATIENT INSTRUCTIONS
"BMI for Adults  What is BMI?  Body mass index (BMI) is a number that is calculated from a person's weight and height. BMI can help estimate how much of a person's weight is composed of fat. BMI does not measure body fat directly. Rather, it is an alternative to procedures that directly measure body fat, which can be difficult and expensive.  BMI can help identify people who may be at higher risk for certain medical problems.  What are BMI measurements used for?  BMI is used as a screening tool to identify possible weight problems. It helps determine whether a person is obese, overweight, a healthy weight, or underweight.  BMI is useful for:  · Identifying a weight problem that may be related to a medical condition or may increase the risk for medical problems.  · Promoting changes, such as changes in diet and exercise, to help reach a healthy weight. BMI screening can be repeated to see if these changes are working.  How is BMI calculated?  BMI involves measuring your weight in relation to your height. Both height and weight are measured, and the BMI is calculated from those numbers. This can be done either in English (U.S[place] or metric measurements. Note that charts and online BMI calculators are available to help you find your BMI quickly and easily without having to do these calculations yourself.  To calculate your BMI in English (U.S[place] measurements:    1. Measure your weight in pounds (lb).  2. Multiply the number of pounds by 703.  ? For example, for a person who weighs 180 lb, multiply that number by 703, which equals 126,540.  3. Measure your height in inches. Then multiply that number by itself to get a measurement called \"inches squared.\"  ? For example, for a person who is 70 inches tall, the \"inches squared\" measurement is 70 inches x 70 inches, which equals 4,900 inches squared.  4. Divide the total from step 2 (number of lb x 703) by the total from step 3 (inches squared): 126,540 ÷ 4,900 = 25.8. This is " "your BMI.  To calculate your BMI in metric measurements:  1. Measure your weight in kilograms (kg).  2. Measure your height in meters (m). Then multiply that number by itself to get a measurement called \"meters squared.\"  ? For example, for a person who is 1.75 m tall, the \"meters squared\" measurement is 1.75 m x 1.75 m, which is equal to 3.1 meters squared.  3. Divide the number of kilograms (your weight) by the meters squared number. In this example: 70 ÷ 3.1 = 22.6. This is your BMI.  What do the results mean?  BMI charts are used to identify whether you are underweight, normal weight, overweight, or obese. The following guidelines will be used:  · Underweight: BMI less than 18.5.  · Normal weight: BMI between 18.5 and 24.9.  · Overweight: BMI between 25 and 29.9.  · Obese: BMI of 30 or above.  Keep these notes in mind:  · Weight includes both fat and muscle, so someone with a muscular build, such as an athlete, may have a BMI that is higher than 24.9. In cases like these, BMI is not an accurate measure of body fat.  · To determine if excess body fat is the cause of a BMI of 25 or higher, further assessments may need to be done by a health care provider.  · BMI is usually interpreted in the same way for men and women.  Where to find more information  For more information about BMI, including tools to quickly calculate your BMI, go to these websites:  · Centers for Disease Control and Prevention: www.cdc.gov  · American Heart Association: www.heart.org  · National Heart, Lung, and Blood [place]: www.nhlbi.nih.gov  Summary  · Body mass index (BMI) is a number that is calculated from a person's weight and height.  · BMI may help estimate how much of a person's weight is composed of fat. BMI can help identify those who may be at higher risk for certain medical problems.  · BMI can be measured using English measurements or metric measurements.  · BMI charts are used to identify whether you are underweight, normal " weight, overweight, or obese.  This information is not intended to replace advice given to you by your health care provider. Make sure you discuss any questions you have with your health care provider.  Document Revised: 09/09/2020 Document Reviewed: 07/17/2020  Elsevier Patient Education © 2020 [employer]

## 2021-01-12 ENCOUNTER — TELEPHONE (OUTPATIENT)
Dept: ONCOLOGY | Facility: CLINIC | Age: 46
End: 2021-01-12

## 2021-01-12 ENCOUNTER — TELEPHONE (OUTPATIENT)
Dept: FAMILY MEDICINE CLINIC | Facility: CLINIC | Age: 46
End: 2021-01-12

## 2021-01-12 DIAGNOSIS — D72.829 LEUKOCYTOSIS, UNSPECIFIED TYPE: Primary | ICD-10-CM

## 2021-01-12 DIAGNOSIS — D75.839 THROMBOCYTOSIS: ICD-10-CM

## 2021-01-12 NOTE — TELEPHONE ENCOUNTER
----- Message from Denise Hernandez MA sent at 1/12/2021 11:25 AM CST -----  Please put in referral to hematology

## 2021-01-19 ENCOUNTER — CONSULT (OUTPATIENT)
Dept: ONCOLOGY | Facility: CLINIC | Age: 46
End: 2021-01-19

## 2021-01-19 ENCOUNTER — TELEPHONE (OUTPATIENT)
Dept: ONCOLOGY | Facility: CLINIC | Age: 46
End: 2021-01-19

## 2021-01-19 ENCOUNTER — TELEPHONE (OUTPATIENT)
Dept: ONCOLOGY | Facility: HOSPITAL | Age: 46
End: 2021-01-19

## 2021-01-19 ENCOUNTER — LAB (OUTPATIENT)
Dept: ONCOLOGY | Facility: HOSPITAL | Age: 46
End: 2021-01-19

## 2021-01-19 VITALS
DIASTOLIC BLOOD PRESSURE: 67 MMHG | WEIGHT: 212 LBS | BODY MASS INDEX: 36.19 KG/M2 | RESPIRATION RATE: 16 BRPM | HEART RATE: 91 BPM | SYSTOLIC BLOOD PRESSURE: 117 MMHG | TEMPERATURE: 97.1 F | HEIGHT: 64 IN

## 2021-01-19 DIAGNOSIS — R71.8 MICROCYTOSIS: ICD-10-CM

## 2021-01-19 DIAGNOSIS — D72.829 LEUKOCYTOSIS, UNSPECIFIED TYPE: Primary | ICD-10-CM

## 2021-01-19 DIAGNOSIS — K90.9 IRON MALABSORPTION: ICD-10-CM

## 2021-01-19 DIAGNOSIS — E66.01 MORBIDLY OBESE (HCC): ICD-10-CM

## 2021-01-19 DIAGNOSIS — D75.839 THROMBOCYTOSIS: ICD-10-CM

## 2021-01-19 DIAGNOSIS — D72.829 LEUKOCYTOSIS, UNSPECIFIED TYPE: ICD-10-CM

## 2021-01-19 DIAGNOSIS — D50.8 OTHER IRON DEFICIENCY ANEMIA: ICD-10-CM

## 2021-01-19 PROBLEM — D50.9 IRON DEFICIENCY ANEMIA: Status: ACTIVE | Noted: 2021-01-19

## 2021-01-19 PROBLEM — D64.9 ABSOLUTE ANEMIA: Status: ACTIVE | Noted: 2021-01-19

## 2021-01-19 LAB
BASOPHILS # BLD AUTO: 0.09 10*3/MM3 (ref 0–0.2)
BASOPHILS NFR BLD AUTO: 0.6 % (ref 0–1.5)
DEPRECATED RDW RBC AUTO: 45.1 FL (ref 37–54)
EOSINOPHIL # BLD AUTO: 0.17 10*3/MM3 (ref 0–0.4)
EOSINOPHIL NFR BLD AUTO: 1.1 % (ref 0.3–6.2)
ERYTHROCYTE [DISTWIDTH] IN BLOOD BY AUTOMATED COUNT: 16.3 % (ref 12.3–15.4)
FERRITIN SERPL-MCNC: 16.88 NG/ML (ref 13–150)
HCT VFR BLD AUTO: 36.3 % (ref 34–46.6)
HGB BLD-MCNC: 11.8 G/DL (ref 12–15.9)
IMM GRANULOCYTES # BLD AUTO: 0.07 10*3/MM3 (ref 0–0.05)
IMM GRANULOCYTES NFR BLD AUTO: 0.4 % (ref 0–0.5)
IRON 24H UR-MRATE: 29 MCG/DL (ref 37–145)
IRON SATN MFR SERPL: 7 % (ref 20–50)
LYMPHOCYTES # BLD AUTO: 4.41 10*3/MM3 (ref 0.7–3.1)
LYMPHOCYTES NFR BLD AUTO: 27.3 % (ref 19.6–45.3)
MCH RBC QN AUTO: 25 PG (ref 26.6–33)
MCHC RBC AUTO-ENTMCNC: 32.5 G/DL (ref 31.5–35.7)
MCV RBC AUTO: 76.9 FL (ref 79–97)
MONOCYTES # BLD AUTO: 1.05 10*3/MM3 (ref 0.1–0.9)
MONOCYTES NFR BLD AUTO: 6.5 % (ref 5–12)
NEUTROPHILS NFR BLD AUTO: 10.39 10*3/MM3 (ref 1.7–7)
NEUTROPHILS NFR BLD AUTO: 64.1 % (ref 42.7–76)
NRBC BLD AUTO-RTO: 0 /100 WBC (ref 0–0.2)
PLATELET # BLD AUTO: 492 10*3/MM3 (ref 140–450)
PMV BLD AUTO: 9 FL (ref 6–12)
RBC # BLD AUTO: 4.72 10*6/MM3 (ref 3.77–5.28)
TIBC SERPL-MCNC: 441 MCG/DL (ref 298–536)
TRANSFERRIN SERPL-MCNC: 296 MG/DL (ref 200–360)
VIT B12 BLD-MCNC: 230 PG/ML (ref 211–946)
WBC # BLD AUTO: 16.18 10*3/MM3 (ref 3.4–10.8)

## 2021-01-19 PROCEDURE — 85025 COMPLETE CBC W/AUTO DIFF WBC: CPT

## 2021-01-19 PROCEDURE — 83540 ASSAY OF IRON: CPT

## 2021-01-19 PROCEDURE — 82728 ASSAY OF FERRITIN: CPT

## 2021-01-19 PROCEDURE — 82607 VITAMIN B-12: CPT

## 2021-01-19 PROCEDURE — G0463 HOSPITAL OUTPT CLINIC VISIT: HCPCS | Performed by: INTERNAL MEDICINE

## 2021-01-19 PROCEDURE — 99204 OFFICE O/P NEW MOD 45 MIN: CPT | Performed by: INTERNAL MEDICINE

## 2021-01-19 PROCEDURE — 84466 ASSAY OF TRANSFERRIN: CPT

## 2021-01-19 RX ORDER — DIPHENHYDRAMINE HCL 25 MG
25 CAPSULE ORAL ONCE
Status: CANCELLED | OUTPATIENT
Start: 2021-01-25

## 2021-01-19 RX ORDER — FAMOTIDINE 20 MG/1
20 TABLET, FILM COATED ORAL ONCE
Status: CANCELLED | OUTPATIENT
Start: 2021-01-25

## 2021-01-19 RX ORDER — SODIUM CHLORIDE 9 MG/ML
250 INJECTION, SOLUTION INTRAVENOUS ONCE
Status: CANCELLED | OUTPATIENT
Start: 2021-01-25

## 2021-01-19 NOTE — PROGRESS NOTES
REASON FOR CONSULTATION:  Microcytosis, thrombocytosis   Provide an opinion on any further workup or treatment                             REQUESTING PHYSICIAN:  Nicholas Maldonado MD    RECORDS OBTAINED:  Records of the patients history including those obtained from the referring provider were reviewed and summarized in detail.    History of Present Illness     This is a pleasant 45-year-old female who was seen in consultation at the request of Nicholas Maldonado MD evaluation of microcytosis and thrombocytosis.  Patient unfortunately poor historian most of the history was obtained by reviewing old medical records.  Patient has past medical history of obesity, Crohn's disease, allergic rhinitis, chronic abdominal pain.  She had routine laboratory testing performed by her primary care provider on 2021 which showed numerous abnormalities in her CBC.  She was noticed to have microcytosis with MCV of 77.4.  She was also noticed to have leukocytosis with white blood cell count of 16.79 and thrombocytosis with platelet count of 5,081,000.  Differential showed neutrophilia and lymphocytosis.  However monocyte, eosinophils and basophils were within normal limits.  He denies any prior history of arterial venous thrombosis.  Denies any prior history of iron deficiency anemia.  No B symptoms.  I been asked to assist with evaluation and management of her microcytosis and thrombocytosis.          Past Medical History:   Diagnosis Date   • Abdominal pain     right lower quadrant      • Absolute anemia 2021   • Allergy     seasonal   • Conjunctivitis    • Encounter for routine gynecological examination    • Gastroesophageal reflux disease    • Irritable bowel syndrome with diarrhea    • Pigmented skin lesion    • PONV (postoperative nausea and vomiting)    • Screening examination for venereal disease    • Viral gastroenteritis         Past Surgical History:   Procedure Laterality Date   •  SECTION   04/05/2007    (1) - Primary low transverse C section   • CHOLECYSTECTOMY  2005   • COLONOSCOPY N/A 9/18/2019    Procedure: COLONOSCOPY;  Surgeon: Myles Ferraro MD;  Location: [facility] ENDOSCOPY;  Service: Gastroenterology   • ENDOSCOPY N/A 9/18/2019    Procedure: ESOPHAGOGASTRODUODENOSCOPY;  Surgeon: Myles Ferraro MD;  Location: [facility] ENDOSCOPY;  Service: Gastroenterology   • INJECTION OF MEDICATION  06/08/2015    Zofran (1) - TRINA Whittington   • OTHER SURGICAL HISTORY       Foot/toes surgery procedure (1)  -  x 2   • OTHER SURGICAL HISTORY  11/01/2007    EXAM OF CERVIX W/SCOPE 21457 (1) - mild dysplasia(JOANIE I) Squamoglandular mucosa. Chronic cervicitis. Curettings, Endocervix: unremarkable columnar cell epithelium   • OTHER SURGICAL HISTORY  06/16/2016    Biopsy, Each Additional Lesion 61886 (1) Ralph Maldonado   • OTHER SURGICAL HISTORY  12/06/2010    Remove Impacted Cerumen 64933 (1) - DIONISIO Eaton   • PAP SMEAR  10/15/2007    (1) - Epithelial cell abnormality: squamous cells, Atypical squamouscells of undetermined significance. cannot exclude HSIL/(ASC-H)    • SKIN BIOPSY  06/16/2016    Biopsy of Skin 56024 (1) - LONNIE Maldonado   • THUMB CARPOMETACARPAL JOINT ARTHROPLASTY FLEXOR CARPI RADIALIS TENDON Right 4/13/2017    Procedure: THUMB CARPOMETACARPAL JOINT ARTHROPLASTY FLEXOR CARPI RADIALIS TENDON RELEASE  EXCISION OF GANGLLION AND RELEASE OF A1 PULLEY WRIST EXTENSOR DECREASED VEINS DISEASE;  Surgeon: Kolton Emery MD;  Location: [facility] OR;  Service:         Current Outpatient Medications on File Prior to Visit   Medication Sig Dispense Refill   • Adalimumab (HUMIRA PEN-CD/UC/HS STARTER) 80 MG/0.8ML injection Inject 0.8 mL under the skin into the appropriate area as directed See Admin Instructions. 2 pens on day one and 1 pen on day 15 3 each 0   • Adalimumab (HUMIRA) 40 MG/0.4ML Pen-injector Kit Inject 40 mg under the skin into the appropriate area as directed Every 14 (Fourteen) Days. 2 each 11   • omeprazole (priLOSEC) 40  "MG capsule Take 1 capsule by mouth Daily. 90 capsule 3   • [DISCONTINUED] Budesonide (ENTOCORT EC) 3 MG 24 hr capsule 3 tablets by mouth daily for 6 weeks, then 2 tablets by mouth daily for 2 weeks 154 capsule 0   • [DISCONTINUED] mesalamine (APRISO) 0.375 g 24 hr capsule Take 4 capsules by mouth Daily. 120 capsule 5     No current facility-administered medications on file prior to visit.         ALLERGIES:    Allergies   Allergen Reactions   • Adhesive Tape Other (See Comments)     blisters   • Codeine      UNKNOWN REACTION   • Demerol [Meperidine] Mental Status Change   • Vicodin [Hydrocodone-Acetaminophen] Other (See Comments)     \"non functional\"        Social History     Socioeconomic History   • Marital status:      Spouse name: Not on file   • Number of children: Not on file   • Years of education: Not on file   • Highest education level: Not on file   Tobacco Use   • Smoking status: Current Every Day Smoker     Packs/day: 0.75     Years: 25.00     Pack years: 18.75     Types: Cigarettes     Start date: 1/17/1990   • Smokeless tobacco: Never Used   • Tobacco comment: Current Smoker -    Substance and Sexual Activity   • Alcohol use: No   • Drug use: No   • Sexual activity: Yes     Partners: Male     Birth control/protection: Surgical     Comment: Vasectomy        Family History   Problem Relation Age of Onset   • Diabetes Father    • Prostate cancer Father    • Pancreatic cancer Father           Objective     Vitals:    01/19/21 1315   BP: 117/67   Pulse: 91   Resp: 16   Temp: 97.1 °F (36.2 °C)   Weight: 96.2 kg (212 lb)   Height: 162.6 cm (64.02\")   PainSc: 0-No pain     Current Status 1/19/2021   ECOG score 0       Physical Exam      GENERAL: Alert, awake, oriented.  Well dressed.  Not in apparent distress. Vitals as above.   HEAD: Normocephalic, atraumatic.   EYES: PERRL, EOMI. vision is grossly intact.  Conjunctival pallor +   NECK: Supple, no adenopathy or thyromegaly.   THROAT: Normal oral cavity " and pharynx. No inflammation, swelling, exudate, or lesions.  CARDIAC: Normal S1 and S2. No S3, S4 or murmurs. Rhythm is regular.  Extremities are warm and well perfused.   LUNGS: Clear to auscultation and percussion without rales, rhonchi, wheezing or diminished breath sounds.  ABDOMEN: Positive bowel sounds. Soft, nondistended, nontender. No guarding or rebound. No masses.  BACK:  No bony tenderness.   EXTREMITIES: No significant deformity or joint abnormality.  Peripheral pulses intact. No varicosities.  SKIN: No rash or bruising.  NEUROLOGICAL: Grossly non-focal exam. No focal weakness. Gait: Normal.   PSYCH: Mood and affect normal. No hallucination or suicidal thoughts.   LYMPHATIC: No cervical, supraclavicular or axillary adenopathy.       RECENT LABS:Independently reviewed and summarized  Hematology WBC   Date Value Ref Range Status   01/19/2021 16.18 (H) 3.40 - 10.80 10*3/mm3 Final     RBC   Date Value Ref Range Status   01/19/2021 4.72 3.77 - 5.28 10*6/mm3 Final     Hemoglobin   Date Value Ref Range Status   01/19/2021 11.8 (L) 12.0 - 15.9 g/dL Final     Hematocrit   Date Value Ref Range Status   01/19/2021 36.3 34.0 - 46.6 % Final     Platelets   Date Value Ref Range Status   01/19/2021 492 (H) 140 - 450 10*3/mm3 Final          I have reviewed old records and summarized them in HPI as well as assessment and plan section of this note.     External provider notes from Dr Maldonado (1/11/21) and Dr savage reviewed.     Suzi Heck reports a pain score of 0.  Given her pain assessment as noted, treatment options were discussed and the following options were decided upon as a follow-up plan to address the patient's pain: continuation of current treatment plan for pain.      Patient screened positive for depression based on a PHQ-9 score of 0 on 1/19/2021. Follow-up recommendations include: Suicide Risk Assessment performed.      Diagnosis:   (1) Iron deficiency anemia   (2) Iron malabsorption   (3)  Thrombocytosis  (4) Leukocytosis     All are new diagnosis/problems for me.   In addition, diagnosis of (3) and (4) has uncertain prognosis.       Assessment/Plan     (1) Iron deficiency anemia (2) Iron malabsorption     Patient with microcytosis on routine labs.  I checked her CBC and iron studies today which show iron deficiency anemia.  Her iron deficiency anemia is likely secondary to iron malabsorption from Crohn's disease.  I will also check vitamin B12 and folic acid level to other causes of nutritional anemia.  Patient unable to tolerate oral iron due to Crohn's disease and has iron malabsorption as well.  Due to this reason I believe she could benefit from IV iron treatment.    I had an extensive discussion with patient about diagnosis and treatment options. I recommend that we replace their iron with IV Venofer -200 mg IV, 5 infusion. I also recommend that we should check iron studies every 3 months after this initial replacement and consider IV iron treatment for ferritin drops to less than 50 or transference saturation drops to less than 20%.      I had an extensive discussion with the patient about risk versus benefits of IV iron treatment.    I discussed about various risks associated with IV iron such as allergic reaction, hypersensitivity reaction, headache, flushing, joint aches or pains, local IV infiltration and skin discoloration.  After our discussion the patient was in agreement in  proceeding with IV iron treatment for  anemia.      (3) Thrombocytosis  Patient with incidentally detected thrombocytosis.  No B symptoms.  No palpable lymphadenopathy or hepatosplenomegaly.  No prior arterial venous thrombosis.  Patient also has concomitant iron deficiency which could cause thrombocytosis.  I will plan to treat her iron deficiency and if no improvement after IV iron replacement we will consider myeloproliferative disorder testing.    (4) Leukocytosis   Patient with incidentally detected  neutrophilic and lymphocytic leukocytosis.  No B symptoms.  No palpable lymphadenopathy or hepatosplenomegaly.  I will review peripheral smear and check flow cytometry to rule out underlying hematological disorder.  Discussed with patient that this could be reactive secondary to her Crohn's disease.  However, will check flow cytometry to rule out any hematological disorder.

## 2021-01-19 NOTE — TELEPHONE ENCOUNTER
Caller: GLORIA NEWTON    Relationship to patient: SELF    Best call back number: [phone]    Chief complaint: PT IS ON VACATION THE REST OF THIS WEEK AND NEXT MON AND TUES. WANTS TO SEE IF THE INFUSIONS SCHEDULED FROM NEXT WED-FRI CAN BE MOVED TO THIS WED -FRI    Type of visit: INFUSION    Requested date: 01/20, 01/21, 01/22    If rescheduling, when is the original appointment: 01/27, 01/28, 01/29

## 2021-01-19 NOTE — TELEPHONE ENCOUNTER
----- Message from Jennifer Lind MD sent at 1/19/2021  3:20 PM CST -----  Arrange for 5 doses of IV Venofer.  Her iron level is low.

## 2021-01-20 LAB — REF LAB TEST METHOD: NORMAL

## 2021-01-25 ENCOUNTER — INFUSION (OUTPATIENT)
Dept: ONCOLOGY | Facility: HOSPITAL | Age: 46
End: 2021-01-25

## 2021-01-25 ENCOUNTER — APPOINTMENT (OUTPATIENT)
Dept: ONCOLOGY | Facility: HOSPITAL | Age: 46
End: 2021-01-25

## 2021-01-25 VITALS
DIASTOLIC BLOOD PRESSURE: 86 MMHG | RESPIRATION RATE: 18 BRPM | SYSTOLIC BLOOD PRESSURE: 101 MMHG | TEMPERATURE: 97 F | HEART RATE: 117 BPM

## 2021-01-25 DIAGNOSIS — K90.9 IRON MALABSORPTION: Primary | ICD-10-CM

## 2021-01-25 DIAGNOSIS — D50.8 OTHER IRON DEFICIENCY ANEMIA: ICD-10-CM

## 2021-01-25 PROCEDURE — 96365 THER/PROPH/DIAG IV INF INIT: CPT | Performed by: INTERNAL MEDICINE

## 2021-01-25 PROCEDURE — 25010000002 IRON SUCROSE PER 1 MG: Performed by: INTERNAL MEDICINE

## 2021-01-25 PROCEDURE — 63710000001 DIPHENHYDRAMINE PER 50 MG: Performed by: INTERNAL MEDICINE

## 2021-01-25 RX ORDER — DIPHENHYDRAMINE HCL 25 MG
25 CAPSULE ORAL ONCE
Status: CANCELLED | OUTPATIENT
Start: 2021-01-26

## 2021-01-25 RX ORDER — DIPHENHYDRAMINE HCL 25 MG
25 CAPSULE ORAL ONCE
Status: COMPLETED | OUTPATIENT
Start: 2021-01-25 | End: 2021-01-25

## 2021-01-25 RX ORDER — SODIUM CHLORIDE 9 MG/ML
250 INJECTION, SOLUTION INTRAVENOUS ONCE
Status: CANCELLED | OUTPATIENT
Start: 2021-01-26

## 2021-01-25 RX ORDER — FAMOTIDINE 20 MG/1
20 TABLET, FILM COATED ORAL ONCE
Status: COMPLETED | OUTPATIENT
Start: 2021-01-25 | End: 2021-01-25

## 2021-01-25 RX ORDER — MAGNESIUM 200 MG
1000 TABLET ORAL DAILY
Qty: 90 EACH | Refills: 3 | Status: SHIPPED | OUTPATIENT
Start: 2021-01-25 | End: 2021-02-01 | Stop reason: SDUPTHER

## 2021-01-25 RX ORDER — SODIUM CHLORIDE 9 MG/ML
250 INJECTION, SOLUTION INTRAVENOUS ONCE
Status: COMPLETED | OUTPATIENT
Start: 2021-01-25 | End: 2021-01-25

## 2021-01-25 RX ORDER — FAMOTIDINE 20 MG/1
20 TABLET, FILM COATED ORAL ONCE
Status: CANCELLED | OUTPATIENT
Start: 2021-01-26

## 2021-01-25 RX ADMIN — DIPHENHYDRAMINE HYDROCHLORIDE 25 MG: 25 CAPSULE ORAL at 14:21

## 2021-01-25 RX ADMIN — IRON SUCROSE 200 MG: 20 INJECTION, SOLUTION INTRAVENOUS at 14:52

## 2021-01-25 RX ADMIN — FAMOTIDINE 20 MG: 20 TABLET, FILM COATED ORAL at 14:21

## 2021-01-25 RX ADMIN — SODIUM CHLORIDE 250 ML: 9 INJECTION, SOLUTION INTRAVENOUS at 14:21

## 2021-01-26 ENCOUNTER — APPOINTMENT (OUTPATIENT)
Dept: ONCOLOGY | Facility: HOSPITAL | Age: 46
End: 2021-01-26

## 2021-01-26 ENCOUNTER — APPOINTMENT (OUTPATIENT)
Dept: ONCOLOGY | Facility: CLINIC | Age: 46
End: 2021-01-26

## 2021-01-26 ENCOUNTER — INFUSION (OUTPATIENT)
Dept: ONCOLOGY | Facility: HOSPITAL | Age: 46
End: 2021-01-26

## 2021-01-26 VITALS
DIASTOLIC BLOOD PRESSURE: 58 MMHG | TEMPERATURE: 97.9 F | HEART RATE: 79 BPM | SYSTOLIC BLOOD PRESSURE: 111 MMHG | RESPIRATION RATE: 18 BRPM

## 2021-01-26 DIAGNOSIS — K90.9 IRON MALABSORPTION: Primary | ICD-10-CM

## 2021-01-26 DIAGNOSIS — D50.8 OTHER IRON DEFICIENCY ANEMIA: ICD-10-CM

## 2021-01-26 PROCEDURE — 96365 THER/PROPH/DIAG IV INF INIT: CPT | Performed by: INTERNAL MEDICINE

## 2021-01-26 PROCEDURE — 25010000002 IRON SUCROSE PER 1 MG: Performed by: INTERNAL MEDICINE

## 2021-01-26 PROCEDURE — 63710000001 DIPHENHYDRAMINE PER 50 MG: Performed by: INTERNAL MEDICINE

## 2021-01-26 RX ORDER — SODIUM CHLORIDE 9 MG/ML
250 INJECTION, SOLUTION INTRAVENOUS ONCE
Status: COMPLETED | OUTPATIENT
Start: 2021-01-26 | End: 2021-01-26

## 2021-01-26 RX ORDER — SODIUM CHLORIDE 9 MG/ML
250 INJECTION, SOLUTION INTRAVENOUS ONCE
Status: CANCELLED | OUTPATIENT
Start: 2021-01-27

## 2021-01-26 RX ORDER — DIPHENHYDRAMINE HCL 25 MG
25 CAPSULE ORAL ONCE
Status: COMPLETED | OUTPATIENT
Start: 2021-01-26 | End: 2021-01-26

## 2021-01-26 RX ORDER — FAMOTIDINE 20 MG/1
20 TABLET, FILM COATED ORAL ONCE
Status: CANCELLED | OUTPATIENT
Start: 2021-01-27

## 2021-01-26 RX ORDER — DIPHENHYDRAMINE HCL 25 MG
25 CAPSULE ORAL ONCE
Status: CANCELLED | OUTPATIENT
Start: 2021-01-27

## 2021-01-26 RX ORDER — FAMOTIDINE 20 MG/1
20 TABLET, FILM COATED ORAL ONCE
Status: COMPLETED | OUTPATIENT
Start: 2021-01-26 | End: 2021-01-26

## 2021-01-26 RX ADMIN — SODIUM CHLORIDE 250 ML: 9 INJECTION, SOLUTION INTRAVENOUS at 08:26

## 2021-01-26 RX ADMIN — DIPHENHYDRAMINE HYDROCHLORIDE 25 MG: 25 CAPSULE ORAL at 08:25

## 2021-01-26 RX ADMIN — IRON SUCROSE 200 MG: 20 INJECTION, SOLUTION INTRAVENOUS at 08:55

## 2021-01-26 RX ADMIN — FAMOTIDINE 20 MG: 20 TABLET, FILM COATED ORAL at 08:25

## 2021-01-27 ENCOUNTER — INFUSION (OUTPATIENT)
Dept: ONCOLOGY | Facility: HOSPITAL | Age: 46
End: 2021-01-27

## 2021-01-27 VITALS
SYSTOLIC BLOOD PRESSURE: 127 MMHG | TEMPERATURE: 97.8 F | RESPIRATION RATE: 18 BRPM | DIASTOLIC BLOOD PRESSURE: 56 MMHG | HEART RATE: 99 BPM

## 2021-01-27 DIAGNOSIS — K90.9 IRON MALABSORPTION: Primary | ICD-10-CM

## 2021-01-27 DIAGNOSIS — D50.8 OTHER IRON DEFICIENCY ANEMIA: ICD-10-CM

## 2021-01-27 PROCEDURE — 25010000002 IRON SUCROSE PER 1 MG: Performed by: INTERNAL MEDICINE

## 2021-01-27 PROCEDURE — 63710000001 DIPHENHYDRAMINE PER 50 MG: Performed by: INTERNAL MEDICINE

## 2021-01-27 PROCEDURE — 96365 THER/PROPH/DIAG IV INF INIT: CPT | Performed by: INTERNAL MEDICINE

## 2021-01-27 RX ORDER — SODIUM CHLORIDE 9 MG/ML
250 INJECTION, SOLUTION INTRAVENOUS ONCE
Status: CANCELLED | OUTPATIENT
Start: 2021-01-28

## 2021-01-27 RX ORDER — FAMOTIDINE 20 MG/1
20 TABLET, FILM COATED ORAL ONCE
Status: CANCELLED | OUTPATIENT
Start: 2021-01-28

## 2021-01-27 RX ORDER — FAMOTIDINE 20 MG/1
20 TABLET, FILM COATED ORAL ONCE
Status: COMPLETED | OUTPATIENT
Start: 2021-01-27 | End: 2021-01-27

## 2021-01-27 RX ORDER — DIPHENHYDRAMINE HCL 25 MG
25 CAPSULE ORAL ONCE
Status: COMPLETED | OUTPATIENT
Start: 2021-01-27 | End: 2021-01-27

## 2021-01-27 RX ORDER — DIPHENHYDRAMINE HCL 25 MG
25 CAPSULE ORAL ONCE
Status: CANCELLED | OUTPATIENT
Start: 2021-01-28

## 2021-01-27 RX ORDER — SODIUM CHLORIDE 9 MG/ML
250 INJECTION, SOLUTION INTRAVENOUS ONCE
Status: COMPLETED | OUTPATIENT
Start: 2021-01-27 | End: 2021-01-27

## 2021-01-27 RX ADMIN — SODIUM CHLORIDE 250 ML: 9 INJECTION, SOLUTION INTRAVENOUS at 14:19

## 2021-01-27 RX ADMIN — FAMOTIDINE 20 MG: 20 TABLET, FILM COATED ORAL at 14:15

## 2021-01-27 RX ADMIN — IRON SUCROSE 200 MG: 20 INJECTION, SOLUTION INTRAVENOUS at 14:52

## 2021-01-27 RX ADMIN — DIPHENHYDRAMINE HYDROCHLORIDE 25 MG: 25 CAPSULE ORAL at 14:15

## 2021-01-28 ENCOUNTER — INFUSION (OUTPATIENT)
Dept: ONCOLOGY | Facility: HOSPITAL | Age: 46
End: 2021-01-28

## 2021-01-28 VITALS
SYSTOLIC BLOOD PRESSURE: 120 MMHG | TEMPERATURE: 97.8 F | HEART RATE: 102 BPM | DIASTOLIC BLOOD PRESSURE: 69 MMHG | RESPIRATION RATE: 18 BRPM

## 2021-01-28 DIAGNOSIS — K90.9 IRON MALABSORPTION: Primary | ICD-10-CM

## 2021-01-28 DIAGNOSIS — D50.8 OTHER IRON DEFICIENCY ANEMIA: ICD-10-CM

## 2021-01-28 PROCEDURE — 25010000002 IRON SUCROSE PER 1 MG: Performed by: INTERNAL MEDICINE

## 2021-01-28 PROCEDURE — 96365 THER/PROPH/DIAG IV INF INIT: CPT | Performed by: INTERNAL MEDICINE

## 2021-01-28 PROCEDURE — 63710000001 DIPHENHYDRAMINE PER 50 MG: Performed by: INTERNAL MEDICINE

## 2021-01-28 RX ORDER — SODIUM CHLORIDE 9 MG/ML
250 INJECTION, SOLUTION INTRAVENOUS ONCE
Status: CANCELLED | OUTPATIENT
Start: 2021-01-29

## 2021-01-28 RX ORDER — SODIUM CHLORIDE 9 MG/ML
250 INJECTION, SOLUTION INTRAVENOUS ONCE
Status: COMPLETED | OUTPATIENT
Start: 2021-01-28 | End: 2021-01-28

## 2021-01-28 RX ORDER — FAMOTIDINE 20 MG/1
20 TABLET, FILM COATED ORAL ONCE
Status: CANCELLED | OUTPATIENT
Start: 2021-01-29

## 2021-01-28 RX ORDER — FAMOTIDINE 20 MG/1
20 TABLET, FILM COATED ORAL ONCE
Status: COMPLETED | OUTPATIENT
Start: 2021-01-28 | End: 2021-01-28

## 2021-01-28 RX ORDER — DIPHENHYDRAMINE HCL 25 MG
25 CAPSULE ORAL ONCE
Status: COMPLETED | OUTPATIENT
Start: 2021-01-28 | End: 2021-01-28

## 2021-01-28 RX ORDER — DIPHENHYDRAMINE HCL 25 MG
25 CAPSULE ORAL ONCE
Status: CANCELLED | OUTPATIENT
Start: 2021-01-29

## 2021-01-28 RX ADMIN — SODIUM CHLORIDE 250 ML: 9 INJECTION, SOLUTION INTRAVENOUS at 13:57

## 2021-01-28 RX ADMIN — DIPHENHYDRAMINE HYDROCHLORIDE 25 MG: 25 CAPSULE ORAL at 13:57

## 2021-01-28 RX ADMIN — IRON SUCROSE 200 MG: 20 INJECTION, SOLUTION INTRAVENOUS at 14:27

## 2021-01-28 RX ADMIN — FAMOTIDINE 20 MG: 20 TABLET, FILM COATED ORAL at 13:57

## 2021-01-29 ENCOUNTER — APPOINTMENT (OUTPATIENT)
Dept: ONCOLOGY | Facility: HOSPITAL | Age: 46
End: 2021-01-29

## 2021-01-29 ENCOUNTER — INFUSION (OUTPATIENT)
Dept: ONCOLOGY | Facility: HOSPITAL | Age: 46
End: 2021-01-29

## 2021-01-29 VITALS
DIASTOLIC BLOOD PRESSURE: 72 MMHG | TEMPERATURE: 97.1 F | SYSTOLIC BLOOD PRESSURE: 121 MMHG | HEART RATE: 88 BPM | RESPIRATION RATE: 16 BRPM

## 2021-01-29 DIAGNOSIS — D50.8 OTHER IRON DEFICIENCY ANEMIA: ICD-10-CM

## 2021-01-29 DIAGNOSIS — K90.9 IRON MALABSORPTION: Primary | ICD-10-CM

## 2021-01-29 PROCEDURE — 63710000001 DIPHENHYDRAMINE PER 50 MG: Performed by: INTERNAL MEDICINE

## 2021-01-29 PROCEDURE — 96365 THER/PROPH/DIAG IV INF INIT: CPT | Performed by: INTERNAL MEDICINE

## 2021-01-29 PROCEDURE — 25010000002 IRON SUCROSE PER 1 MG: Performed by: INTERNAL MEDICINE

## 2021-01-29 RX ORDER — DIPHENHYDRAMINE HCL 25 MG
25 CAPSULE ORAL ONCE
Status: CANCELLED | OUTPATIENT
Start: 2021-01-29

## 2021-01-29 RX ORDER — FAMOTIDINE 20 MG/1
20 TABLET, FILM COATED ORAL ONCE
Status: COMPLETED | OUTPATIENT
Start: 2021-01-29 | End: 2021-01-29

## 2021-01-29 RX ORDER — SODIUM CHLORIDE 9 MG/ML
250 INJECTION, SOLUTION INTRAVENOUS ONCE
Status: COMPLETED | OUTPATIENT
Start: 2021-01-29 | End: 2021-01-29

## 2021-01-29 RX ORDER — DIPHENHYDRAMINE HCL 25 MG
25 CAPSULE ORAL ONCE
Status: COMPLETED | OUTPATIENT
Start: 2021-01-29 | End: 2021-01-29

## 2021-01-29 RX ORDER — SODIUM CHLORIDE 9 MG/ML
250 INJECTION, SOLUTION INTRAVENOUS ONCE
Status: CANCELLED | OUTPATIENT
Start: 2021-01-29

## 2021-01-29 RX ORDER — FAMOTIDINE 20 MG/1
20 TABLET, FILM COATED ORAL ONCE
Status: CANCELLED | OUTPATIENT
Start: 2021-01-29

## 2021-01-29 RX ADMIN — FAMOTIDINE 20 MG: 20 TABLET, FILM COATED ORAL at 13:17

## 2021-01-29 RX ADMIN — SODIUM CHLORIDE 250 ML: 9 INJECTION, SOLUTION INTRAVENOUS at 13:17

## 2021-01-29 RX ADMIN — DIPHENHYDRAMINE HYDROCHLORIDE 25 MG: 25 CAPSULE ORAL at 13:17

## 2021-01-29 RX ADMIN — IRON SUCROSE 200 MG: 20 INJECTION, SOLUTION INTRAVENOUS at 13:48

## 2021-02-01 RX ORDER — MAGNESIUM 200 MG
1000 TABLET ORAL DAILY
Qty: 90 EACH | Refills: 3 | Status: SHIPPED | OUTPATIENT
Start: 2021-02-01 | End: 2023-03-20

## 2021-05-19 ENCOUNTER — APPOINTMENT (OUTPATIENT)
Dept: ONCOLOGY | Facility: CLINIC | Age: 46
End: 2021-05-19

## 2021-05-19 ENCOUNTER — APPOINTMENT (OUTPATIENT)
Dept: ONCOLOGY | Facility: HOSPITAL | Age: 46
End: 2021-05-19

## 2021-05-20 ENCOUNTER — APPOINTMENT (OUTPATIENT)
Dept: ONCOLOGY | Facility: HOSPITAL | Age: 46
End: 2021-05-20

## 2023-03-20 ENCOUNTER — OFFICE VISIT (OUTPATIENT)
Dept: OBSTETRICS AND GYNECOLOGY | Facility: CLINIC | Age: 48
End: 2023-03-20
Payer: COMMERCIAL

## 2023-03-20 VITALS
DIASTOLIC BLOOD PRESSURE: 74 MMHG | BODY MASS INDEX: 34.31 KG/M2 | SYSTOLIC BLOOD PRESSURE: 120 MMHG | WEIGHT: 201 LBS | HEIGHT: 64 IN

## 2023-03-20 DIAGNOSIS — N93.9 ABNORMAL UTERINE BLEEDING (AUB): ICD-10-CM

## 2023-03-20 DIAGNOSIS — N95.1 VASOMOTOR SYMPTOMS DUE TO MENOPAUSE: Primary | ICD-10-CM

## 2023-03-20 DIAGNOSIS — N92.6 IRREGULAR PERIODS: ICD-10-CM

## 2023-03-20 PROCEDURE — 99203 OFFICE O/P NEW LOW 30 MIN: CPT

## 2023-03-20 NOTE — PROGRESS NOTES
Subjective   Suzi Heck is a 48 y.o. Cycle issues     History of Present Illness  LMP: 3/8/23  Pap: 5/31/18, nl, neg hpv  Mammo: 12/20/19, Bi-rads 1  BC: Vasectomy s/o    Patient presents today for irregular cycles. They have been abnormal for the last 6-7 months. With the last 3 months being the worst. She reports continuous bleeding with only a few days off in between. She has a history of Crohn's disease. Her periods have always caused her Crohn's to flare, and the irregular periods have made it worse. She believes she is going through the change. Reports her mother was in her early 40's when she went through menopause. Reports gaining weight, garcia and hot flashes. Reports she has anemia and during her periods she has extreme, fatigue. She would like to discuss having an ablation done to help stop the bleeding.       Menstrual Problem  This is a new problem. The current episode started more than 1 month ago. The problem occurs intermittently. The problem has been waxing and waning. Associated symptoms include abdominal pain and fatigue. Pertinent negatives include no chest pain, chills, coughing, fever, headaches, nausea, urinary symptoms or vomiting.       The following portions of the patient's history were reviewed and updated as appropriate: allergies, current medications, past family history, past medical history, past social history, past surgical history and problem list.    Review of Systems   Constitutional: Positive for fatigue. Negative for appetite change, chills and fever.   Respiratory: Negative for apnea, cough, choking, chest tightness, shortness of breath, wheezing and stridor.    Cardiovascular: Negative for chest pain, palpitations and leg swelling.   Gastrointestinal: Positive for abdominal pain. Negative for constipation, diarrhea, nausea and vomiting.   Endocrine:        Hot flashes    Genitourinary: Positive for menstrual problem. Negative for amenorrhea, breast discharge, breast  lump, breast pain, decreased libido, decreased urine volume, difficulty urinating, dyspareunia, dysuria, flank pain, frequency, genital sores, hematuria, pelvic pain, pelvic pressure, urgency, urinary incontinence, vaginal bleeding, vaginal discharge and vaginal pain.   Psychiatric/Behavioral:        Reid        Objective   Physical Exam  Vitals reviewed.   Constitutional:       General: She is awake. She is not in acute distress.     Appearance: Normal appearance. She is well-developed and normal weight. She is not ill-appearing, toxic-appearing or diaphoretic.   Cardiovascular:      Rate and Rhythm: Normal rate and regular rhythm.      Pulses: Normal pulses.      Heart sounds: Normal heart sounds.   Pulmonary:      Effort: Pulmonary effort is normal.      Breath sounds: Normal breath sounds.   Abdominal:      General: Bowel sounds are normal.   Skin:     General: Skin is warm and dry.   Neurological:      Mental Status: She is alert and easily aroused.   Psychiatric:         Behavior: Behavior is cooperative.           Assessment & Plan   Diagnoses and all orders for this visit:    1. Vasomotor symptoms due to menopause (Primary)    2. Irregular periods    3. Abnormal uterine bleeding (AUB)  -     US Non-ob Transvaginal; Future      Discussed menopause and vasomotor symptoms.   Offered SSRI to help control vasomotor symptoms, pt declined at this time. Stating medication can make her Crohn's flare.   Offered BC to help control bleeding. Pt declined. States BC causes her to become a different person, that's why her  got a vasectomy.   Pt would like to proceed with an ablation. Discussed work up for ablation consult consisting of TVUS, pap updated and EMBx. Patient is agreeable to this plan of care.   RTC for TVUS, then pap update, EMBx.       This document has been electronically signed by PEDRO Wolfe on March 20, 2023 16:06 CDT

## 2023-03-27 ENCOUNTER — PATIENT ROUNDING (BHMG ONLY) (OUTPATIENT)
Dept: OBSTETRICS AND GYNECOLOGY | Facility: CLINIC | Age: 48
End: 2023-03-27
Payer: COMMERCIAL

## 2023-03-27 NOTE — PROGRESS NOTES
March 27, 2023    Hello, may I speak with Suzi Ji Umang?    My name is Tamika Overton CSA    [facility] [address]  [facility] [address] [phone].    Before we get started may I verify your date of birth? 1975    I am calling to officially welcome you to our practice and ask about your recent visit. Is this a good time to talk? Voicemail left    Tell me about your visit with us. What things went well?         We're always looking for ways to make our patients' experiences even better. Do you have recommendations on ways we may improve?      Overall were you satisfied with your first visit to our practice?        I appreciate you taking the time to speak with me today. Is there anything else I can do for you?       Thank you, and have a great day.

## 2023-04-06 ENCOUNTER — OFFICE VISIT (OUTPATIENT)
Dept: OBSTETRICS AND GYNECOLOGY | Facility: CLINIC | Age: 48
End: 2023-04-06
Payer: COMMERCIAL

## 2023-04-06 VITALS
BODY MASS INDEX: 34.15 KG/M2 | WEIGHT: 200 LBS | HEIGHT: 64 IN | SYSTOLIC BLOOD PRESSURE: 116 MMHG | DIASTOLIC BLOOD PRESSURE: 72 MMHG

## 2023-04-06 DIAGNOSIS — Z01.419 ENCOUNTER FOR WELL WOMAN EXAM WITH ROUTINE GYNECOLOGICAL EXAM: Primary | ICD-10-CM

## 2023-04-06 DIAGNOSIS — N93.9 ABNORMAL UTERINE BLEEDING (AUB): ICD-10-CM

## 2023-04-06 DIAGNOSIS — Z12.31 ENCOUNTER FOR SCREENING MAMMOGRAM FOR MALIGNANT NEOPLASM OF BREAST: ICD-10-CM

## 2023-04-06 LAB
B-HCG UR QL: NEGATIVE
EXPIRATION DATE: NORMAL
INTERNAL NEGATIVE CONTROL: NEGATIVE
INTERNAL POSITIVE CONTROL: POSITIVE
Lab: NORMAL

## 2023-04-06 NOTE — PROGRESS NOTES
Subjective   Suzi Heck is a 48 y.o. Pap and EMBx    History of Present Illness  LMP: 3/31/23  Pap: 5/31/18, nl, neg hpv  Mammo: 12/20/19, Bi-rads 1  BC: Vasectomy     Patient was recently seen for irregular heavy periods. They have been abnormal for 6-7 months. She reports continuous bleeding. The increase in irregular and long periods makes her Crohn's worse. She is over the bleeding. Pt verbalized that she would like to have an ablation.     Results reviewed with patient:  Prelim US: Uterus: 10.28x5.28x6.29cm, 178.86ml. Heterogenous echotexture. Endometrium: 1.17cm. Right ovary: 3.63x2.59x2.7cm, 13.27ml. 2.09x2.11x1.51cm cyst. Left ovary: 3.84x3.49x2.64cm, 18.53ml. Minimal free fluid in the cul-de-sac.        Gynecologic Exam  The patient's pertinent negatives include no genital itching, genital lesions, genital odor, genital rash, missed menses, pelvic pain, vaginal bleeding or vaginal discharge. Pertinent negatives include no abdominal pain, chills, constipation, diarrhea, dysuria, fever, flank pain, frequency, headaches, hematuria, nausea, urgency or vomiting. Her menstrual history has been irregular.       The following portions of the patient's history were reviewed and updated as appropriate: allergies, current medications, past family history, past medical history, past social history, past surgical history and problem list.    Review of Systems   Constitutional: Negative for appetite change, chills, fatigue and fever.   Respiratory: Negative for apnea, cough, choking, chest tightness, shortness of breath, wheezing and stridor.    Cardiovascular: Negative for chest pain, palpitations and leg swelling.   Gastrointestinal: Negative for abdominal pain, constipation, diarrhea, nausea and vomiting.   Genitourinary: Positive for menstrual problem. Negative for amenorrhea, breast discharge, breast lump, breast pain, decreased libido, decreased urine volume, difficulty urinating, dyspareunia, dysuria,  flank pain, frequency, genital sores, hematuria, missed menses, pelvic pain, pelvic pressure, urgency, urinary incontinence, vaginal bleeding, vaginal discharge and vaginal pain.       Objective   Physical Exam  Vitals reviewed. Exam conducted with a chaperone present.   Constitutional:       General: She is awake. She is not in acute distress.     Appearance: Normal appearance. She is well-developed and normal weight. She is not ill-appearing, toxic-appearing or diaphoretic.   Cardiovascular:      Rate and Rhythm: Normal rate and regular rhythm.      Pulses: Normal pulses.      Heart sounds: Normal heart sounds.   Pulmonary:      Effort: Pulmonary effort is normal.      Breath sounds: Normal breath sounds.   Chest:   Breasts:     Jose Score is 5.      Breasts are symmetrical.      Right: Normal. No swelling, bleeding, inverted nipple, mass, nipple discharge, skin change or tenderness.      Left: Normal. No swelling, bleeding, inverted nipple, mass, nipple discharge, skin change or tenderness.   Abdominal:      General: Bowel sounds are normal.      Hernia: There is no hernia in the left inguinal area or right inguinal area.   Genitourinary:     General: Normal vulva.      Exam position: Lithotomy position.      Pubic Area: No rash or pubic lice.       Jose stage (genital): 5.      Labia:         Right: No rash, tenderness, lesion or injury.         Left: No rash, tenderness, lesion or injury.       Vagina: Normal.      Cervix: Normal.      Uterus: Normal.       Adnexa: Right adnexa normal and left adnexa normal.      Comments: Pap collected  Embx collected   Lymphadenopathy:      Lower Body: No right inguinal adenopathy. No left inguinal adenopathy.   Skin:     General: Skin is warm and dry.   Neurological:      Mental Status: She is alert and easily aroused.   Psychiatric:         Behavior: Behavior is cooperative.           Assessment & Plan   Diagnoses and all orders for this visit:    1. Encounter for well  woman exam with routine gynecological exam (Primary)  -     LIQUID-BASED PAP SMEAR, [facility] (ALIX,[facility]); Future  -     LIQUID-BASED PAP SMEAR, [facility] (ALIX,COR,[facility])    2. Abnormal uterine bleeding (AUB)  -     TISSUE EXAM, [facility] (AL[facility]); Future  -     TISSUE EXAM, [facility] (ALIX,COR,[facility])  -     POC Pregnancy, Urine    3. Encounter for screening mammogram for malignant neoplasm of breast  -     Mammo screening digital tomosynthesis bilateral w CAD; Future      Reviewed preventative screening recommendations. Patient educated and encouraged to do monthly self breast exams. Mammogram order placed, pt to schedule. If pap smear is normal patient will receive a letter in the mail in about two weeks. If pap smear is abnormal we will call the patient and follow up with a plan. If pap smear is normal recommend to repeat pap in 5 years.    EMBx collected today.   Patient would like to proceed with an ablation consult with an MD. Will have pt schedule an appointment.         This document has been electronically signed by PEDRO Wolfe on April 6, 2023 13:44 CDT

## 2023-04-06 NOTE — PROGRESS NOTES
Endometrial Biopsy    Date of procedure:  4/6/2023  R&B discussed? Yes  Preg test? Negative  Consent signed? Yes    Procedure documentation:    The cervix was cleansed with betadine. The cervix was grasped anterior at the 1 o'clock and 11 o'clock position.  The cavity sounded to 10 centimeters.  An endometrial biopsy specimen was obtained. The tissue was sent for permanent histopathologic evaluation.  Vanessa was removed from the cervix with scant bleeding. She tolerated the procedure well.    Post procedure instructions: She was instructed to call us in 1 week's time if she has not heard from us otherwise.  If there is any significant fever or excessive bleeding or pain she is to call immediately.          This document has been electronically signed by PEDRO Wolfe on April 6, 2023 13:48 CDT      PEDRO Wolfe  April 6, 2023

## 2023-04-07 LAB
REF LAB TEST METHOD: NORMAL
REF LAB TEST METHOD: NORMAL

## 2023-04-17 ENCOUNTER — OFFICE VISIT (OUTPATIENT)
Dept: OBSTETRICS AND GYNECOLOGY | Facility: CLINIC | Age: 48
End: 2023-04-17
Payer: COMMERCIAL

## 2023-04-17 ENCOUNTER — LAB (OUTPATIENT)
Dept: LAB | Facility: HOSPITAL | Age: 48
End: 2023-04-17
Payer: COMMERCIAL

## 2023-04-17 VITALS
SYSTOLIC BLOOD PRESSURE: 112 MMHG | HEIGHT: 64 IN | BODY MASS INDEX: 34.04 KG/M2 | DIASTOLIC BLOOD PRESSURE: 68 MMHG | WEIGHT: 199.4 LBS

## 2023-04-17 DIAGNOSIS — R10.31 RIGHT LOWER QUADRANT ABDOMINAL PAIN: ICD-10-CM

## 2023-04-17 DIAGNOSIS — N92.1 MENORRHAGIA WITH IRREGULAR CYCLE: Primary | ICD-10-CM

## 2023-04-17 DIAGNOSIS — N93.9 ABNORMAL UTERINE BLEEDING (AUB): Primary | ICD-10-CM

## 2023-04-17 DIAGNOSIS — N93.9 ABNORMAL UTERINE BLEEDING (AUB): ICD-10-CM

## 2023-04-17 LAB
ESTRADIOL SERPL HS-MCNC: 28.1 PG/ML
FSH SERPL-ACNC: 5.19 MIU/ML
HBA1C MFR BLD: 5.7 % (ref 4.8–5.6)
LH SERPL-ACNC: 2.06 MIU/ML
TSH SERPL DL<=0.05 MIU/L-ACNC: 2.41 UIU/ML (ref 0.27–4.2)

## 2023-04-17 PROCEDURE — 82670 ASSAY OF TOTAL ESTRADIOL: CPT

## 2023-04-17 PROCEDURE — 83002 ASSAY OF GONADOTROPIN (LH): CPT

## 2023-04-17 PROCEDURE — 36415 COLL VENOUS BLD VENIPUNCTURE: CPT

## 2023-04-17 PROCEDURE — 83036 HEMOGLOBIN GLYCOSYLATED A1C: CPT

## 2023-04-17 PROCEDURE — 83001 ASSAY OF GONADOTROPIN (FSH): CPT

## 2023-04-17 PROCEDURE — 84443 ASSAY THYROID STIM HORMONE: CPT

## 2023-04-17 RX ORDER — SODIUM CHLORIDE, SODIUM LACTATE, POTASSIUM CHLORIDE, CALCIUM CHLORIDE 600; 310; 30; 20 MG/100ML; MG/100ML; MG/100ML; MG/100ML
125 INJECTION, SOLUTION INTRAVENOUS CONTINUOUS
Status: CANCELLED | OUTPATIENT
Start: 2023-05-08

## 2023-04-17 RX ORDER — SODIUM CHLORIDE 9 MG/ML
40 INJECTION, SOLUTION INTRAVENOUS AS NEEDED
Status: CANCELLED | OUTPATIENT
Start: 2023-05-08

## 2023-04-17 RX ORDER — CETIRIZINE HYDROCHLORIDE 10 MG/1
10 TABLET ORAL DAILY
COMMUNITY

## 2023-04-17 RX ORDER — SODIUM CHLORIDE 0.9 % (FLUSH) 0.9 %
3 SYRINGE (ML) INJECTION EVERY 12 HOURS SCHEDULED
Status: CANCELLED | OUTPATIENT
Start: 2023-05-08

## 2023-04-17 RX ORDER — SODIUM CHLORIDE 0.9 % (FLUSH) 0.9 %
10 SYRINGE (ML) INJECTION AS NEEDED
Status: CANCELLED | OUTPATIENT
Start: 2023-05-08

## 2023-04-17 RX ORDER — BUPIVACAINE HCL/0.9 % NACL/PF 0.1 %
2 PLASTIC BAG, INJECTION (ML) EPIDURAL ONCE
Status: CANCELLED | OUTPATIENT
Start: 2023-05-08 | End: 2023-04-17

## 2023-04-17 NOTE — PROGRESS NOTES
[facility]  Gynecology  Date of Service: 2023    CC:consult for ablation    HPI  Suzi Heck is a 48 y.o.  pre vs. perimenopausal female who presents with complaints of irregular heavy menses desiring endometrial ablation.      23 Ut 10.3 x 5.3 x 6.3 cm, heterogeneous echotexture, ES 1.17 cm, ROV 3.6 x 2.6 x 2.7 cm with 2.1 x 2.1 x 1.5 cm cyst; LOV 3.8 x 3.5 x 2.6 cm    Irregular heavy periods x 6-7 mos, continueous bleeding, worsens Crohn's. States only about 20 days no bleeding this year. Usually heavy times x 10 days or so, lighter, then may have few days off before starts again. Pain, cramping, diarrhea with bleeding. Recently dealing with some constipation.     23 NIL/hrHPV neg  23 EMB benign; Proliferative phase endometrium   Negative for hyperplasia or malignancy     Having some hot flashes and night sweats, multiple times per week. Mood changes. States 20# weight gain 8 mos. Perimenopause symptoms around 40's.     Sharp RLQ pain. Colonoscopy 3-4 years ago.    Denies any vaginal itching, burning, irritation, or discharge. Not reporting any sexual dysfunction concerns.  Not reporting any urinary symptoms including incontinence, dysuria, frequency, urgency, nocturia.    ROS  Review of Systems   Constitutional: Negative.    HENT: Negative.    Respiratory: Negative.    Cardiovascular: Negative.    Gastrointestinal: Negative.    Genitourinary: Positive for menstrual problem.   Skin: Negative.    Psychiatric/Behavioral: Negative.        GYN HISTORY  Menses: see above; when younger sometimes would miss cycles, up to 6 mos without  History of STIs: denies  Last pap smear:   Last Completed Pap Smear          PAP SMEAR (Every 3 Years) Next due on 2023  LIQUID-BASED PAP SMEAR, [facility] ([facility])    2018  Liquid-based Pap Smear, Screening    2018  HPV DNA Probe, Direct - ThinPrep Vial, Vagina    2012  Converted (Historical) Gyn  Cytology    10/15/2007  Converted (Historical) Gyn Cytology              Abnormal pap smear history: denies  Contraception: vasectomy  OCPs-mood changes     OB HISTORY  OB History    Para Term  AB Living   1 1 1     1   SAB IAB Ectopic Molar Multiple Live Births             1      # Outcome Date GA Lbr Jovan/2nd Weight Sex Delivery Anes PTL Lv   1 Term 07    M CS-LTranv EPI N ARLETTE      Complications: Failure to Progress in First Stage, Preeclampsia     PAST MEDICAL HISTORY  Past Medical History:   Diagnosis Date   • Abdominal pain     right lower quadrant      • Absolute anemia 2021   • Allergy     seasonal   • Conjunctivitis    • Encounter for routine gynecological examination    • Gastroesophageal reflux disease    • Irritable bowel syndrome with diarrhea    • Pigmented skin lesion    • PONV (postoperative nausea and vomiting)    • Screening examination for venereal disease    • Viral gastroenteritis    Crohn's    PAST SURGICAL HISTORY  Past Surgical History:   Procedure Laterality Date   •  SECTION  2007    (1) - Primary low transverse C section   • CHOLECYSTECTOMY     • COLONOSCOPY N/A 2019    Procedure: COLONOSCOPY;  Surgeon: Myles Ferraro MD;  Location: [facility] ENDOSCOPY;  Service: Gastroenterology   • ENDOSCOPY N/A 2019    Procedure: ESOPHAGOGASTRODUODENOSCOPY;  Surgeon: Myles Ferraro MD;  Location: [facility] ENDOSCOPY;  Service: Gastroenterology   • INJECTION OF MEDICATION  2015    Zofran (1) - TRINA Whittington   • OTHER SURGICAL HISTORY       Foot/toes surgery procedure (1)  -  x 2   • OTHER SURGICAL HISTORY  2007    EXAM OF CERVIX W/SCOPE 62259 (1) - mild dysplasia(JOANIE I) Squamoglandular mucosa. Chronic cervicitis. Curettings, Endocervix: unremarkable columnar cell epithelium   • OTHER SURGICAL HISTORY  2016    Biopsy, Each Additional Lesion 62402 (1) - LONNIE Maldonado   • OTHER SURGICAL HISTORY  2010    Remove Impacted Cerumen 80019 (1) Ralph NICHOLE  "Uma   • PAP SMEAR  10/15/2007    (1) - Epithelial cell abnormality: squamous cells, Atypical squamouscells of undetermined significance. cannot exclude HSIL/(ASC-H)    • SKIN BIOPSY  06/16/2016    Biopsy of Skin 69135 (1) - LONNIE Maldonado   • THUMB CARPOMETACARPAL JOINT ARTHROPLASTY FLEXOR CARPI RADIALIS TENDON Right 4/13/2017    Procedure: THUMB CARPOMETACARPAL JOINT ARTHROPLASTY FLEXOR CARPI RADIALIS TENDON RELEASE  EXCISION OF GANGLLION AND RELEASE OF A1 PULLEY WRIST EXTENSOR DECREASED VEINS DISEASE;  Surgeon: Kolton Emery MD;  Location[facility];  [place]      [facility] [place]    FAMILY HISTORY  Family History   Problem Relation Age of Onset   • Diabetes Father    • Prostate cancer Father    • Pancreatic cancer Father    No br/ut/ov/colon ca    SOCIAL HISTORY  Social History     Socioeconomic History   • Marital status:    Tobacco Use   • Smoking status: Every Day     Packs/day: 1.00     Years: 25.00     Pack years: 25.00     Types: Cigarettes     Start date: 1/17/1990   • Smokeless tobacco: Never   • Tobacco comments:     Current Smoker -    Vaping Use   • Vaping Use: Never used   Substance and Sexual Activity   • Alcohol use: No   • Drug use: No   • Sexual activity: Yes     Partners: Male     Birth control/protection: Surgical     Comment: Vasectomy     ALLERGIES  Allergies   Allergen Reactions   • Adhesive Tape Other (See Comments)     blisters   • Codeine      UNKNOWN REACTION   • Demerol [Meperidine] Mental Status Change   • Vicodin [Hydrocodone-Acetaminophen] Other (See Comments)     \"non functional\"     HOME MEDICATIONS  Prior to Admission medications    Medication Sig Start Date End Date Taking? Authorizing Provider   cetirizine (zyrTEC) 5 MG tablet Take 1 tablet by mouth Daily.   Yes ProviderJohn MD   Multiple Vitamins-Minerals (MULTIVIT/MULTIMINERAL ADULT PO) Take  by mouth.   Yes ProviderJohn MD   omeprazole (priLOSEC) 40 MG capsule Take 1 capsule by mouth Daily. 1/11/21  Yes Joel, " "Nicholas SHOOK MD   Probiotic Product (PROBIOTIC ADVANCED PO) Take  by mouth.   Yes Provider, MD John     PE  /68   Ht 162.6 cm (64\")   Wt 90.4 kg (199 lb 6.4 oz)   LMP 2023   BMI 34.23 kg/m²        General: Alert, healthy, no distress, well nourished and well developed.  Neurologic: Alert, oriented to person, place, and time.  Gait normal.  Cranial nerves II-XII grossly intact.  HEENT: Normocephalic, atraumatic.  Extraocular muscles intact.  Lungs: Normal respiratory effort.   Abdomen: Soft, non-tender, non-distended,no masses, no hepatosplenomegaly, no hernia.  Skin: No rash, no lesions.  Extremities: No cyanosis, clubbing or edema.  PELVIC EXAM:  External Genitalia/Vulva: Anatomy is normal, no significant redness of labia, no discharge on vulvar tissues, [place]'s and Bartholin's glands are normal, no ulcers, no condylomatous lesions.  Urethral meatus: Normal, no lesions, no prolapse.  Urethra: Normal, no masses, no tenderness with palpation.  Bladder: Normal, no fullness, no masses, no tenderness with palpation.  Vagina: Vaginal tissues are not inflamed, normal color and texture, no significant discharge present.  Pelvic support adequate.  Cervix: Normal, no lesions, no purulent discharge, no cervical motion tenderness.  Uterus: Normal size, shape, and consistency.  Good mobility noted.  Minimal descent noted with good support.  Adnexa: Normal size and shape bilaterally, no palpable mass bilaterally and non-tender bilaterally.  Rectal:  SERENA deferred.    IMPRESSION  Suzi Heck is a 48 y.o.  presenting for consult for endometrial ablation due to irregular heavy menses, abdominal pain.    PLAN    1. Menorrhagia with irregular cycle  - GYN US WNL, EMB WNL  - Discussed medical management (provera, IUD, DMPA) or surgical management (endometrial ablation or hysterectomy) as well as risks and benefits of each; strongly declines any medical management due to history of mood effects with " contraception  - Discussed wide range of possible outcomes with endometrial ablation, goal of improved menses and not necessarily amenorrhea; we discussed approximately 50% of women with amenorrhea, 30% of women with improved menses, 20% of women with problematic persistent bleeding (irregular, heavier, unchanged) and risk of pelvic pain/postablative pain syndrome  - Discussed contraindication of pregnancy after endometrial ablation even including IVF and uses vasectomy of partner for permanent contraceptive method  - Discussed risks/benefits of surgery: risk of infection, bleeding, damage to surrounding structures, perforation, need for additional procedures; discussed risk of anesthesia including heart attack, stroke, and death; plan for outpatient surgery discussed.  - Case Request; Standing  - CBC and Differential; Future  - Basic Metabolic Panel; Future  - Type & Screen; Future  - sodium chloride 0.9 % flush 3 mL  - sodium chloride 0.9 % flush 10 mL  - sodium chloride 0.9 % infusion 40 mL  - lactated ringers infusion  - ceFAZolin (ANCEF) 2 g in sodium chloride 0.9 % 100 mL IVPB  - Case Request    2. Right lower quadrant abdominal pain  - Small cyst right ovary, appears physiologic  - May be related to Crohns , recent constipation  - Discussed risk of persistent pain after ablation, could require further medical or surgical management               This document has been electronically signed by Katie Leal DO on April 17, 2023 15:24 CDT

## 2023-05-03 ENCOUNTER — PRE-ADMISSION TESTING (OUTPATIENT)
Dept: PREADMISSION TESTING | Facility: HOSPITAL | Age: 48
End: 2023-05-03
Payer: COMMERCIAL

## 2023-05-03 VITALS
HEART RATE: 102 BPM | WEIGHT: 198 LBS | HEIGHT: 64 IN | OXYGEN SATURATION: 97 % | BODY MASS INDEX: 33.8 KG/M2 | RESPIRATION RATE: 18 BRPM | DIASTOLIC BLOOD PRESSURE: 70 MMHG | SYSTOLIC BLOOD PRESSURE: 110 MMHG

## 2023-05-03 DIAGNOSIS — N92.1 MENORRHAGIA WITH IRREGULAR CYCLE: ICD-10-CM

## 2023-05-03 LAB
ABO GROUP BLD: NORMAL
ANION GAP SERPL CALCULATED.3IONS-SCNC: 11 MMOL/L (ref 5–15)
BASOPHILS # BLD AUTO: 0.12 10*3/MM3 (ref 0–0.2)
BASOPHILS NFR BLD AUTO: 0.5 % (ref 0–1.5)
BLD GP AB SCN SERPL QL: NEGATIVE
BUN SERPL-MCNC: 8 MG/DL (ref 6–20)
BUN/CREAT SERPL: 10.5 (ref 7–25)
CALCIUM SPEC-SCNC: 8.9 MG/DL (ref 8.6–10.5)
CHLORIDE SERPL-SCNC: 107 MMOL/L (ref 98–107)
CO2 SERPL-SCNC: 24 MMOL/L (ref 22–29)
CREAT SERPL-MCNC: 0.76 MG/DL (ref 0.57–1)
DEPRECATED RDW RBC AUTO: 43.5 FL (ref 37–54)
EGFRCR SERPLBLD CKD-EPI 2021: 97.4 ML/MIN/1.73
EOSINOPHIL # BLD AUTO: 0.1 10*3/MM3 (ref 0–0.4)
EOSINOPHIL NFR BLD AUTO: 0.4 % (ref 0.3–6.2)
ERYTHROCYTE [DISTWIDTH] IN BLOOD BY AUTOMATED COUNT: 15.1 % (ref 12.3–15.4)
GLUCOSE SERPL-MCNC: 93 MG/DL (ref 65–99)
HCT VFR BLD AUTO: 38.2 % (ref 34–46.6)
HGB BLD-MCNC: 12.5 G/DL (ref 12–15.9)
IMM GRANULOCYTES # BLD AUTO: 0.12 10*3/MM3 (ref 0–0.05)
IMM GRANULOCYTES NFR BLD AUTO: 0.5 % (ref 0–0.5)
LYMPHOCYTES # BLD AUTO: 2.65 10*3/MM3 (ref 0.7–3.1)
LYMPHOCYTES NFR BLD AUTO: 10.8 % (ref 19.6–45.3)
Lab: NORMAL
MCH RBC QN AUTO: 26.2 PG (ref 26.6–33)
MCHC RBC AUTO-ENTMCNC: 32.7 G/DL (ref 31.5–35.7)
MCV RBC AUTO: 79.9 FL (ref 79–97)
MONOCYTES # BLD AUTO: 1.33 10*3/MM3 (ref 0.1–0.9)
MONOCYTES NFR BLD AUTO: 5.4 % (ref 5–12)
NEUTROPHILS NFR BLD AUTO: 20.19 10*3/MM3 (ref 1.7–7)
NEUTROPHILS NFR BLD AUTO: 82.4 % (ref 42.7–76)
NRBC BLD AUTO-RTO: 0 /100 WBC (ref 0–0.2)
PLATELET # BLD AUTO: 496 10*3/MM3 (ref 140–450)
PMV BLD AUTO: 9.6 FL (ref 6–12)
POTASSIUM SERPL-SCNC: 3.9 MMOL/L (ref 3.5–5.2)
RBC # BLD AUTO: 4.78 10*6/MM3 (ref 3.77–5.28)
RH BLD: POSITIVE
SODIUM SERPL-SCNC: 142 MMOL/L (ref 136–145)
T&S EXPIRATION DATE: NORMAL
WBC NRBC COR # BLD: 24.51 10*3/MM3 (ref 3.4–10.8)

## 2023-05-03 PROCEDURE — 86850 RBC ANTIBODY SCREEN: CPT

## 2023-05-03 PROCEDURE — 85025 COMPLETE CBC W/AUTO DIFF WBC: CPT

## 2023-05-03 PROCEDURE — 80048 BASIC METABOLIC PNL TOTAL CA: CPT

## 2023-05-03 PROCEDURE — 86901 BLOOD TYPING SEROLOGIC RH(D): CPT

## 2023-05-03 PROCEDURE — 86900 BLOOD TYPING SEROLOGIC ABO: CPT

## 2023-05-03 PROCEDURE — 36415 COLL VENOUS BLD VENIPUNCTURE: CPT

## 2023-05-03 RX ORDER — OMEPRAZOLE 20 MG/1
20 CAPSULE, DELAYED RELEASE ORAL DAILY
COMMUNITY

## 2023-05-07 ENCOUNTER — ANESTHESIA EVENT (OUTPATIENT)
Dept: PERIOP | Facility: HOSPITAL | Age: 48
End: 2023-05-07
Payer: COMMERCIAL

## 2023-05-07 NOTE — H&P
[facility]  HISTORY & PHYSICAL - Gynecology    Name: Suzi Heck  MRN: [id number]  Location: Room/bed info not found  Date: 2023  CSN: [id number]      CHIEF COMPLAINT: Hsc D&C, endometrial ablation scheduled     HISTORY OF PRESENT ILLNESS  Suzi Heck is a 48 y.o.  pre vs. perimenopausal female who presents with complaints of irregular heavy menses desiring endometrial ablation.       23 Ut 10.3 x 5.3 x 6.3 cm, heterogeneous echotexture, ES 1.17 cm, ROV 3.6 x 2.6 x 2.7 cm with 2.1 x 2.1 x 1.5 cm cyst; LOV 3.8 x 3.5 x 2.6 cm     Irregular heavy periods x 6-7 mos, continueous bleeding, worsens Crohn's. States only about 20 days no bleeding this year. Usually heavy times x 10 days or so, lighter, then may have few days off before starts again. Pain, cramping, diarrhea with bleeding. Recently dealing with some constipation.      23 NIL/hrHPV neg  23 EMB benign; Proliferative phase endometrium   Negative for hyperplasia or malignancy     23 A1c 5.7% (prediabetes, recommended FU with PCP), TSH 2.410, FSH 5.19, LH 2.06, Estradiol 28.1 (premenopausal vs. Perimenopause)     Having some hot flashes and night sweats, multiple times per week. Mood changes. States 20# weight gain 8 mos. Perimenopause symptoms around 40's.      Sharp RLQ pain. Colonoscopy 3-4 years ago. Rec FU with GI due to h/o Crohn's.      Denies any vaginal itching, burning, irritation, or discharge. Not reporting any sexual dysfunction concerns.  Not reporting any urinary symptoms including incontinence, dysuria, frequency, urgency, nocturia.    ROS  Review of Systems   Constitutional: Negative.    HENT: Negative.    Respiratory: Negative.    Cardiovascular: Negative.    Gastrointestinal: Negative.    Genitourinary: Negative.    Skin: Negative.    Psychiatric/Behavioral: Negative.        OBSTETRIC HISTORY  OB History    Para Term  AB Living   1 1 1     1   SAB IAB Ectopic Molar  Multiple Live Births             1      # Outcome Date GA Lbr Jovan/2nd Weight Sex Delivery Anes PTL Lv   1 Term 07    M CS-LTranv EPI N ARLETTE      Complications: Failure to Progress in First Stage, Preeclampsia     GYN HISTORY  Menses: see above; when younger sometimes would miss cycles, up to 6 mos without  History of STIs: denies  Last pap smear: UTD, see above  Abnormal pap smear history: denies  Contraception: vasectomy  OCPs-mood changes    PAST MEDICAL HISTORY  Past Medical History:   Diagnosis Date   • Abdominal pain     right lower quadrant      • Absolute anemia 2021   • Allergy     seasonal   • Conjunctivitis    • Crohn disease    • Encounter for routine gynecological examination    • Gastroesophageal reflux disease    • Irritable bowel syndrome with diarrhea    • Pigmented skin lesion    • PONV (postoperative nausea and vomiting)    • Screening examination for venereal disease    • Viral gastroenteritis      PAST SURGICAL HISTORY  Past Surgical History:   Procedure Laterality Date   •  SECTION  2007    (1) - Primary low transverse C section   • CHOLECYSTECTOMY     • COLONOSCOPY N/A 2019    Procedure: COLONOSCOPY;  Surgeon: Myles Ferraro MD;  Location: [facility] ENDOSCOPY;  Service: Gastroenterology   • ENDOSCOPY N/A 2019    Procedure: ESOPHAGOGASTRODUODENOSCOPY;  Surgeon: Myles Ferraro MD;  Location: [facility] ENDOSCOPY;  Service: Gastroenterology   • OTHER SURGICAL HISTORY      x 4   • OTHER SURGICAL HISTORY  2007    EXAM OF CERVIX W/SCOPE 78407 (1) - mild dysplasia(JOANIE I) Squamoglandular mucosa. Chronic cervicitis. Curettings, Endocervix: unremarkable columnar cell epithelium   • OTHER SURGICAL HISTORY  2016    Biopsy, Each Additional Lesion 03253 (1) - LONNIE Maldonado   • OTHER SURGICAL HISTORY  2010    Remove Impacted Cerumen 40346 (1) - DIONISIO Eaton   • PAP SMEAR  10/15/2007    (1) - Epithelial cell abnormality: squamous cells, Atypical squamouscells of  "undetermined significance. cannot exclude HSIL/(ASC-H)    • SKIN BIOPSY  06/16/2016    Biopsy of Skin 03814 (1) - LONNIE Maldonado   • THUMB CARPOMETACARPAL JOINT ARTHROPLASTY FLEXOR CARPI RADIALIS TENDON Right 04/13/2017    Procedure: THUMB CARPOMETACARPAL JOINT ARTHROPLASTY FLEXOR CARPI RADIALIS TENDON RELEASE  EXCISION OF GANGLLION AND RELEASE OF A1 PULLEY WRIST EXTENSOR DECREASED VEINS DISEASE;  Surgeon: Kolton Emery MD;  Location: [facility];  Service:      FAMILY HISTORY  Family History   Problem Relation Age of Onset   • Diabetes Father    • Prostate cancer Father    • Pancreatic cancer Father      SOCIAL HISTORY  Social History     Socioeconomic History   • Marital status:    Tobacco Use   • Smoking status: Every Day     Packs/day: 1.00     Years: 25.00     Pack years: 25.00     Types: Cigarettes     Start date: 1/17/1990   • Smokeless tobacco: Never   • Tobacco comments:     Current Smoker -    Vaping Use   • Vaping Use: Never used   Substance and Sexual Activity   • Alcohol use: No   • Drug use: No   • Sexual activity: Defer     ALLERGIES  Allergies   Allergen Reactions   • Adhesive Tape Other (See Comments)     blisters   • Codeine Other (See Comments)     \"dont know\"   • Demerol [Meperidine] Mental Status Change   • Vicodin [Hydrocodone-Acetaminophen] Other (See Comments)     \"non functional\"     HOME MEDICATIONS  Prior to Admission medications    Medication Sig Start Date End Date Taking? Authorizing Provider   cetirizine (zyrTEC) 10 MG tablet Take 1 tablet by mouth Daily.    John Villegas MD   Multiple Vitamins-Minerals (MULTIVIT/MULTIMINERAL ADULT PO) Take  by mouth Daily.    John Villegas MD   omeprazole (priLOSEC) 20 MG capsule Take 1 capsule by mouth Daily.    John Villegas MD   Probiotic Product (PROBIOTIC ADVANCED PO) Take  by mouth Daily.    John Villegas MD     PHYSICAL EXAM  Vitals and exam on presentation    LABS  No results found for: WBC, RBC, HGB, HCT, MCV, " MCH, MCHC, RDW, RDWSD, MPV, PLT, NEUTRORELPCT, LYMPHORELPCT, MONORELPCT, EOSRELPCT, BASORELPCT, AUTOIGPER, NEUTROABS, LYMPHSABS, MONOSABS, EOSABS, BASOSABS, AUTOIGNUM, NRBC   Chronic leukocytosis with elevation  Since  21.7> 24.77> 14.29 > 14.82 > 16.79 > 16.18 > 24.41; suspect related to Crohn's but will recommend FU with PCP and/or GI postop    Hgb 12.5  ]  Lab Results   Component Value Date    GLUCOSE 93 2023    BUN 8 2023    CREATININE 0.76 2023    EGFR 97.4 2023    BCR 10.5 2023    K 3.9 2023    CO2 24.0 2023    CALCIUM 8.9 2023    ALBUMIN 4.20 2021    BILITOT <0.2 2021    AST 10 2021    ALT 9 2021       IMAGING  See above    IMPRESSION  Suzi Heck is a 48 y.o.  presenting for consult for endometrial ablation due to irregular heavy menses, abdominal pain.     PLAN    1. Menorrhagia with irregular cycle  - GYN US WNL, EMB WNL  - Discussed medical management (provera, IUD, DMPA) or surgical management (endometrial ablation or hysterectomy) as well as risks and benefits of each; strongly declines any medical management due to history of mood effects with contraception  - Discussed wide range of possible outcomes with endometrial ablation, goal of improved menses and not necessarily amenorrhea; we discussed approximately 50% of women with amenorrhea, 30% of women with improved menses, 20% of women with problematic persistent bleeding (irregular, heavier, unchanged) and risk of pelvic pain/postablative pain syndrome  - Discussed contraindication of pregnancy after endometrial ablation even including IVF and uses vasectomy of partner for permanent contraceptive method  - Discussed risks/benefits of surgery: risk of infection, bleeding, damage to surrounding structures, perforation, need for additional procedures; discussed risk of anesthesia including heart attack, stroke, and death; plan for outpatient surgery  discussed.     2. Right lower quadrant abdominal pain  - Small cyst right ovary, appears physiologic  - May be related to Crohns , recent constipation  - Discussed risk of persistent pain after ablation, could require further medical or surgical management    - Medications:   - Suzi Heck and I have discussed pain goals for this hospitalization after reviewing her current clinical condition, medical history and prior pain experiences.  The goal is to keep her pain level manageable. Pain medications: tylenol, ibuprofen   - Antibiotics: none   - Nausea: zofran if needed   - Bowel regimen: colace if needed   - Anticoagulation: NI  - Labs: see above    > Leukocytosis: chronic, suspect related to Crohn's, recommend FU with PCP and/or GI  - DVT prophylaxis: SCDs  - Disposition: plan for outpatient procedure        This document has been electronically signed by Katie Leal DO on May 7, 2023 13:18 CDT

## 2023-05-08 ENCOUNTER — HOSPITAL ENCOUNTER (OUTPATIENT)
Facility: HOSPITAL | Age: 48
Setting detail: HOSPITAL OUTPATIENT SURGERY
Discharge: HOME | End: 2023-05-08
Attending: STUDENT IN AN ORGANIZED HEALTH CARE EDUCATION/TRAINING PROGRAM | Admitting: STUDENT IN AN ORGANIZED HEALTH CARE EDUCATION/TRAINING PROGRAM
Payer: COMMERCIAL

## 2023-05-08 ENCOUNTER — ANESTHESIA (OUTPATIENT)
Dept: PERIOP | Facility: HOSPITAL | Age: 48
End: 2023-05-08
Payer: COMMERCIAL

## 2023-05-08 VITALS
BODY MASS INDEX: 32.51 KG/M2 | WEIGHT: 195.11 LBS | HEART RATE: 77 BPM | DIASTOLIC BLOOD PRESSURE: 61 MMHG | TEMPERATURE: 97.3 F | HEIGHT: 65 IN | OXYGEN SATURATION: 97 % | SYSTOLIC BLOOD PRESSURE: 103 MMHG | RESPIRATION RATE: 18 BRPM

## 2023-05-08 DIAGNOSIS — N93.9 ABNORMAL UTERINE BLEEDING (AUB): ICD-10-CM

## 2023-05-08 DIAGNOSIS — N92.1 MENORRHAGIA WITH IRREGULAR CYCLE: ICD-10-CM

## 2023-05-08 LAB
ABO GROUP BLD: NORMAL
B-HCG UR QL: NEGATIVE
BLD GP AB SCN SERPL QL: NEGATIVE
Lab: NORMAL
RH BLD: POSITIVE
T&S EXPIRATION DATE: NORMAL

## 2023-05-08 PROCEDURE — 86901 BLOOD TYPING SEROLOGIC RH(D): CPT | Performed by: ANESTHESIOLOGY

## 2023-05-08 PROCEDURE — 58563 HYSTEROSCOPY ABLATION: CPT

## 2023-05-08 PROCEDURE — 25010000002 DEXAMETHASONE PER 1 MG: Performed by: NURSE ANESTHETIST, CERTIFIED REGISTERED

## 2023-05-08 PROCEDURE — 58563 HYSTEROSCOPY ABLATION: CPT | Performed by: STUDENT IN AN ORGANIZED HEALTH CARE EDUCATION/TRAINING PROGRAM

## 2023-05-08 PROCEDURE — 25010000002 FENTANYL CITRATE (PF) 100 MCG/2ML SOLUTION: Performed by: NURSE ANESTHETIST, CERTIFIED REGISTERED

## 2023-05-08 PROCEDURE — S0260 H&P FOR SURGERY: HCPCS | Performed by: STUDENT IN AN ORGANIZED HEALTH CARE EDUCATION/TRAINING PROGRAM

## 2023-05-08 PROCEDURE — 25010000002 MIDAZOLAM PER 1 MG: Performed by: NURSE ANESTHETIST, CERTIFIED REGISTERED

## 2023-05-08 PROCEDURE — 86900 BLOOD TYPING SEROLOGIC ABO: CPT | Performed by: ANESTHESIOLOGY

## 2023-05-08 PROCEDURE — 86850 RBC ANTIBODY SCREEN: CPT | Performed by: ANESTHESIOLOGY

## 2023-05-08 PROCEDURE — 25010000002 KETOROLAC TROMETHAMINE PER 15 MG: Performed by: NURSE ANESTHETIST, CERTIFIED REGISTERED

## 2023-05-08 PROCEDURE — 81025 URINE PREGNANCY TEST: CPT | Performed by: STUDENT IN AN ORGANIZED HEALTH CARE EDUCATION/TRAINING PROGRAM

## 2023-05-08 PROCEDURE — 25010000002 PROPOFOL 200 MG/20ML EMULSION: Performed by: NURSE ANESTHETIST, CERTIFIED REGISTERED

## 2023-05-08 PROCEDURE — 25010000002 CEFAZOLIN PER 500 MG: Performed by: STUDENT IN AN ORGANIZED HEALTH CARE EDUCATION/TRAINING PROGRAM

## 2023-05-08 PROCEDURE — 25010000002 ONDANSETRON PER 1 MG: Performed by: NURSE ANESTHETIST, CERTIFIED REGISTERED

## 2023-05-08 RX ORDER — FENTANYL CITRATE 50 UG/ML
INJECTION, SOLUTION INTRAMUSCULAR; INTRAVENOUS AS NEEDED
Status: DISCONTINUED | OUTPATIENT
Start: 2023-05-08 | End: 2023-05-08 | Stop reason: SURG

## 2023-05-08 RX ORDER — ACETAMINOPHEN 325 MG/1
650 TABLET ORAL EVERY 4 HOURS PRN
Qty: 50 TABLET | Refills: 1 | Status: SHIPPED | OUTPATIENT
Start: 2023-05-08 | End: 2024-05-07

## 2023-05-08 RX ORDER — SODIUM CHLORIDE 9 MG/ML
40 INJECTION, SOLUTION INTRAVENOUS AS NEEDED
Status: DISCONTINUED | OUTPATIENT
Start: 2023-05-08 | End: 2023-05-08 | Stop reason: HOSPADM

## 2023-05-08 RX ORDER — FENTANYL CITRATE 50 UG/ML
50 INJECTION, SOLUTION INTRAMUSCULAR; INTRAVENOUS
Status: DISCONTINUED | OUTPATIENT
Start: 2023-05-08 | End: 2023-05-08 | Stop reason: HOSPADM

## 2023-05-08 RX ORDER — ONDANSETRON 2 MG/ML
INJECTION INTRAMUSCULAR; INTRAVENOUS AS NEEDED
Status: DISCONTINUED | OUTPATIENT
Start: 2023-05-08 | End: 2023-05-08 | Stop reason: SURG

## 2023-05-08 RX ORDER — BUPIVACAINE HCL/0.9 % NACL/PF 0.1 %
2 PLASTIC BAG, INJECTION (ML) EPIDURAL ONCE
Status: COMPLETED | OUTPATIENT
Start: 2023-05-08 | End: 2023-05-08

## 2023-05-08 RX ORDER — IBUPROFEN 800 MG/1
800 TABLET ORAL EVERY 6 HOURS PRN
Qty: 40 TABLET | Refills: 1 | Status: SHIPPED | OUTPATIENT
Start: 2023-05-08

## 2023-05-08 RX ORDER — SODIUM CHLORIDE, SODIUM LACTATE, POTASSIUM CHLORIDE, CALCIUM CHLORIDE 600; 310; 30; 20 MG/100ML; MG/100ML; MG/100ML; MG/100ML
125 INJECTION, SOLUTION INTRAVENOUS CONTINUOUS
Status: DISCONTINUED | OUTPATIENT
Start: 2023-05-08 | End: 2023-05-08 | Stop reason: HOSPADM

## 2023-05-08 RX ORDER — MIDAZOLAM HYDROCHLORIDE 1 MG/ML
INJECTION INTRAMUSCULAR; INTRAVENOUS AS NEEDED
Status: DISCONTINUED | OUTPATIENT
Start: 2023-05-08 | End: 2023-05-08 | Stop reason: SURG

## 2023-05-08 RX ORDER — DEXAMETHASONE SODIUM PHOSPHATE 4 MG/ML
INJECTION, SOLUTION INTRA-ARTICULAR; INTRALESIONAL; INTRAMUSCULAR; INTRAVENOUS; SOFT TISSUE AS NEEDED
Status: DISCONTINUED | OUTPATIENT
Start: 2023-05-08 | End: 2023-05-08 | Stop reason: SURG

## 2023-05-08 RX ORDER — SODIUM CHLORIDE 0.9 % (FLUSH) 0.9 %
3 SYRINGE (ML) INJECTION EVERY 12 HOURS SCHEDULED
Status: DISCONTINUED | OUTPATIENT
Start: 2023-05-08 | End: 2023-05-08 | Stop reason: HOSPADM

## 2023-05-08 RX ORDER — ONDANSETRON 2 MG/ML
4 INJECTION INTRAMUSCULAR; INTRAVENOUS ONCE AS NEEDED
Status: DISCONTINUED | OUTPATIENT
Start: 2023-05-08 | End: 2023-05-08 | Stop reason: HOSPADM

## 2023-05-08 RX ORDER — PROPOFOL 10 MG/ML
INJECTION, EMULSION INTRAVENOUS AS NEEDED
Status: DISCONTINUED | OUTPATIENT
Start: 2023-05-08 | End: 2023-05-08 | Stop reason: SURG

## 2023-05-08 RX ORDER — KETOROLAC TROMETHAMINE 30 MG/ML
INJECTION, SOLUTION INTRAMUSCULAR; INTRAVENOUS AS NEEDED
Status: DISCONTINUED | OUTPATIENT
Start: 2023-05-08 | End: 2023-05-08 | Stop reason: SURG

## 2023-05-08 RX ORDER — SODIUM CHLORIDE 0.9 % (FLUSH) 0.9 %
10 SYRINGE (ML) INJECTION AS NEEDED
Status: DISCONTINUED | OUTPATIENT
Start: 2023-05-08 | End: 2023-05-08 | Stop reason: HOSPADM

## 2023-05-08 RX ORDER — SCOLOPAMINE TRANSDERMAL SYSTEM 1 MG/1
1 PATCH, EXTENDED RELEASE TRANSDERMAL ONCE
Status: DISCONTINUED | OUTPATIENT
Start: 2023-05-08 | End: 2023-05-08 | Stop reason: HOSPADM

## 2023-05-08 RX ORDER — LIDOCAINE HYDROCHLORIDE 20 MG/ML
INJECTION, SOLUTION EPIDURAL; INFILTRATION; INTRACAUDAL; PERINEURAL AS NEEDED
Status: DISCONTINUED | OUTPATIENT
Start: 2023-05-08 | End: 2023-05-08 | Stop reason: SURG

## 2023-05-08 RX ADMIN — SODIUM CHLORIDE, POTASSIUM CHLORIDE, SODIUM LACTATE AND CALCIUM CHLORIDE 125 ML/HR: 600; 310; 30; 20 INJECTION, SOLUTION INTRAVENOUS at 09:32

## 2023-05-08 RX ADMIN — KETOROLAC TROMETHAMINE 30 MG: 30 INJECTION, SOLUTION INTRAMUSCULAR; INTRAVENOUS at 10:56

## 2023-05-08 RX ADMIN — Medication 2 G: at 10:34

## 2023-05-08 RX ADMIN — FENTANYL CITRATE 25 MCG: 50 INJECTION, SOLUTION INTRAMUSCULAR; INTRAVENOUS at 10:42

## 2023-05-08 RX ADMIN — FENTANYL CITRATE 50 MCG: 50 INJECTION, SOLUTION INTRAMUSCULAR; INTRAVENOUS at 10:33

## 2023-05-08 RX ADMIN — MIDAZOLAM HYDROCHLORIDE 2 MG: 1 INJECTION, SOLUTION INTRAMUSCULAR; INTRAVENOUS at 10:29

## 2023-05-08 RX ADMIN — PROPOFOL 20 MG: 10 INJECTION, EMULSION INTRAVENOUS at 10:50

## 2023-05-08 RX ADMIN — ONDANSETRON 4 MG: 2 INJECTION INTRAMUSCULAR; INTRAVENOUS at 10:35

## 2023-05-08 RX ADMIN — PROPOFOL 20 MG: 10 INJECTION, EMULSION INTRAVENOUS at 10:47

## 2023-05-08 RX ADMIN — FENTANYL CITRATE 25 MCG: 50 INJECTION, SOLUTION INTRAMUSCULAR; INTRAVENOUS at 10:50

## 2023-05-08 RX ADMIN — LIDOCAINE HYDROCHLORIDE 50 MG: 20 INJECTION, SOLUTION EPIDURAL; INFILTRATION; INTRACAUDAL; PERINEURAL at 10:35

## 2023-05-08 RX ADMIN — SCOLOPAMINE TRANSDERMAL SYSTEM 1 PATCH: 1 PATCH, EXTENDED RELEASE TRANSDERMAL at 09:47

## 2023-05-08 RX ADMIN — FENTANYL CITRATE 25 MCG: 50 INJECTION, SOLUTION INTRAMUSCULAR; INTRAVENOUS at 10:56

## 2023-05-08 RX ADMIN — PROPOFOL 160 MG: 10 INJECTION, EMULSION INTRAVENOUS at 10:35

## 2023-05-08 RX ADMIN — FENTANYL CITRATE 25 MCG: 50 INJECTION, SOLUTION INTRAMUSCULAR; INTRAVENOUS at 11:00

## 2023-05-08 RX ADMIN — DEXAMETHASONE SODIUM PHOSPHATE 4 MG: 4 INJECTION, SOLUTION INTRAMUSCULAR; INTRAVENOUS at 10:41

## 2023-05-08 NOTE — OP NOTE
[facility]  Operative Report    Name: Suzi Heck  MRN: [id number]  Date: 5/8/2023  CSN: [id number]      Location: [facility]    Service: Gynecology    Pre-op Diagnosis: Abnormal uterine bleeding    Post-op Diagnosis: Same, status post endometrial ablation    Surgeon: Katie Leal DO    Assistant: Opal Velásquez, CST was responsible for performing the following activities: Retraction and Held/Positioned Camera and their skilled assistance was necessary for the success of this case.    Staff:  Circulator: Roxana Jenkins RN; Jazmyne Garcia RN  Scrub Person: Marisol Robison  Assistant: Opal Velásquez CSFA    Anesthesia: General    Anesthesia Staff:  CRNA: Shonna Umana CRNA  Student Nurse Anesthetist: Mary Lopez SRNA    Operation: Hysteroscopy, dilation curettage, endometrial ablation with NovaSure    Drains: None    Complications: None    Findings: Normal-appearing cervix, mildly thickened endometrium without any evidence of polyps or masses within the endometrial canal, visualized bilateral tubal ostia    Condition: Stable    Specimens/Disposition: Endometrial curettings    Estimated Blood Loss: Less than 10 mL  IV Fluids: 450 mL  Urine Output: Voided prior to or mL    Indications: Suzi Heck, 48 y.o., G 1 P 1001 with chief complaint of irregular heavy menses, desiring endometrial ablation for management.    Description of Operation:  The patient was identified and the procedure verified and was then taken to the OR where general LMA anesthesia was taking place.  The patient was placed in the dorsal lithotomy position with Yellofins stirrups.  She was prepped and draped in the normal, sterile fashion.   A timeout was then performed.  The bladder was drained in usual fashion.    A weighted and right angle retractor placed in vagina to visualize the cervix.  A single-tooth tenaculum was used to grasp the anterior lip of the cervix.    The cervix was then dilated  with Alvarado dilator to accommodate hysteroscope.  Hysteroscope was placed into the uterine cavity where the above findings were noted.  Gentle curettage was performed in a circumferential manner until a gritty sensation was noted throughout.  Endometrial sampling was sent out for pathology.     A uterine sound was easily introduced and the cervix from external to internal os was sounded to a length of 5 cm.  Total uterine cavity with cervix sounded to 5 cm.  Therefore the uterine cavity sounded to 5 cm.  The Novasure ablation device was inserted and deployed.  The uterine width was 4.6 cm.  The Novasure device was then calibrated and activated.  A successful ablation cycle was completed without complication lasting 1:10 seconds. The device was retracted and withdrawn. Total power 127 W.    The tenaculum was removed from the cervix and hemostasis was noted.  The vagina was inspected and found not to contain any instruments or sponges.  The instrument and sponge counts were correct. The patient tolerated the procedure well.  She was extubated and was escorted to the recovery room in stable condition.    The patient received Cefazolin 2g for antibiotic prophylaxis prior to the start of the procedure as erroneously ordered when orders removed with Epic upgrade.          This document has been electronically signed by Katie Leal DO on May 8, 2023 11:12 CDT

## 2023-05-08 NOTE — LETTER
May 8, 2023     Patient: Suzi Heck   YOB: 1975   Date of Visit: 4/17/2023       To Whom It May Concern:    Suzi Heck had a surgical procedure in our facility on 5/8/2023 and may not return to work until 5/10/2023. Please call our office if you have any questions or concerns.         Sincerely,    Dr. Leal/EUNICE Edwards

## 2023-05-08 NOTE — ANESTHESIA PREPROCEDURE EVALUATION
Anesthesia Evaluation     Patient summary reviewed and Nursing notes reviewed   history of anesthetic complications: PONV  NPO Solid Status: > 8 hours  NPO Liquid Status: > 2 hours           Airway   Mallampati: II  TM distance: >3 FB  Neck ROM: full  Dental    (+) poor dentition    Pulmonary     breath sounds clear to auscultation  (+) a smoker Current Smoked day of surgery,   (-) asthma, shortness of breath, sleep apnea, rhonchi, decreased breath sounds, wheezes, rales  Cardiovascular - negative cardio ROS  Exercise tolerance: good (4-7 METS)    Rhythm: regular  Rate: normal    (-) hypertension, past MI, dysrhythmias, angina, PARKINSON, murmur, cardiac stents, DVT      Neuro/Psych  (+) numbness,    (-) seizures, TIA, CVA  GI/Hepatic/Renal/Endo    (+) obesity,  GERD well controlled,    (-) liver disease, no renal disease, diabetes    Musculoskeletal     Abdominal   (+) obese,    Substance History   (-) alcohol use, drug use     OB/GYN          Other   arthritis, blood dyscrasia anemia,     ROS/Med Hx Other: Abnormal Uterine Bleeding    WBC: 24.51  Thrombocytosis: Platelets: 496    GERD: controlled on omeprazole                     Anesthesia Plan    ASA 3     general     (Hcg: negative  T&S pending   HGB: 12.5  Scopolamine patch ordered pre-op )  intravenous induction     Anesthetic plan, risks, benefits, and alternatives have been provided, discussed and informed consent has been obtained with: patient and spouse/significant other.    Use of blood products discussed with patient  Consented to blood products.

## 2023-05-08 NOTE — INTERVAL H&P NOTE
"48-year-old -0-0-1 with history of irregular heavy menstrual bleeding desiring an endometrial ablation.  Patient's status post benign endometrial biopsy.  Reports irregular heavy periods for 6 to 7 months now with continuous bleeding, flares her Crohn's disease.  No questions or concerns today, but ready to proceed with her endometrial ablation.'    Vitals:    23 0923   BP: 102/55   Pulse: 84   Resp: 18   Temp: 97.6 °F (36.4 °C)   SpO2: 98%   Weight: 88.5 kg (195 lb 1.7 oz)   Height: 165.1 cm (65\")     Gen: well appearing, NAD  CV: RRR  Chest: no increased work of breathing  Abd: nontender    No results found for: WBC, RBC, HGB, HCT, MCV, MCH, MCHC, RDW, RDWSD, MPV, PLT, NEUTRORELPCT, LYMPHORELPCT, MONORELPCT, EOSRELPCT, BASORELPCT, AUTOIGPER, NEUTROABS, LYMPHSABS, MONOSABS, EOSABS, BASOSABS, AUTOIGNUM, NRBC  Lab Results   Component Value Date    GLUCOSE 93 2023    BUN 8 2023    CREATININE 0.76 2023    EGFR 97.4 2023    BCR 10.5 2023    K 3.9 2023    CO2 24.0 2023    CALCIUM 8.9 2023    ALBUMIN 4.20 2021    BILITOT <0.2 2021    AST 10 2021    ALT 9 2021     48-year-old -0-0-1 with history of irregular heavy menstrual bleeding desiring an endometrial ablation.   To OR for scheduled Hsc D&C and endometrial ablation. No questions/concerns. Partner with her for support.  See H&P for full details.        This document has been electronically signed by Katie Leal DO on May 8, 2023 10:14 CDT    "

## 2023-05-08 NOTE — ANESTHESIA PROCEDURE NOTES
Airway  Urgency: elective    Date/Time: 5/8/2023 10:37 AM  End Time:5/8/2023 10:38 AM  Airway not difficult    General Information and Staff    Patient location during procedure: OR  CRNA/CAA: Shonna Umana CRNA  SRNA: Mary Lopez SRNA  Indications and Patient Condition  Indications for airway management: airway protection    Preoxygenated: yes  Mask difficulty assessment: 0 - not attempted    Final Airway Details  Final airway type: supraglottic airway      Successful airway: I-gel  Size 4     Number of attempts at approach: 1  Assessment: lips, teeth, and gum same as pre-op

## 2023-05-08 NOTE — ANESTHESIA POSTPROCEDURE EVALUATION
"Patient: Suzi Heck    Procedure Summary     Date: 05/08/23 Room / Location: [facility] OR [address] [place] [address] OR    Anesthesia Start: 1024 Anesthesia Stop: 1113    Procedure: HYSTEROSCOPY, DILATION AND CURETTAGE, ENDOMETRIAL ABLATION WITH NOVASURE (Vagina) Diagnosis:       Abnormal uterine bleeding (AUB)      (Abnormal uterine bleeding (AUB) [N93.9])    Surgeons: Katie Leal DO Provider: Shonna Umana CRNA    Anesthesia Type: general ASA Status: 3          Anesthesia Type: general    Vitals  No vitals data found for the desired time range.          Post Anesthesia Care and Evaluation    Patient location during evaluation: PACU  Patient participation: waiting for patient participation  Level of consciousness: sleepy but conscious  Pain management: adequate    Airway patency: patent  Anesthetic complications: No anesthetic complications    Cardiovascular status: acceptable  Respiratory status: acceptable and face mask  Hydration status: acceptable    Comments: /55   Pulse 84   Temp 97.6 °F (36.4 °C)   Resp 18   Ht 165.1 cm (65\")   Wt 88.5 kg (195 lb 1.7 oz)   LMP 05/01/2023   SpO2 98%   BMI 32.47 kg/m²         "

## 2023-05-10 LAB — REF LAB TEST METHOD: NORMAL

## 2023-05-24 ENCOUNTER — OFFICE VISIT (OUTPATIENT)
Dept: OBSTETRICS AND GYNECOLOGY | Facility: CLINIC | Age: 48
End: 2023-05-24
Payer: COMMERCIAL

## 2023-05-24 VITALS
SYSTOLIC BLOOD PRESSURE: 122 MMHG | DIASTOLIC BLOOD PRESSURE: 74 MMHG | BODY MASS INDEX: 33.32 KG/M2 | HEIGHT: 64 IN | WEIGHT: 195.2 LBS

## 2023-05-24 DIAGNOSIS — N85.00 ENDOMETRIAL HYPERPLASIA: Primary | ICD-10-CM

## 2023-05-24 DIAGNOSIS — R10.2 PELVIC PAIN: ICD-10-CM

## 2023-05-24 RX ORDER — SODIUM CHLORIDE 0.9 % (FLUSH) 0.9 %
10 SYRINGE (ML) INJECTION AS NEEDED
OUTPATIENT
Start: 2023-05-24

## 2023-05-24 RX ORDER — SODIUM CHLORIDE, SODIUM LACTATE, POTASSIUM CHLORIDE, CALCIUM CHLORIDE 600; 310; 30; 20 MG/100ML; MG/100ML; MG/100ML; MG/100ML
125 INJECTION, SOLUTION INTRAVENOUS CONTINUOUS
OUTPATIENT
Start: 2023-05-24

## 2023-05-24 RX ORDER — SODIUM CHLORIDE 9 MG/ML
40 INJECTION, SOLUTION INTRAVENOUS AS NEEDED
OUTPATIENT
Start: 2023-05-24

## 2023-05-24 RX ORDER — SODIUM CHLORIDE 0.9 % (FLUSH) 0.9 %
3 SYRINGE (ML) INJECTION EVERY 12 HOURS SCHEDULED
OUTPATIENT
Start: 2023-05-24

## 2023-05-24 NOTE — PROGRESS NOTES
"[facility]  Gynecology  Post-operative Visit    DOS: 23  Pre-op diagnosis: Abnormal uterine bleeding  Procedure: Hysteroscopy, dilation curettage, endometrial ablation with NovaSure  Pathology: DIAGNOSIS:   ENDOMETRIUM, CURETTINGS:   Secretory phase, post ovulatory day 6   See COMMENT   COMMENT:  Endometrial glands appear crowded, suggesting the possibility of   pre-existing endometrial hyperplasia (secretory hyperplasia).  No evidence of   atypia is identified.     Patient presents for post-op visit.  She states was feeling better Wednesday through Friday after Surgery (Monday surgery) with no pain. Then had period Saturday. Heavier spotting to now light spotting. Cramping during bleeding and now constant RLQ pain. No abnormal discharge. No N/V. Feels like return of prior pain.    /74   Ht 162.6 cm (64\")   Wt 88.5 kg (195 lb 3.2 oz)   LMP 2023   BMI 33.51 kg/m²   Gen: NAD, AAO x3  Abd: nontender, nondistended    A/P: Suzi Heck is a 48 y.o.  s/p Hysteroscopy, dilation curettage, endometrial ablation with NovaSure on 23.  Doing well.  - Reviewed pathology  - Discussed pathology suggesting of endometrial hyperplasia, although not completely clear. We discussed that increased risk of endometrial cancer with endometrial hyperplasia. Typical treatment with progesterone and repeat bx vs. Hysterectomy; cannot biopsy with confidence due to ablation and recommend considering hysterectomy. Patient wanted most conservative/careful option and desires to proceed with hysterectomy  - Will plan for robotic assisted total laparosocpic hysterectomy, bilateral salpingectomy, right salpingo-oophorectomy due to chronic RLQ pain, possible BSO (pending adhesions, appearance, endometriosis), cystoscopy  - Discussed risks/benefits of surgery: risk of infection, bleeding, damage to surrounding structures, need for additional procedures; discussed risk of anesthesia including heart " attack, stroke, and death; plan for outpatient surgery discussed.  - Need to wait at least 6 weeks, scheduling out which is okay with low risk hyperplasia without atypia or malignancy on D&C  - All questions answered          This document has been electronically signed by Katie Leal DO on May 24, 2023 13:12 CDT

## (undated) DEVICE — SUT VIC 2/0 UR6 27IN VCP602H

## (undated) DEVICE — GAUZE,SPONGE,FLUFF,6"X6.75",STRL,5/TRAY: Brand: MEDLINE

## (undated) DEVICE — SINGLE-USE BIOPSY FORCEPS: Brand: RADIAL JAW 4

## (undated) DEVICE — SUT VIC 4/0 RB1 27IN J214H

## (undated) DEVICE — SOL IRR NACL 0.9PCT BT 1000ML

## (undated) DEVICE — Device

## (undated) DEVICE — APPL CHLORAPREP W/TINT 26ML ORNG

## (undated) DEVICE — GOWN,PREVENTION PLUS,XLNG/XXLARGE,STRL: Brand: MEDLINE

## (undated) DEVICE — GLV SURG TRIUMPH LT PF LTX 9 STRL

## (undated) DEVICE — GLV SURG TRIUMPH LT PF LTX 7 STRL

## (undated) DEVICE — GLV SURG SENSICARE GREEN W/ALOE PF LF 7 STRL

## (undated) DEVICE — DISPOSABLE TOURNIQUET CUFF SINGLE BLADDER, DUAL PORT AND QUICK CONNECT CONNECTOR: Brand: COLOR CUFF

## (undated) DEVICE — DRSNG GZ CURAD XEROFORM NONADHR OVERWRAP 5X9IN

## (undated) DEVICE — SUT VIC 2/0 SH 27IN

## (undated) DEVICE — DRSNG PAD ABD COMBINE 9X10IN

## (undated) DEVICE — INTENDED FOR TISSUE SEPARATION, AND OTHER PROCEDURES THAT REQUIRE A SHARP SURGICAL BLADE TO PUNCTURE OR CUT.: Brand: BARD-PARKER ® CARBON RIB-BACK BLADES

## (undated) DEVICE — GLV SURG SENSICARE GREEN W/ALOE PF LF 9 STRL

## (undated) DEVICE — PRECISION THIN (5.5 X 0.38 X 18.0MM)

## (undated) DEVICE — CONVERTORS STOCKINETTE: Brand: CONVERTORS

## (undated) DEVICE — PAD UNDERCAST WYTEX 4IN 4YD LF STRL

## (undated) DEVICE — 3M™ STERI-STRIP™ REINFORCED ADHESIVE SKIN CLOSURES, R1547, 1/2 IN X 4 IN (12 MM X 100 MM), 6 STRIPS/ENVELOPE: Brand: 3M™ STERI-STRIP™

## (undated) DEVICE — PK EXTRM LF 60

## (undated) DEVICE — NDL HYPO ECLPS SFTY 25G 1 1/2IN

## (undated) DEVICE — ADHS LIQ MASTISOL 2/3ML

## (undated) DEVICE — 4.0MM ROUND CARBIDE BUR

## (undated) DEVICE — SYR LL TP 10ML STRL

## (undated) DEVICE — BITEBLOCK ENDO W/STRAP 60F A/ LF DISP

## (undated) DEVICE — DISPOSABLE BIPOLAR CODE, 12' (3.66 M): Brand: CONMED

## (undated) DEVICE — GLV SURG TRIUMPH ORTHO W/ALOE PF LTX 7.5 STRL

## (undated) DEVICE — BNDG ELAS ELITE V/CLOSE 4IN 5YD LF STRL